# Patient Record
Sex: FEMALE | Race: WHITE | Employment: FULL TIME | URBAN - METROPOLITAN AREA
[De-identification: names, ages, dates, MRNs, and addresses within clinical notes are randomized per-mention and may not be internally consistent; named-entity substitution may affect disease eponyms.]

---

## 2017-02-27 ENCOUNTER — TELEPHONE (OUTPATIENT)
Dept: ALLERGY | Facility: CLINIC | Age: 42
End: 2017-02-27

## 2017-02-27 NOTE — TELEPHONE ENCOUNTER
Call reviewed and noted. Agree with triage advice provided.   Patient to follow-up with urgent care or PCP if needed to be seen today  Otherwise and available later this week to see her

## 2017-02-27 NOTE — TELEPHONE ENCOUNTER
Dr. Armando Burk, pt stts that s/s began 1 1/2 week ago. S/s pressure in chest/ with wheeze, sinus/head congestion, low grade fever 100 as of this AM, headache above/below eyes, slight blood when blowing nose, facial pressure/pain.  Pt denies chest tightness, SOB,

## 2017-02-27 NOTE — TELEPHONE ENCOUNTER
Patient reports that she has been experiencing symptoms of sinus infection for the past week and a half. Yesterday, she started experiencing a low grade fever (100.3). When she blows nose, notices that it is slightly bloody.  Wants to know if she can get ab

## 2017-05-28 ENCOUNTER — HOSPITAL ENCOUNTER (OUTPATIENT)
Age: 42
Discharge: HOME | End: 2017-05-28
Payer: COMMERCIAL

## 2017-05-28 VITALS
BODY MASS INDEX: 26.66 KG/M2 | OXYGEN SATURATION: 99 % | TEMPERATURE: 98 F | HEIGHT: 69 IN | SYSTOLIC BLOOD PRESSURE: 107 MMHG | DIASTOLIC BLOOD PRESSURE: 72 MMHG | RESPIRATION RATE: 12 BRPM | WEIGHT: 180 LBS | HEART RATE: 64 BPM

## 2017-05-28 DIAGNOSIS — J01.01 ACUTE RECURRENT MAXILLARY SINUSITIS: Primary | ICD-10-CM

## 2017-05-28 PROCEDURE — 87430 STREP A AG IA: CPT

## 2017-05-28 PROCEDURE — 99213 OFFICE O/P EST LOW 20 MIN: CPT

## 2017-05-28 PROCEDURE — 99204 OFFICE O/P NEW MOD 45 MIN: CPT

## 2017-05-28 RX ORDER — AMOXICILLIN AND CLAVULANATE POTASSIUM 875; 125 MG/1; MG/1
1 TABLET, FILM COATED ORAL 2 TIMES DAILY
Qty: 14 TABLET | Refills: 0 | Status: SHIPPED | OUTPATIENT
Start: 2017-05-28 | End: 2017-06-04

## 2017-05-28 NOTE — ED PROVIDER NOTES
Patient Seen in: 5 [facility]    History   Patient presents with:  Cough/URI    Stated Complaint: sore throat    HPI    Patient is a 53-year-old female with a history of chronic sinusitis and previous sinus surgery who pres Cancer Paternal Grandmother          Smoking Status: Former Smoker                   Packs/Day: 0.00  Years:           Types: Cigarettes      Start date: 05/17/1996      Quit date: 05/17/2006    Comment: No Household Smokers     Alcohol Use: Yes equal, round, and reactive to light. Neck: Normal range of motion. Neck supple. Cardiovascular: Normal rate, regular rhythm, normal heart sounds and intact distal pulses.     Pulmonary/Chest: Effort normal and breath sounds normal.   Neurological: She i

## 2017-05-28 NOTE — ED INITIAL ASSESSMENT (HPI)
REPORTS COUGH X PAST WEEK. NOW WITH SINUS PRESSURE, NASAL CONGESTION AND BILATERAL \"EAR PRESSURE. \"  DENIES [address]. PATIENT ALSO C/O SORE THROAT.

## 2017-06-30 ENCOUNTER — HOSPITAL ENCOUNTER (OUTPATIENT)
Dept: GENERAL RADIOLOGY | Facility: HOSPITAL | Age: 42
Discharge: HOME | End: 2017-06-30
Payer: COMMERCIAL

## 2017-06-30 DIAGNOSIS — R93.3 ABNORMAL COLONOSCOPY: ICD-10-CM

## 2017-06-30 PROCEDURE — 74280 X-RAY XM COLON 2CNTRST STD: CPT

## 2017-07-10 ENCOUNTER — OFFICE VISIT (OUTPATIENT)
Dept: ALLERGY | Facility: CLINIC | Age: 42
End: 2017-07-10

## 2017-07-10 VITALS
RESPIRATION RATE: 14 BRPM | TEMPERATURE: 99 F | SYSTOLIC BLOOD PRESSURE: 108 MMHG | WEIGHT: 190 LBS | DIASTOLIC BLOOD PRESSURE: 70 MMHG | BODY MASS INDEX: 28.14 KG/M2 | HEIGHT: 69 IN | HEART RATE: 54 BPM

## 2017-07-10 DIAGNOSIS — J32.8 OTHER CHRONIC SINUSITIS: Primary | ICD-10-CM

## 2017-07-10 DIAGNOSIS — J30.89 ENVIRONMENTAL AND SEASONAL ALLERGIES: ICD-10-CM

## 2017-07-10 PROCEDURE — 99214 OFFICE O/P EST MOD 30 MIN: CPT | Performed by: ALLERGY & IMMUNOLOGY

## 2017-07-10 PROCEDURE — 99212 OFFICE O/P EST SF 10 MIN: CPT | Performed by: ALLERGY & IMMUNOLOGY

## 2017-07-10 RX ORDER — MONTELUKAST SODIUM 10 MG/1
10 TABLET ORAL NIGHTLY
Qty: 30 TABLET | Refills: 5 | Status: SHIPPED | OUTPATIENT
Start: 2017-07-10 | End: 2018-04-09

## 2017-07-10 RX ORDER — LEVOCETIRIZINE DIHYDROCHLORIDE 5 MG/1
TABLET, FILM COATED ORAL
Qty: 30 TABLET | Refills: 5 | Status: SHIPPED | OUTPATIENT
Start: 2017-07-10 | End: 2019-07-03

## 2017-07-10 NOTE — PROGRESS NOTES
Zoe Rojas is a 46year old female. HPI:   No chief complaint on file. Patient is a 22-year-old female who presents for follow-up with a chief complaint of allergies and sinus issues.     Patient last seen by me on December 28, 2016    Patient ha Years: 0.00         Types: Cigarettes     Start date: 5/17/1996     Quit date: 5/17/2006  Comment: No Household Smokers   Alcohol use: Yes           0.6 oz/week     Glasses of wine: 1 per week     Comment: Social        Medications (Active prior to today' murmurs, gallups, or rubs  Abdomen: soft non-tender non-distended  Skin/Hair: no unusual rashes present   Extremities: no edema, cyanosis, or clubbing     ASSESSMENT/PLAN:   Assessment   Other chronic sinusitis  (primary encounter diagnosis)  Environmental

## 2017-07-20 PROBLEM — R10.9 ABDOMINAL PAIN, UNSPECIFIED LOCATION: Status: ACTIVE | Noted: 2017-07-20

## 2017-07-20 PROBLEM — K56.699 COLON STRICTURE (HCC): Status: ACTIVE | Noted: 2017-07-20

## 2017-07-20 PROBLEM — J30.89 SEASONAL ALLERGIC RHINITIS DUE TO OTHER ALLERGIC TRIGGER, UNSPECIFIED CHRONICITY: Status: ACTIVE | Noted: 2017-07-20

## 2017-07-20 PROBLEM — Z85.72 HISTORY OF NON-HODGKIN'S LYMPHOMA: Status: ACTIVE | Noted: 2017-07-20

## 2017-07-20 PROBLEM — J32.9 CHRONIC SINUSITIS, UNSPECIFIED LOCATION: Status: ACTIVE | Noted: 2017-07-20

## 2017-07-20 PROCEDURE — 84439 ASSAY OF FREE THYROXINE: CPT | Performed by: INTERNAL MEDICINE

## 2017-07-20 PROCEDURE — 36415 COLL VENOUS BLD VENIPUNCTURE: CPT | Performed by: INTERNAL MEDICINE

## 2017-07-20 PROCEDURE — 80061 LIPID PANEL: CPT | Performed by: INTERNAL MEDICINE

## 2017-07-20 PROCEDURE — 80050 GENERAL HEALTH PANEL: CPT | Performed by: INTERNAL MEDICINE

## 2017-12-06 ENCOUNTER — LAB ENCOUNTER (OUTPATIENT)
Dept: LAB | Age: 42
End: 2017-12-06
Attending: OBSTETRICS & GYNECOLOGY
Payer: COMMERCIAL

## 2017-12-06 DIAGNOSIS — Z01.818 PREOP TESTING: ICD-10-CM

## 2017-12-06 PROCEDURE — 36415 COLL VENOUS BLD VENIPUNCTURE: CPT

## 2017-12-06 PROCEDURE — 86900 BLOOD TYPING SEROLOGIC ABO: CPT

## 2017-12-06 PROCEDURE — 86850 RBC ANTIBODY SCREEN: CPT

## 2017-12-06 PROCEDURE — 86901 BLOOD TYPING SEROLOGIC RH(D): CPT

## 2017-12-07 ENCOUNTER — ANESTHESIA (OUTPATIENT)
Dept: SURGERY | Facility: HOSPITAL | Age: 42
End: 2017-12-07
Payer: COMMERCIAL

## 2017-12-07 ENCOUNTER — SURGERY (OUTPATIENT)
Age: 42
End: 2017-12-07

## 2017-12-07 ENCOUNTER — HOSPITAL ENCOUNTER (OUTPATIENT)
Facility: HOSPITAL | Age: 42
Setting detail: HOSPITAL OUTPATIENT SURGERY
Discharge: HOME | End: 2017-12-07
Attending: OBSTETRICS & GYNECOLOGY | Admitting: OBSTETRICS & GYNECOLOGY
Payer: COMMERCIAL

## 2017-12-07 ENCOUNTER — ANESTHESIA EVENT (OUTPATIENT)
Dept: SURGERY | Facility: HOSPITAL | Age: 42
End: 2017-12-07
Payer: COMMERCIAL

## 2017-12-07 VITALS
SYSTOLIC BLOOD PRESSURE: 102 MMHG | BODY MASS INDEX: 27.55 KG/M2 | WEIGHT: 186 LBS | TEMPERATURE: 98 F | RESPIRATION RATE: 18 BRPM | DIASTOLIC BLOOD PRESSURE: 73 MMHG | HEIGHT: 69 IN | OXYGEN SATURATION: 100 % | HEART RATE: 66 BPM

## 2017-12-07 DIAGNOSIS — Z01.818 PREOP TESTING: Primary | ICD-10-CM

## 2017-12-07 PROBLEM — O02.1 MISSED ABORTION: Status: ACTIVE | Noted: 2017-12-07

## 2017-12-07 PROBLEM — O02.1 MISSED ABORTION: Status: RESOLVED | Noted: 2017-12-07 | Resolved: 2017-12-07

## 2017-12-07 PROBLEM — O02.1 MISSED ABORTION (HCC): Status: RESOLVED | Noted: 2017-12-07 | Resolved: 2017-12-07

## 2017-12-07 PROBLEM — O02.1 MISSED ABORTION (HCC): Status: ACTIVE | Noted: 2017-12-07

## 2017-12-07 PROCEDURE — 88305 TISSUE EXAM BY PATHOLOGIST: CPT | Performed by: OBSTETRICS & GYNECOLOGY

## 2017-12-07 PROCEDURE — 10D17ZZ EXTRACTION OF PRODUCTS OF CONCEPTION, RETAINED, VIA NATURAL OR ARTIFICIAL OPENING: ICD-10-PCS | Performed by: OBSTETRICS & GYNECOLOGY

## 2017-12-07 RX ORDER — FAMOTIDINE 20 MG/1
20 TABLET ORAL ONCE
Status: DISCONTINUED | OUTPATIENT
Start: 2017-12-07 | End: 2017-12-07 | Stop reason: HOSPADM

## 2017-12-07 RX ORDER — ONDANSETRON 4 MG/1
4 TABLET, FILM COATED ORAL EVERY 8 HOURS PRN
Status: DISCONTINUED | OUTPATIENT
Start: 2017-12-07 | End: 2017-12-07

## 2017-12-07 RX ORDER — ACETAMINOPHEN 325 MG/1
650 TABLET ORAL EVERY 4 HOURS PRN
Status: DISCONTINUED | OUTPATIENT
Start: 2017-12-07 | End: 2017-12-07

## 2017-12-07 RX ORDER — ONDANSETRON 2 MG/ML
4 INJECTION INTRAMUSCULAR; INTRAVENOUS EVERY 8 HOURS PRN
Status: DISCONTINUED | OUTPATIENT
Start: 2017-12-07 | End: 2017-12-07

## 2017-12-07 RX ORDER — HYDROMORPHONE HYDROCHLORIDE 1 MG/ML
0.4 INJECTION, SOLUTION INTRAMUSCULAR; INTRAVENOUS; SUBCUTANEOUS EVERY 5 MIN PRN
Status: DISCONTINUED | OUTPATIENT
Start: 2017-12-07 | End: 2017-12-07

## 2017-12-07 RX ORDER — SODIUM CHLORIDE, SODIUM LACTATE, POTASSIUM CHLORIDE, CALCIUM CHLORIDE 600; 310; 30; 20 MG/100ML; MG/100ML; MG/100ML; MG/100ML
INJECTION, SOLUTION INTRAVENOUS CONTINUOUS
Status: DISCONTINUED | OUTPATIENT
Start: 2017-12-07 | End: 2017-12-07

## 2017-12-07 RX ORDER — ONDANSETRON 2 MG/ML
4 INJECTION INTRAMUSCULAR; INTRAVENOUS ONCE AS NEEDED
Status: DISCONTINUED | OUTPATIENT
Start: 2017-12-07 | End: 2017-12-07

## 2017-12-07 RX ORDER — HALOPERIDOL 5 MG/ML
0.25 INJECTION INTRAMUSCULAR ONCE AS NEEDED
Status: DISCONTINUED | OUTPATIENT
Start: 2017-12-07 | End: 2017-12-07

## 2017-12-07 RX ORDER — MORPHINE SULFATE 2 MG/ML
2 INJECTION, SOLUTION INTRAMUSCULAR; INTRAVENOUS EVERY 10 MIN PRN
Status: DISCONTINUED | OUTPATIENT
Start: 2017-12-07 | End: 2017-12-07

## 2017-12-07 RX ORDER — KETOROLAC TROMETHAMINE 30 MG/ML
INJECTION, SOLUTION INTRAMUSCULAR; INTRAVENOUS AS NEEDED
Status: DISCONTINUED | OUTPATIENT
Start: 2017-12-07 | End: 2017-12-07 | Stop reason: SURG

## 2017-12-07 RX ORDER — MIDAZOLAM HYDROCHLORIDE 1 MG/ML
INJECTION INTRAMUSCULAR; INTRAVENOUS AS NEEDED
Status: DISCONTINUED | OUTPATIENT
Start: 2017-12-07 | End: 2017-12-07 | Stop reason: SURG

## 2017-12-07 RX ORDER — NALOXONE HYDROCHLORIDE 0.4 MG/ML
80 INJECTION, SOLUTION INTRAMUSCULAR; INTRAVENOUS; SUBCUTANEOUS AS NEEDED
Status: DISCONTINUED | OUTPATIENT
Start: 2017-12-07 | End: 2017-12-07

## 2017-12-07 RX ORDER — LIDOCAINE HYDROCHLORIDE 10 MG/ML
INJECTION, SOLUTION EPIDURAL; INFILTRATION; INTRACAUDAL; PERINEURAL AS NEEDED
Status: DISCONTINUED | OUTPATIENT
Start: 2017-12-07 | End: 2017-12-07 | Stop reason: SURG

## 2017-12-07 RX ORDER — DEXAMETHASONE SODIUM PHOSPHATE 4 MG/ML
VIAL (ML) INJECTION AS NEEDED
Status: DISCONTINUED | OUTPATIENT
Start: 2017-12-07 | End: 2017-12-07 | Stop reason: SURG

## 2017-12-07 RX ORDER — HYDROMORPHONE HYDROCHLORIDE 1 MG/ML
0.2 INJECTION, SOLUTION INTRAMUSCULAR; INTRAVENOUS; SUBCUTANEOUS EVERY 5 MIN PRN
Status: DISCONTINUED | OUTPATIENT
Start: 2017-12-07 | End: 2017-12-07

## 2017-12-07 RX ORDER — HYDROCODONE BITARTRATE AND ACETAMINOPHEN 5; 325 MG/1; MG/1
1 TABLET ORAL AS NEEDED
Status: DISCONTINUED | OUTPATIENT
Start: 2017-12-07 | End: 2017-12-07

## 2017-12-07 RX ORDER — MORPHINE SULFATE 4 MG/ML
4 INJECTION, SOLUTION INTRAMUSCULAR; INTRAVENOUS EVERY 10 MIN PRN
Status: DISCONTINUED | OUTPATIENT
Start: 2017-12-07 | End: 2017-12-07

## 2017-12-07 RX ORDER — HYDROMORPHONE HYDROCHLORIDE 1 MG/ML
0.6 INJECTION, SOLUTION INTRAMUSCULAR; INTRAVENOUS; SUBCUTANEOUS EVERY 5 MIN PRN
Status: DISCONTINUED | OUTPATIENT
Start: 2017-12-07 | End: 2017-12-07

## 2017-12-07 RX ORDER — HYDROCODONE BITARTRATE AND ACETAMINOPHEN 5; 325 MG/1; MG/1
2 TABLET ORAL EVERY 4 HOURS PRN
Status: DISCONTINUED | OUTPATIENT
Start: 2017-12-07 | End: 2017-12-07

## 2017-12-07 RX ORDER — DEXTROSE, SODIUM CHLORIDE, SODIUM LACTATE, POTASSIUM CHLORIDE, AND CALCIUM CHLORIDE 5; .6; .31; .03; .02 G/100ML; G/100ML; G/100ML; G/100ML; G/100ML
INJECTION, SOLUTION INTRAVENOUS CONTINUOUS
Status: DISCONTINUED | OUTPATIENT
Start: 2017-12-07 | End: 2017-12-07

## 2017-12-07 RX ORDER — HYDROCODONE BITARTRATE AND ACETAMINOPHEN 5; 325 MG/1; MG/1
2 TABLET ORAL AS NEEDED
Status: DISCONTINUED | OUTPATIENT
Start: 2017-12-07 | End: 2017-12-07

## 2017-12-07 RX ORDER — MORPHINE SULFATE 10 MG/ML
6 INJECTION, SOLUTION INTRAMUSCULAR; INTRAVENOUS EVERY 10 MIN PRN
Status: DISCONTINUED | OUTPATIENT
Start: 2017-12-07 | End: 2017-12-07

## 2017-12-07 RX ORDER — HYDROCODONE BITARTRATE AND ACETAMINOPHEN 5; 325 MG/1; MG/1
1 TABLET ORAL EVERY 4 HOURS PRN
Status: DISCONTINUED | OUTPATIENT
Start: 2017-12-07 | End: 2017-12-07

## 2017-12-07 RX ORDER — ACETAMINOPHEN 500 MG
1000 TABLET ORAL ONCE
Status: COMPLETED | OUTPATIENT
Start: 2017-12-07 | End: 2017-12-07

## 2017-12-07 RX ORDER — ONDANSETRON 2 MG/ML
INJECTION INTRAMUSCULAR; INTRAVENOUS AS NEEDED
Status: DISCONTINUED | OUTPATIENT
Start: 2017-12-07 | End: 2017-12-07 | Stop reason: SURG

## 2017-12-07 RX ADMIN — DEXAMETHASONE SODIUM PHOSPHATE 4 MG: 4 MG/ML VIAL (ML) INJECTION at 11:40:00

## 2017-12-07 RX ADMIN — ONDANSETRON 4 MG: 2 INJECTION INTRAMUSCULAR; INTRAVENOUS at 11:40:00

## 2017-12-07 RX ADMIN — SODIUM CHLORIDE, SODIUM LACTATE, POTASSIUM CHLORIDE, CALCIUM CHLORIDE: 600; 310; 30; 20 INJECTION, SOLUTION INTRAVENOUS at 12:15:00

## 2017-12-07 RX ADMIN — KETOROLAC TROMETHAMINE 60 MG: 30 INJECTION, SOLUTION INTRAMUSCULAR; INTRAVENOUS at 12:05:00

## 2017-12-07 RX ADMIN — LIDOCAINE HYDROCHLORIDE 25 MG: 10 INJECTION, SOLUTION EPIDURAL; INFILTRATION; INTRACAUDAL; PERINEURAL at 11:40:00

## 2017-12-07 RX ADMIN — SODIUM CHLORIDE, SODIUM LACTATE, POTASSIUM CHLORIDE, CALCIUM CHLORIDE: 600; 310; 30; 20 INJECTION, SOLUTION INTRAVENOUS at 11:39:00

## 2017-12-07 RX ADMIN — MIDAZOLAM HYDROCHLORIDE 2 MG: 1 INJECTION INTRAMUSCULAR; INTRAVENOUS at 11:40:00

## 2017-12-07 NOTE — ANESTHESIA POSTPROCEDURE EVALUATION
Patient: Ayr Tran    Procedure Summary     Date:  17 Room / Location:  [facility] MAIN OR  [facility] MAIN OR    Anesthesia Start:   Anesthesia Stop:      Procedure:  Andreas 79 (N/A ) Diagnosis:  (missed )    Surgeon:  Carlos La

## 2017-12-07 NOTE — OPERATIVE REPORT
[facility]    PATIENT'S NAME: Jeremy Lazcano   ATTENDING PHYSICIAN: Ronalee Severin. MD Thalia   OPERATING PHYSICIAN: Ronalee Severin.  MD Thalia   PATIENT ACCOUNT#:   [id number]    LOCATION:  [facility] 5 [facility] 10  MEDICAL RECORD #:   [id number]

## 2017-12-07 NOTE — H&P
[facility]    PATIENT'S NAME: Ame Garcia   ATTENDING PHYSICIAN: Lon Garcia.  MD Thalia   PATIENT ACCOUNT#:   [id number]    LOCATION:  [facility]  MEDICAL RECORD #:   [id number]       YOB: 1975  ADMISSION DATE:       12/07 react to light. Nares and throat clear. NECK:  Supple without thyromegaly or adenopathy. LUNGS:  Clear. HEART:  Regular rate and rhythm without murmurs. BREASTS:  No masses. ABDOMEN:  No hepatosplenomegaly, masses, or tenderness.     PELVIC:  External

## 2017-12-07 NOTE — INTERVAL H&P NOTE
Pre-op Diagnosis: missed     The above referenced H&P was reviewed by Jaqueline Magallanes MD on 2017, the patient was examined and no significant changes have occurred in the patient's condition since the H&P was performed.   I discussed wi

## 2017-12-07 NOTE — BRIEF OP NOTE
Pre-Operative Diagnosis: missed      Post-Operative Diagnosis: missed      Procedure Performed:   Procedure(s):  suction dilation and curettage    Surgeon(s) and Role:     * Juli Corrigan MD - Primary    Assistant(s):        Tobin Lance

## 2017-12-07 NOTE — ANESTHESIA PREPROCEDURE EVALUATION
Anesthesia PreOp Note    HPI:     Jeremy Nesbitt is a 38year old female who presents for preoperative consultation requested by: Brian Ly MD    Date of Surgery: 12/7/2017    Procedure(s):  DILATION & CURETTAGE SUCTION  Indication: missed a Unknown time   alprazolam Shobha Baldwin) 2 MG Oral Tab Take 2 mg by mouth 3 (three) times daily as needed.    Disp:  Rfl: 0 12/7/2017 at 1030       Current Facility-Administered Medications Ordered in Epic:  lactated ringers infusion  Intravenous Continuous Fitzma I  TM distance: >3 FB  Neck ROM: full  Dental - normal exam     Pulmonary - negative ROS and normal exam   Cardiovascular - negative ROS and normal exam    Neuro/Psych - negative ROS     GI/Hepatic/Renal - negative ROS     Endo/Other - negative ROS   Abdom

## 2018-03-24 RX ORDER — BECLOMETHASONE DIPROPIONATE 80 UG/1
AEROSOL, METERED NASAL
Qty: 8.7 G | Refills: 0 | Status: SHIPPED | OUTPATIENT
Start: 2018-03-24 | End: 2018-04-09

## 2018-03-24 NOTE — TELEPHONE ENCOUNTER
Refill requested for QNASL 80MCG NASAL AEROSOL 120SPRAYS  Will file in chart as: QNASL 80 MCG/ACT Nasal Aero Soln  USE 2 SPRAYS IN EACH NOSTRIL DAILY       Disp: 8.7 g Refills: 0    Class: Normal Start: 3/24/2018   Originally ordered: 1 year ago by Bart Cabrera

## 2018-03-24 NOTE — TELEPHONE ENCOUNTER
Qnasl refill ×1. Please call patient to schedule follow-up appointment as she is overdue. No further refills until seen in office.

## 2018-04-09 ENCOUNTER — OFFICE VISIT (OUTPATIENT)
Dept: ALLERGY | Facility: CLINIC | Age: 43
End: 2018-04-09

## 2018-04-09 VITALS
OXYGEN SATURATION: 98 % | HEART RATE: 58 BPM | DIASTOLIC BLOOD PRESSURE: 70 MMHG | RESPIRATION RATE: 16 BRPM | SYSTOLIC BLOOD PRESSURE: 110 MMHG | BODY MASS INDEX: 28.04 KG/M2 | TEMPERATURE: 98 F | WEIGHT: 185 LBS | HEIGHT: 68 IN

## 2018-04-09 DIAGNOSIS — J30.89 ENVIRONMENTAL AND SEASONAL ALLERGIES: ICD-10-CM

## 2018-04-09 DIAGNOSIS — J32.8 OTHER CHRONIC SINUSITIS: Primary | ICD-10-CM

## 2018-04-09 PROCEDURE — 99214 OFFICE O/P EST MOD 30 MIN: CPT | Performed by: ALLERGY & IMMUNOLOGY

## 2018-04-09 PROCEDURE — 99212 OFFICE O/P EST SF 10 MIN: CPT | Performed by: ALLERGY & IMMUNOLOGY

## 2018-04-09 NOTE — PATIENT INSTRUCTIONS
Recs continue with Qnasl and Xyzal:   Trial of Ponaris is a nasal emollient  Trial of nasal saline  Humidified air    Follow-up in 6-12 months or sooner if needed

## 2018-04-09 NOTE — PROGRESS NOTES
Arvin Lopes is a 38year old female. HPI:   Patient presents with: Allergies: Routine f/u for AR. She is currently taking her Xyzal and Qnasl. She d/c'd Singulair mid-November when she found out about pregnancy, has not resumed.    She had a D&C i node  No date: REPAIR OF NASAL SEPTUM   Family History   Problem Relation Age of Onset   • Hypertension Brother    • Diabetes Maternal Grandmother    • Heart Disorder Maternal Grandfather    • Cancer Paternal Grandmother       Social History: Smoking statu are moist .  Nasal irritation with dried blood on her inferior turbinate on the left-hand side  Neck/Thyroid: neck is supple without adenopathy  Lymphatic: no abnormal cervical, supraclavicular or axillary adenopathy is noted  Respiratory: normal to inspec

## 2018-04-15 ENCOUNTER — HOSPITAL ENCOUNTER (OUTPATIENT)
Age: 43
Discharge: HOME | End: 2018-04-15
Payer: COMMERCIAL

## 2018-04-15 VITALS
DIASTOLIC BLOOD PRESSURE: 76 MMHG | WEIGHT: 185 LBS | OXYGEN SATURATION: 100 % | HEIGHT: 68 IN | HEART RATE: 62 BPM | RESPIRATION RATE: 14 BRPM | TEMPERATURE: 99 F | SYSTOLIC BLOOD PRESSURE: 109 MMHG | BODY MASS INDEX: 28.04 KG/M2

## 2018-04-15 DIAGNOSIS — N30.01 ACUTE CYSTITIS WITH HEMATURIA: Primary | ICD-10-CM

## 2018-04-15 PROCEDURE — 87088 URINE BACTERIA CULTURE: CPT | Performed by: PHYSICIAN ASSISTANT

## 2018-04-15 PROCEDURE — 81025 URINE PREGNANCY TEST: CPT

## 2018-04-15 PROCEDURE — 99214 OFFICE O/P EST MOD 30 MIN: CPT

## 2018-04-15 PROCEDURE — 87086 URINE CULTURE/COLONY COUNT: CPT | Performed by: PHYSICIAN ASSISTANT

## 2018-04-15 PROCEDURE — 87186 SC STD MICRODIL/AGAR DIL: CPT | Performed by: PHYSICIAN ASSISTANT

## 2018-04-15 RX ORDER — CIPROFLOXACIN 500 MG/1
500 TABLET, FILM COATED ORAL 2 TIMES DAILY
Qty: 14 TABLET | Refills: 0 | Status: SHIPPED | OUTPATIENT
Start: 2018-04-15 | End: 2018-04-22

## 2018-04-15 RX ORDER — PHENAZOPYRIDINE HYDROCHLORIDE 200 MG/1
200 TABLET, FILM COATED ORAL 3 TIMES DAILY PRN
Qty: 9 TABLET | Refills: 0 | Status: SHIPPED | OUTPATIENT
Start: 2018-04-15 | End: 2018-04-22

## 2018-04-15 NOTE — ED PROVIDER NOTES
[facility]    History   Patient presents with:  Urinary Symptoms (urologic)    Stated Complaint: UTI    HPI    Patient is a 44-year-old female who presents for evaluation of possible UTI.   Admits to incre Gastrointestinal:        Lower abd pressure   Genitourinary: Positive for frequency and urgency. Positive for stated complaint: UTI  Other systems are as noted in HPI. Constitutional and vital signs reviewed.       All other systems reviewed and ne Given good ER precautions for any worsening symptoms. All questions answered prior to discharge. BP improved prior to dc.          Disposition and Plan     Clinical Impression:  Acute cystitis with hematuria  (primary encounter diagnosis)    Disposition:

## 2018-04-15 NOTE — ED INITIAL ASSESSMENT (HPI)
PATIENT ARRIVED AMBULATORY TO ROOM C/O SYMPTOMS OF A UTI THAT STARTED YESTERDAY. +LOWER BACK PAIN. +FREQUENCY. +URGENCY. NO PAIN WITH URINATION. PATIENT STATES SHE HAS BEEN TAKING AZO AT HOME. SLIGHT LOWER ABDOMINAL PAIN. NO FEVERS. +NAUSEA. NO V/D.

## 2018-04-24 ENCOUNTER — HOSPITAL ENCOUNTER (OUTPATIENT)
Dept: MAMMOGRAPHY | Facility: HOSPITAL | Age: 43
Discharge: HOME | End: 2018-04-24
Attending: OBSTETRICS & GYNECOLOGY
Payer: COMMERCIAL

## 2018-04-24 DIAGNOSIS — Z12.31 ENCOUNTER FOR SCREENING MAMMOGRAM FOR MALIGNANT NEOPLASM OF BREAST: ICD-10-CM

## 2018-04-24 PROCEDURE — 77067 SCR MAMMO BI INCL CAD: CPT | Performed by: OBSTETRICS & GYNECOLOGY

## 2018-04-25 ENCOUNTER — HOSPITAL ENCOUNTER (OUTPATIENT)
Dept: MAMMOGRAPHY | Facility: HOSPITAL | Age: 43
Discharge: HOME | End: 2018-04-25
Attending: OBSTETRICS & GYNECOLOGY
Payer: COMMERCIAL

## 2018-04-25 ENCOUNTER — HOSPITAL ENCOUNTER (OUTPATIENT)
Dept: ULTRASOUND IMAGING | Facility: HOSPITAL | Age: 43
Discharge: HOME | End: 2018-04-25
Attending: OBSTETRICS & GYNECOLOGY
Payer: COMMERCIAL

## 2018-04-25 DIAGNOSIS — R92.8 ABNORMAL MAMMOGRAM: ICD-10-CM

## 2018-04-25 PROCEDURE — 76642 ULTRASOUND BREAST LIMITED: CPT | Performed by: OBSTETRICS & GYNECOLOGY

## 2018-04-25 PROCEDURE — 77061 BREAST TOMOSYNTHESIS UNI: CPT | Performed by: OBSTETRICS & GYNECOLOGY

## 2018-04-25 PROCEDURE — 77065 DX MAMMO INCL CAD UNI: CPT | Performed by: OBSTETRICS & GYNECOLOGY

## 2018-07-31 ENCOUNTER — OFFICE VISIT (OUTPATIENT)
Dept: OBGYN CLINIC | Facility: CLINIC | Age: 43
End: 2018-07-31
Payer: COMMERCIAL

## 2018-07-31 VITALS
BODY MASS INDEX: 29.1 KG/M2 | HEIGHT: 68 IN | SYSTOLIC BLOOD PRESSURE: 103 MMHG | HEART RATE: 66 BPM | DIASTOLIC BLOOD PRESSURE: 70 MMHG | WEIGHT: 192 LBS

## 2018-07-31 DIAGNOSIS — N80.9 ENDOMETRIOSIS: ICD-10-CM

## 2018-07-31 DIAGNOSIS — Z01.419 ENCOUNTER FOR GYNECOLOGICAL EXAMINATION WITHOUT ABNORMAL FINDING: Primary | ICD-10-CM

## 2018-07-31 DIAGNOSIS — Z85.72 HISTORY OF NON-HODGKIN'S LYMPHOMA: ICD-10-CM

## 2018-07-31 DIAGNOSIS — Z12.4 SCREENING FOR MALIGNANT NEOPLASM OF CERVIX: ICD-10-CM

## 2018-07-31 PROCEDURE — 99386 PREV VISIT NEW AGE 40-64: CPT | Performed by: OBSTETRICS & GYNECOLOGY

## 2018-07-31 RX ORDER — HYDROCODONE BITARTRATE AND ACETAMINOPHEN 5; 325 MG/1; MG/1
TABLET ORAL
COMMUNITY
End: 2019-07-25

## 2018-07-31 NOTE — PROGRESS NOTES
HPI:    Patient ID: Leah Bartlett is a 38year old female. \" Colemolinaa Reasoner" referred by Kiera Yepez. HPI  G 5 P 0142 with reg menses q 28d / 4d / normal on ParaGard placed by Dr Mendez Colon in February for [facility]. She is post D and C for missed AB in December.  This w Dihydrochloride 5 MG Oral Tab TAKE 1 TABLET(5 MG) BY MOUTH EVERY NIGHT Disp: 30 tablet Rfl: 5   alprazolam (XANAX) 2 MG Oral Tab Take 2 mg by mouth 3 (three) times daily as needed.    Disp:  Rfl: 0   HYDROcodone-acetaminophen (NORCO) 5-325 MG Oral Tab Norco the lesion on colon is still present, the best course would be to consider a GnRH agonist followed by ovarian suppression.  She would like to avoid bowel surgery and I would agree but there is a risk of bleeding, obstruction and even perforation if endo pro

## 2018-08-02 LAB — HPV I/H RISK 1 DNA SPEC QL NAA+PROBE: NEGATIVE

## 2018-08-04 LAB — LAST PAP RESULT: NORMAL

## 2018-08-09 PROBLEM — N80.9 ENDOMETRIOSIS: Status: ACTIVE | Noted: 2018-08-09

## 2018-11-05 ENCOUNTER — HOSPITAL ENCOUNTER (OUTPATIENT)
Age: 43
Discharge: HOME | End: 2018-11-05
Payer: COMMERCIAL

## 2018-11-05 VITALS
OXYGEN SATURATION: 95 % | DIASTOLIC BLOOD PRESSURE: 70 MMHG | BODY MASS INDEX: 28.14 KG/M2 | HEIGHT: 69 IN | HEART RATE: 72 BPM | TEMPERATURE: 98 F | WEIGHT: 190 LBS | RESPIRATION RATE: 18 BRPM | SYSTOLIC BLOOD PRESSURE: 111 MMHG

## 2018-11-05 DIAGNOSIS — S16.1XXA STRAIN OF NECK MUSCLE, INITIAL ENCOUNTER: ICD-10-CM

## 2018-11-05 DIAGNOSIS — J01.10 ACUTE NON-RECURRENT FRONTAL SINUSITIS: Primary | ICD-10-CM

## 2018-11-05 PROCEDURE — 99214 OFFICE O/P EST MOD 30 MIN: CPT

## 2018-11-05 PROCEDURE — 99213 OFFICE O/P EST LOW 20 MIN: CPT

## 2018-11-05 PROCEDURE — 87430 STREP A AG IA: CPT

## 2018-11-05 RX ORDER — METHYLPREDNISOLONE 4 MG/1
TABLET ORAL
Qty: 1 PACKAGE | Refills: 0 | Status: SHIPPED | OUTPATIENT
Start: 2018-11-05 | End: 2018-11-10

## 2018-11-05 RX ORDER — AMOXICILLIN AND CLAVULANATE POTASSIUM 875; 125 MG/1; MG/1
1 TABLET, FILM COATED ORAL 2 TIMES DAILY
Qty: 20 TABLET | Refills: 0 | Status: SHIPPED | OUTPATIENT
Start: 2018-11-05 | End: 2018-11-15

## 2018-11-05 NOTE — ED PROVIDER NOTES
Patient presents with:  Sore Throat      HPI:     Glory Mcdaniels is a 38year old female who presents for evaluation and management of a chief complaint of dull maxillary sinus congestion, thick purulent drainage from the nose, postnasal drip, sore throat date: 2006        Years since quittin.4      Smokeless tobacco: Former User      Tobacco comment: No household smokers. Substance and Sexual Activity      Alcohol use:  Yes        Alcohol/week: 2.4 oz        Types: 4 Glasses of wine per week Rapid Strep      methylPREDNISolone 4 MG Oral Tablet Therapy Pack          Sig: Dosepack: use as directed on packaging          Dispense:  1 Package          Refill:  0      Amoxicillin-Pot Clavulanate 875-125 MG Oral Tab          Sig: Take 1 tablet by reed

## 2018-11-05 NOTE — ED INITIAL ASSESSMENT (HPI)
PATIENT ARRIVED AMBULATORY TO ROOM C/O A SORE THROAT THAT STARTED 5 DAYS AGO. +NASAL CONGESTION. +NON PRODUCTIVE COUGH. +CHILLS. PT DENIES TAKING TEMPERATURES AT HOME. NO N/V/D. EASY NON LABORED RESPIRATIONS.  NO DISTRESS

## 2018-11-20 ENCOUNTER — TELEPHONE (OUTPATIENT)
Dept: OBGYN CLINIC | Facility: CLINIC | Age: 43
End: 2018-11-20

## 2018-11-20 NOTE — TELEPHONE ENCOUNTER
C/O HAD PARAGARD IUD PLACED WITH DIFFERENT DR IN JAN 2018. REGULAR PERIODS UNTIL THIS MONTH.  GOT CRAMPING LIKE A PERIOD WOULD START BUT NO BLEED. PT STARTED KETO DIET 3 WEEKS AGO AND READ THAT IT COULD AFFECT PERIODS.   PT DOES NOT CHECK STRINGS BUT STAT

## 2018-11-21 NOTE — TELEPHONE ENCOUNTER
I would recommend just observing for now. I want her to call if irregularities encompass 3 of 6 subsequent cycles.

## 2019-01-16 ENCOUNTER — TELEPHONE (OUTPATIENT)
Dept: ALLERGY | Facility: CLINIC | Age: 44
End: 2019-01-16

## 2019-01-16 NOTE — TELEPHONE ENCOUNTER
Spoke with Dr. Dunia Yao, He recommends the following:   Sinus rinses  Sudafed 120 mg BID PRN  Afrin nasal spray, but only for a couple days.

## 2019-01-16 NOTE — TELEPHONE ENCOUNTER
Per pt, she is now taking Qnasal but pt is traveling to [place] and would like to know if she can increase the dose or anything she can take to help her about altitude, Pls advise.

## 2019-01-16 NOTE — TELEPHONE ENCOUNTER
Discussed with nursing staff. May add sinus rinses, Sudafed 120 mg every 12 hours as needed or lastly if needed a brief course of Afrin nasal decongestant spray for a 2-3-day.

## 2019-01-16 NOTE — TELEPHONE ENCOUNTER
Pt reports she is taking Xyzal nightly, and QNasl. She is agreeable to Dr. Susy Leroy recommendations below. No further action needed at this time.

## 2019-02-22 ENCOUNTER — HOSPITAL ENCOUNTER (OUTPATIENT)
Age: 44
Discharge: HOME | End: 2019-02-22
Payer: COMMERCIAL

## 2019-02-22 VITALS
SYSTOLIC BLOOD PRESSURE: 118 MMHG | WEIGHT: 185 LBS | OXYGEN SATURATION: 97 % | HEART RATE: 73 BPM | BODY MASS INDEX: 27.4 KG/M2 | RESPIRATION RATE: 18 BRPM | TEMPERATURE: 98 F | HEIGHT: 69 IN | DIASTOLIC BLOOD PRESSURE: 73 MMHG

## 2019-02-22 DIAGNOSIS — J02.0 STREPTOCOCCAL SORE THROAT: Primary | ICD-10-CM

## 2019-02-22 LAB — S PYO AG THROAT QL: POSITIVE

## 2019-02-22 PROCEDURE — 99213 OFFICE O/P EST LOW 20 MIN: CPT

## 2019-02-22 PROCEDURE — 99214 OFFICE O/P EST MOD 30 MIN: CPT

## 2019-02-22 PROCEDURE — 87430 STREP A AG IA: CPT

## 2019-02-22 RX ORDER — AMOXICILLIN 875 MG/1
875 TABLET, COATED ORAL 2 TIMES DAILY
Qty: 20 TABLET | Refills: 0 | Status: SHIPPED | OUTPATIENT
Start: 2019-02-22 | End: 2019-03-04

## 2019-02-22 NOTE — ED INITIAL ASSESSMENT (HPI)
PATIENT ARRIVED AMBULATORY TO ROOM. PATIENT'S SON IS A PATIENT IN THE IC AND POSITIVE FOR STREP SO PATIENT WANTED TO SIGN IN. +SLIGHT SORE THROAT X1 WEEK. +NASAL CONGESTION.  NO FEVERS

## 2019-02-23 NOTE — ED PROVIDER NOTES
Patient presents with:  Sore Throat      HPI:     Leo Cleaning is a 41year old female who presents for evaluation of a chief complaint of sore throat with some congestion for the past week and a half. No fevers. No difficulty swallowing.   Speech is c Not on file      Stress: Not on file    Relationships      Social connections:        Talks on phone: Not on file        Gets together: Not on file        Attends [employer] service: Not on file        Active member of club or organization: Not on file 10 days. Dispense:  20 tablet          Refill:  0      Labs performed this visit:  No results found for this or any previous visit (from the past 10 hour(s)). MDM:  The rapid strep test is positive.   I will be with amoxicillin and recommend she

## 2019-03-20 ENCOUNTER — MED REC SCAN ONLY (OUTPATIENT)
Dept: INTERNAL MEDICINE CLINIC | Facility: CLINIC | Age: 44
End: 2019-03-20

## 2019-05-14 RX ORDER — BECLOMETHASONE DIPROPIONATE 80 UG/1
AEROSOL, METERED NASAL
Qty: 8.7 G | Refills: 0 | Status: SHIPPED | OUTPATIENT
Start: 2019-05-14 | End: 2019-06-24

## 2019-05-14 NOTE — TELEPHONE ENCOUNTER
Pt scheduled for June 17 th to see Dr. Doc Kawasaki because we didn't have anything after 5 pm free.  Patient reports she is completely out of nasal spray currently, and since that appointment is over thirty days from now, she will run out of nasal spray again enrique

## 2019-05-14 NOTE — TELEPHONE ENCOUNTER
Pt last seen in Allergy 4/9/2018 for . . . Other chronic sinusitis  (primary encounter diagnosis)  Environmental and seasonal allergies     Refill request received for .  . .    Beclomethasone Dipropionate (QNASL) 80 MCG/ACT Nasal Aero Soln 8.7 g 10 4/9/

## 2019-06-24 RX ORDER — BECLOMETHASONE DIPROPIONATE 80 UG/1
AEROSOL, METERED NASAL
Qty: 10.6 G | Refills: 0 | Status: SHIPPED | OUTPATIENT
Start: 2019-06-24 | End: 2019-07-03

## 2019-06-24 NOTE — TELEPHONE ENCOUNTER
Refill requested for:   Name from pharmacy: [address]         Will file in chart as: QNASL 80 MCG/ACT Nasal Aero Soln    Sig: USE 2 SPRAYS IN EACH NOSTRIL DAILY    Disp:  10.6 g    Refills:  0    Start: 6/24/2019    Class: Normal

## 2019-07-03 ENCOUNTER — OFFICE VISIT (OUTPATIENT)
Dept: ALLERGY | Facility: CLINIC | Age: 44
End: 2019-07-03
Payer: COMMERCIAL

## 2019-07-03 VITALS
TEMPERATURE: 98 F | DIASTOLIC BLOOD PRESSURE: 71 MMHG | OXYGEN SATURATION: 96 % | SYSTOLIC BLOOD PRESSURE: 107 MMHG | RESPIRATION RATE: 16 BRPM | HEART RATE: 62 BPM

## 2019-07-03 DIAGNOSIS — J30.89 ENVIRONMENTAL AND SEASONAL ALLERGIES: Primary | ICD-10-CM

## 2019-07-03 DIAGNOSIS — J32.8 OTHER CHRONIC SINUSITIS: ICD-10-CM

## 2019-07-03 PROCEDURE — 99214 OFFICE O/P EST MOD 30 MIN: CPT | Performed by: ALLERGY & IMMUNOLOGY

## 2019-07-03 RX ORDER — LEVOCETIRIZINE DIHYDROCHLORIDE 5 MG/1
5 TABLET, FILM COATED ORAL NIGHTLY
Qty: 90 TABLET | Refills: 2 | Status: SHIPPED | OUTPATIENT
Start: 2019-07-03 | End: 2020-11-02

## 2019-07-03 NOTE — PROGRESS NOTES
Chelsy Bowman is a 41year old female. HPI:   Patient presents with: Follow - Up: Patient reports sinusistis is doing well. Patient is a 46-year-old female who presents for follow-up with a chief complaint of allergies.     Patient last seen by me 25s, lived til age 82      Social History: Social History    Tobacco Use      Smoking status: Former Smoker        Types: Cigarettes        Start date: 1996        Quit date: 2006        Years since quittin.1      Smokeless tobacco: Former U intact  Nose/Mouth/Throat: nose and throat are clear mucous membranes are moist   Neck/Thyroid: neck is supple without adenopathy  Lymphatic: no abnormal cervical, supraclavicular or axillary adenopathy is noted  Respiratory: normal to inspection lungs are

## 2019-07-03 NOTE — PATIENT INSTRUCTIONS
Stable and controlled at this time with current regimen including Qnasl and Xyzal.  Denies recurrent sinus infections or antibiotics.    Continue with with current Qnasl and Xyzal.  Reviewed avoidance measures and potential treatment option of immunotherapy

## 2019-07-19 ENCOUNTER — HOSPITAL ENCOUNTER (OUTPATIENT)
Dept: MAMMOGRAPHY | Age: 44
Discharge: HOME | End: 2019-07-19
Payer: COMMERCIAL

## 2019-07-19 DIAGNOSIS — Z12.31 VISIT FOR SCREENING MAMMOGRAM: ICD-10-CM

## 2019-07-19 PROCEDURE — 77067 SCR MAMMO BI INCL CAD: CPT | Performed by: INTERNAL MEDICINE

## 2019-07-19 PROCEDURE — 77063 BREAST TOMOSYNTHESIS BI: CPT | Performed by: INTERNAL MEDICINE

## 2019-07-25 ENCOUNTER — OFFICE VISIT (OUTPATIENT)
Dept: INTERNAL MEDICINE CLINIC | Facility: CLINIC | Age: 44
End: 2019-07-25
Payer: COMMERCIAL

## 2019-07-25 ENCOUNTER — LAB ENCOUNTER (OUTPATIENT)
Dept: LAB | Age: 44
End: 2019-07-25
Attending: INTERNAL MEDICINE
Payer: COMMERCIAL

## 2019-07-25 VITALS
TEMPERATURE: 98 F | BODY MASS INDEX: 29.2 KG/M2 | DIASTOLIC BLOOD PRESSURE: 88 MMHG | HEIGHT: 68 IN | OXYGEN SATURATION: 98 % | WEIGHT: 192.63 LBS | HEART RATE: 66 BPM | SYSTOLIC BLOOD PRESSURE: 112 MMHG

## 2019-07-25 DIAGNOSIS — F32.A DEPRESSION, UNSPECIFIED DEPRESSION TYPE: ICD-10-CM

## 2019-07-25 DIAGNOSIS — J32.9 CHRONIC SINUSITIS, UNSPECIFIED LOCATION: ICD-10-CM

## 2019-07-25 DIAGNOSIS — K21.9 GASTROESOPHAGEAL REFLUX DISEASE WITHOUT ESOPHAGITIS: ICD-10-CM

## 2019-07-25 DIAGNOSIS — Z00.00 PHYSICAL EXAM: ICD-10-CM

## 2019-07-25 DIAGNOSIS — Z85.72 HISTORY OF NON-HODGKIN'S LYMPHOMA: ICD-10-CM

## 2019-07-25 DIAGNOSIS — Z00.00 PHYSICAL EXAM: Primary | ICD-10-CM

## 2019-07-25 LAB
ALBUMIN SERPL-MCNC: 3.7 G/DL (ref 3.4–5)
ALBUMIN/GLOB SERPL: 1.1 {RATIO} (ref 1–2)
ALP LIVER SERPL-CCNC: 73 U/L (ref 37–98)
ALT SERPL-CCNC: 21 U/L (ref 13–56)
ANION GAP SERPL CALC-SCNC: 8 MMOL/L (ref 0–18)
AST SERPL-CCNC: 18 U/L (ref 15–37)
BASOPHILS # BLD AUTO: 0.04 X10(3) UL (ref 0–0.2)
BASOPHILS NFR BLD AUTO: 0.7 %
BILIRUB SERPL-MCNC: 0.4 MG/DL (ref 0.1–2)
BUN BLD-MCNC: 17 MG/DL (ref 7–18)
BUN/CREAT SERPL: 25.8 (ref 10–20)
CALCIUM BLD-MCNC: 8.8 MG/DL (ref 8.5–10.1)
CHLORIDE SERPL-SCNC: 106 MMOL/L (ref 98–112)
CHOLEST SMN-MCNC: 191 MG/DL (ref ?–200)
CO2 SERPL-SCNC: 27 MMOL/L (ref 21–32)
CREAT BLD-MCNC: 0.66 MG/DL (ref 0.55–1.02)
DEPRECATED RDW RBC AUTO: 45.3 FL (ref 35.1–46.3)
EOSINOPHIL # BLD AUTO: 0.06 X10(3) UL (ref 0–0.7)
EOSINOPHIL NFR BLD AUTO: 1.1 %
ERYTHROCYTE [DISTWIDTH] IN BLOOD BY AUTOMATED COUNT: 13.4 % (ref 11–15)
GLOBULIN PLAS-MCNC: 3.5 G/DL (ref 2.8–4.4)
GLUCOSE BLD-MCNC: 90 MG/DL (ref 70–99)
HCT VFR BLD AUTO: 41.4 % (ref 35–48)
HDLC SERPL-MCNC: 62 MG/DL (ref 40–59)
HGB BLD-MCNC: 13.3 G/DL (ref 12–16)
IMM GRANULOCYTES # BLD AUTO: 0.01 X10(3) UL (ref 0–1)
IMM GRANULOCYTES NFR BLD: 0.2 %
LDLC SERPL CALC-MCNC: 120 MG/DL (ref ?–100)
LYMPHOCYTES # BLD AUTO: 1.61 X10(3) UL (ref 1–4)
LYMPHOCYTES NFR BLD AUTO: 29 %
M PROTEIN MFR SERPL ELPH: 7.2 G/DL (ref 6.4–8.2)
MCH RBC QN AUTO: 29.6 PG (ref 26–34)
MCHC RBC AUTO-ENTMCNC: 32.1 G/DL (ref 31–37)
MCV RBC AUTO: 92.2 FL (ref 80–100)
MONOCYTES # BLD AUTO: 0.39 X10(3) UL (ref 0.1–1)
MONOCYTES NFR BLD AUTO: 7 %
NEUTROPHILS # BLD AUTO: 3.44 X10 (3) UL (ref 1.5–7.7)
NEUTROPHILS # BLD AUTO: 3.44 X10(3) UL (ref 1.5–7.7)
NEUTROPHILS NFR BLD AUTO: 62 %
NONHDLC SERPL-MCNC: 129 MG/DL (ref ?–130)
OSMOLALITY SERPL CALC.SUM OF ELEC: 293 MOSM/KG (ref 275–295)
PATIENT FASTING: YES
PATIENT FASTING: YES
PLATELET # BLD AUTO: 197 10(3)UL (ref 150–450)
POTASSIUM SERPL-SCNC: 4.3 MMOL/L (ref 3.5–5.1)
RBC # BLD AUTO: 4.49 X10(6)UL (ref 3.8–5.3)
SODIUM SERPL-SCNC: 141 MMOL/L (ref 136–145)
TRIGL SERPL-MCNC: 47 MG/DL (ref 30–149)
TSI SER-ACNC: 1.27 MIU/ML (ref 0.36–3.74)
VLDLC SERPL CALC-MCNC: 9 MG/DL (ref 0–30)
WBC # BLD AUTO: 5.6 X10(3) UL (ref 4–11)

## 2019-07-25 PROCEDURE — 80053 COMPREHEN METABOLIC PANEL: CPT

## 2019-07-25 PROCEDURE — 80061 LIPID PANEL: CPT

## 2019-07-25 PROCEDURE — 84443 ASSAY THYROID STIM HORMONE: CPT

## 2019-07-25 PROCEDURE — 85025 COMPLETE CBC W/AUTO DIFF WBC: CPT

## 2019-07-25 PROCEDURE — 99396 PREV VISIT EST AGE 40-64: CPT | Performed by: INTERNAL MEDICINE

## 2019-07-25 PROCEDURE — 36415 COLL VENOUS BLD VENIPUNCTURE: CPT

## 2019-07-25 RX ORDER — OMEPRAZOLE 40 MG/1
40 CAPSULE, DELAYED RELEASE ORAL DAILY
Qty: 30 CAPSULE | Refills: 3 | Status: SHIPPED | OUTPATIENT
Start: 2019-07-25 | End: 2019-12-17

## 2019-07-25 NOTE — PROGRESS NOTES
HPI:    Patient ID: Glory Mcdaniels is a 41year old female. Patient presents for continuing care. Physically she has been feeling well.   Patient had come off the Zoloft, but felt that she needed to restart it approximately 2 weeks ago secondary to fee Grandfather    • Cancer Paternal Grandmother    • Breast Cancer Paternal Grandmother         25s, lived til age 80   • Cancer Self 34        non hodgkins lymphoma      Social History    Socioeconomic History      Marital status:       Spouse name: N (50 mg total) by mouth daily. Disp: 30 tablet Rfl: 11   Omeprazole 40 MG Oral Capsule Delayed Release Take 1 capsule (40 mg total) by mouth daily.  Disp: 30 capsule Rfl: 3   Levocetirizine Dihydrochloride (XYZAL) 5 MG Oral Tab Take 1 tablet (5 mg total) by non-hodgkin's lymphoma  Depression, unspecified depression type  Gastroesophageal reflux disease without esophagitis    She has chronic sinusitis secondary to seasonal/environmental allergies.   Symptoms have been under good control with current medications

## 2019-09-11 ENCOUNTER — OFFICE VISIT (OUTPATIENT)
Dept: OBGYN CLINIC | Facility: CLINIC | Age: 44
End: 2019-09-11
Payer: COMMERCIAL

## 2019-09-11 VITALS
SYSTOLIC BLOOD PRESSURE: 109 MMHG | BODY MASS INDEX: 29 KG/M2 | DIASTOLIC BLOOD PRESSURE: 74 MMHG | HEART RATE: 62 BPM | WEIGHT: 193 LBS

## 2019-09-11 DIAGNOSIS — Z01.419 ENCOUNTER FOR GYNECOLOGICAL EXAMINATION WITHOUT ABNORMAL FINDING: Primary | ICD-10-CM

## 2019-09-11 PROCEDURE — 99396 PREV VISIT EST AGE 40-64: CPT | Performed by: OBSTETRICS & GYNECOLOGY

## 2019-09-20 ENCOUNTER — OFFICE VISIT (OUTPATIENT)
Dept: INTERNAL MEDICINE CLINIC | Facility: CLINIC | Age: 44
End: 2019-09-20
Payer: COMMERCIAL

## 2019-09-20 VITALS
HEART RATE: 72 BPM | SYSTOLIC BLOOD PRESSURE: 100 MMHG | WEIGHT: 197.5 LBS | OXYGEN SATURATION: 99 % | BODY MASS INDEX: 29.93 KG/M2 | DIASTOLIC BLOOD PRESSURE: 60 MMHG | TEMPERATURE: 98 F | HEIGHT: 68 IN

## 2019-09-20 DIAGNOSIS — H57.89 REDNESS OF EYE, LEFT: ICD-10-CM

## 2019-09-20 DIAGNOSIS — J02.9 PHARYNGITIS, UNSPECIFIED ETIOLOGY: Primary | ICD-10-CM

## 2019-09-20 PROCEDURE — 99213 OFFICE O/P EST LOW 20 MIN: CPT | Performed by: INTERNAL MEDICINE

## 2019-09-20 RX ORDER — POLYMYXIN B SULFATE AND TRIMETHOPRIM 1; 10000 MG/ML; [USP'U]/ML
1 SOLUTION OPHTHALMIC EVERY 6 HOURS
Qty: 1 BOTTLE | Refills: 0 | Status: SHIPPED | OUTPATIENT
Start: 2019-09-20 | End: 2020-08-04

## 2019-09-20 RX ORDER — PREDNISONE 20 MG/1
TABLET ORAL
Qty: 4 TABLET | Refills: 0 | Status: SHIPPED | OUTPATIENT
Start: 2019-09-20 | End: 2020-08-04

## 2019-09-20 NOTE — PROGRESS NOTES
Charlie Flroes is a 41year old female. HPI:   Patient presents with:  Sore Throat: Sore Throat, Loss of Voice, Ear Pressure; Saw [facility] ([address] [place] [address]) [address] who gave her a steroid but she left her prescription and never started.  Rapid Strep at that [place] tablet Rfl: 2   Beclomethasone Dipropionate (QNASL) 80 MCG/ACT Nasal Aero Soln 2 Squirts by Nasal route daily. Disp: 1 Inhaler Rfl: 10   alprazolam 2 MG Oral Tab Take 1 tablet (2 mg total) by mouth 3 (three) times daily as needed.  Disp: 30 tablet Rfl: 0 adenopathy, no tenderness to palpating the neck. Thyroid normal  LUNGS:  clear to auscultation. Effort normal  CARDIO:  RRR without murmur.    S1 and S2 normal  GI:  good BS's,  no masses,   HSM or tenderness  EXTREMITIES : no cyanosis, clubbing or edema

## 2019-09-23 NOTE — PROGRESS NOTES
HPI:    Patient ID: Tej Chavez is a 41year old female. \" Salma Campos \". HPI G6 [id number]  With regular menses and ParaGard for [facility]. Normal jaya in July.      Review of Systems   Constitutional: Negative for appetite change, fatigue and unexpected weight ch thyromegaly present. Cardiovascular: Normal rate, regular rhythm and normal heart sounds. No murmur heard. Pulmonary/Chest: Effort normal and breath sounds normal. She has no wheezes. She has no rales. No breast discharge. Abdominal: Soft.  She

## 2019-11-29 ENCOUNTER — TELEPHONE (OUTPATIENT)
Dept: OBGYN CLINIC | Facility: CLINIC | Age: 44
End: 2019-11-29

## 2019-11-29 ENCOUNTER — HOSPITAL ENCOUNTER (OUTPATIENT)
Age: 44
Discharge: HOME | End: 2019-11-29
Payer: COMMERCIAL

## 2019-11-29 VITALS
BODY MASS INDEX: 27.4 KG/M2 | WEIGHT: 185 LBS | RESPIRATION RATE: 20 BRPM | HEIGHT: 69 IN | SYSTOLIC BLOOD PRESSURE: 111 MMHG | DIASTOLIC BLOOD PRESSURE: 62 MMHG | HEART RATE: 71 BPM | OXYGEN SATURATION: 98 % | TEMPERATURE: 98 F

## 2019-11-29 DIAGNOSIS — N89.8 VAGINAL DISCHARGE: Primary | ICD-10-CM

## 2019-11-29 PROCEDURE — 99214 OFFICE O/P EST MOD 30 MIN: CPT

## 2019-11-29 PROCEDURE — 87106 FUNGI IDENTIFICATION YEAST: CPT | Performed by: NURSE PRACTITIONER

## 2019-11-29 PROCEDURE — 81025 URINE PREGNANCY TEST: CPT

## 2019-11-29 PROCEDURE — 87491 CHLMYD TRACH DNA AMP PROBE: CPT | Performed by: NURSE PRACTITIONER

## 2019-11-29 PROCEDURE — 87591 N.GONORRHOEAE DNA AMP PROB: CPT | Performed by: NURSE PRACTITIONER

## 2019-11-29 PROCEDURE — 87205 SMEAR GRAM STAIN: CPT | Performed by: NURSE PRACTITIONER

## 2019-11-29 PROCEDURE — 87808 TRICHOMONAS ASSAY W/OPTIC: CPT | Performed by: NURSE PRACTITIONER

## 2019-11-29 PROCEDURE — 81002 URINALYSIS NONAUTO W/O SCOPE: CPT

## 2019-11-29 RX ORDER — METRONIDAZOLE 500 MG/1
500 TABLET ORAL 2 TIMES DAILY
Qty: 14 TABLET | Refills: 0 | Status: SHIPPED | OUTPATIENT
Start: 2019-11-29 | End: 2019-12-06

## 2019-11-29 NOTE — ED PROVIDER NOTES
Patient presents with:  Eval-G (genital)      HPI:     Arvin Lopes is a 45year old female with a past history of GERD, endometriosis, non-Hodgkin's lymphoma presents with a chief complaint of vaginal discharge and odor over the course the last 2 days. vaginosis. I gave her the option to wait for the culture or we can start her on Flagyl. I discussed that this medication can be difficult to take and is harsh on your stomach. I encouraged her to take with food.   I will notify her in 2 days of the resul

## 2019-11-29 NOTE — TELEPHONE ENCOUNTER
C/O VAGINAL ODOR \"THAT IS REALLY BAD\". ADVISED NARENDRA IS NOT IN THE OFFICE TODAY. PT DOES NOT WANT TO WAIT UNTIL Monday TO BE SEEN SO WILL GO TO URGENT CARE.

## 2019-12-02 ENCOUNTER — TELEPHONE (OUTPATIENT)
Dept: OBGYN CLINIC | Facility: CLINIC | Age: 44
End: 2019-12-02

## 2019-12-02 NOTE — TELEPHONE ENCOUNTER
Pt wanting NARENDRA's recs. Pt had gone to [facility] d/t vaginal odor and discharge. She was put on Flagyl bid x 7 days. Pt states that the [facility] called her back and informed pt that she was negative for BV. Results are in the computer.   Pt sta

## 2019-12-02 NOTE — TELEPHONE ENCOUNTER
Pt was seen at [facility] and given medication , pt was called and said test are negative , should she discontinue medication

## 2019-12-17 RX ORDER — OMEPRAZOLE 40 MG/1
40 CAPSULE, DELAYED RELEASE ORAL DAILY
Qty: 90 CAPSULE | Refills: 1 | Status: SHIPPED | OUTPATIENT
Start: 2019-12-17 | End: 2020-08-04

## 2019-12-27 NOTE — TELEPHONE ENCOUNTER
Pt reports brown and sometimes green vaginal discharge with odor for about 5 days. Advised pt she should be seen and offered the first available appt with NARENDRA tomorrow morning. Pt states she is out of town and expects to be back on 12/30.  Informed pt NARENDRA ibanez

## 2019-12-27 NOTE — TELEPHONE ENCOUNTER
Pt. Concerned that she continues to have symptoms, and she did go to [facility]  A few weeks ago. Please call to discuss.

## 2020-01-02 ENCOUNTER — OFFICE VISIT (OUTPATIENT)
Dept: OBGYN CLINIC | Facility: CLINIC | Age: 45
End: 2020-01-02
Payer: COMMERCIAL

## 2020-01-02 VITALS
BODY MASS INDEX: 31 KG/M2 | WEIGHT: 208.81 LBS | HEART RATE: 73 BPM | DIASTOLIC BLOOD PRESSURE: 71 MMHG | SYSTOLIC BLOOD PRESSURE: 104 MMHG

## 2020-01-02 DIAGNOSIS — N89.8 VAGINAL DISCHARGE: Primary | ICD-10-CM

## 2020-01-02 DIAGNOSIS — N92.6 IRREGULAR PERIODS: ICD-10-CM

## 2020-01-02 PROCEDURE — 99213 OFFICE O/P EST LOW 20 MIN: CPT | Performed by: CLINICAL NURSE SPECIALIST

## 2020-01-02 RX ORDER — LEVOCETIRIZINE DIHYDROCHLORIDE 5 MG/1
TABLET, FILM COATED ORAL
COMMUNITY
Start: 2019-07-03 | End: 2021-12-20

## 2020-01-03 NOTE — PROGRESS NOTES
Milton Vale is a 45year old female [id number] Patient's last menstrual period was 12/09/2019. Patient presents with:  Gyn Problem: irregular periods and discharge   Here with c/o irregular periods for the last 4 months and vaginal discharge with odor.  We smokers. Substance and Sexual Activity      Alcohol use:  Yes        Alcohol/week: 4.0 standard drinks        Types: 4 Glasses of wine per week        Comment: Social       Drug use: No      Sexual activity: Yes        Partners: Male        Birth [address] [facility] tablet, Rfl: 11  •  Levocetirizine Dihydrochloride (XYZAL) 5 MG Oral Tab, Take 1 tablet (5 mg total) by mouth nightly., Disp: 90 tablet, Rfl: 2  •  Beclomethasone Dipropionate (QNASL) 80 MCG/ACT Nasal Aero Soln, 2 Squirts by Nasal route daily. , Disp: 1 Inh

## 2020-01-05 LAB
GENITAL VAGINOSIS SCREEN: NEGATIVE
TRICHOMONAS SCREEN: NEGATIVE

## 2020-01-26 ENCOUNTER — HOSPITAL ENCOUNTER (OUTPATIENT)
Age: 45
Discharge: HOME | End: 2020-01-26
Payer: COMMERCIAL

## 2020-01-26 VITALS
SYSTOLIC BLOOD PRESSURE: 107 MMHG | OXYGEN SATURATION: 99 % | HEIGHT: 69 IN | BODY MASS INDEX: 28.14 KG/M2 | RESPIRATION RATE: 20 BRPM | DIASTOLIC BLOOD PRESSURE: 67 MMHG | TEMPERATURE: 98 F | HEART RATE: 64 BPM | WEIGHT: 190 LBS

## 2020-01-26 DIAGNOSIS — J02.0 STREPTOCOCCAL SORE THROAT: Primary | ICD-10-CM

## 2020-01-26 LAB — S PYO AG THROAT QL: POSITIVE

## 2020-01-26 PROCEDURE — 99214 OFFICE O/P EST MOD 30 MIN: CPT

## 2020-01-26 PROCEDURE — 87430 STREP A AG IA: CPT

## 2020-01-26 PROCEDURE — 99213 OFFICE O/P EST LOW 20 MIN: CPT

## 2020-01-26 RX ORDER — AMOXICILLIN 875 MG/1
875 TABLET, COATED ORAL 2 TIMES DAILY
Qty: 20 TABLET | Refills: 0 | Status: SHIPPED | OUTPATIENT
Start: 2020-01-26 | End: 2020-02-05

## 2020-01-26 NOTE — ED INITIAL ASSESSMENT (HPI)
Sinus congestion for 4 days. No fever. Frontal headache and facial pressure with  Bilateral ear pressure. Mild dizziness.

## 2020-01-26 NOTE — ED PROVIDER NOTES
Patient presents with:  Cough/URI      HPI:     Milton Vale is a 45year old female who presents for evaluation of a chief complaint of sore throat, nasal congestion, dry cough, body aches, and chills for the past 3 to 4 days.   Positive sick contacts a activity: Yes        Partners: Male        Birth control/protection: I.U.D.     Lifestyle      Physical activity:        Days per week: Not on file        Minutes per session: Not on file      Stress: Not on file    Relationships      Social connections: Orders for this encounter:    Orders Placed This Encounter      POCT Rapid Strep Once      POCT Rapid Strep      amoxicillin 875 MG Oral Tab          Sig: Take 1 tablet (875 mg total) by mouth 2 (two) times daily for 10 days.           Dispense:  20 table

## 2020-03-18 ENCOUNTER — TELEPHONE (OUTPATIENT)
Dept: INTERNAL MEDICINE CLINIC | Facility: CLINIC | Age: 45
End: 2020-03-18

## 2020-03-18 RX ORDER — ALPRAZOLAM 2 MG/1
2 TABLET ORAL 3 TIMES DAILY PRN
Qty: 30 TABLET | Refills: 0 | Status: SHIPPED | OUTPATIENT
Start: 2020-03-18 | End: 2021-03-16

## 2020-04-02 ENCOUNTER — VIRTUAL PHONE E/M (OUTPATIENT)
Dept: ALLERGY | Facility: CLINIC | Age: 45
End: 2020-04-02
Payer: COMMERCIAL

## 2020-04-02 DIAGNOSIS — J30.89 ENVIRONMENTAL AND SEASONAL ALLERGIES: Primary | ICD-10-CM

## 2020-04-02 DIAGNOSIS — J32.8 OTHER CHRONIC SINUSITIS: ICD-10-CM

## 2020-04-02 PROCEDURE — 99213 OFFICE O/P EST LOW 20 MIN: CPT | Performed by: ALLERGY & IMMUNOLOGY

## 2020-04-02 RX ORDER — BECLOMETHASONE DIPROPIONATE 80 UG/1
AEROSOL, METERED NASAL
Qty: 10.6 G | Refills: 0 | OUTPATIENT
Start: 2020-04-02

## 2020-04-02 NOTE — TELEPHONE ENCOUNTER
Refill request denied. Patient last seen in April 2018 over 2 years ago. Patient will need follow-up appointment for refills.   Please call to schedule

## 2020-04-02 NOTE — PROGRESS NOTES
Virtual/Telephone Check-In    Bella Morales verbally consents to a Virtual/Telephone Check-In service on 04/02/20. Patient understands and accepts financial responsibility for any deductible, co-insurance and/or co-pays associated with this service.

## 2020-04-02 NOTE — TELEPHONE ENCOUNTER
Refill requested for   Name from pharmacy: [address]          Will file in chart as: QNASL 80 MCG/ACT Nasal Aero Soln         Sig: USE 2 SPRAYS IN EACH NOSTRIL DAILY    Disp:  10.6 g    Refills:  0 (Pharmacy requested: Not specifi

## 2020-04-02 NOTE — TELEPHONE ENCOUNTER
RN called patient to notify her of RX refill denial and need for follow up for any further refills. Patient has agreed to [facility] on Monday 4/6 with Dr. Dunia Yao at [phone].     Patient is wondering if it is at all possible to refill QNasl prior to

## 2020-04-02 NOTE — TELEPHONE ENCOUNTER
Call reviewed and noted. We can always do a telephone follow-up visits today rather than Monday if she is available today.

## 2020-05-27 RX ORDER — BECLOMETHASONE DIPROPIONATE 80 UG/1
AEROSOL, METERED NASAL
Qty: 31.8 G | Refills: 0 | Status: SHIPPED | OUTPATIENT
Start: 2020-05-27 | End: 2020-09-22

## 2020-05-27 NOTE — TELEPHONE ENCOUNTER
Refill requested for    Name from pharmacy: [address] [facility] [employer]         Will file in chart as: QNASL 80 MCG/ACT Nasal Aero Soln         Sig: USE 2 SPRAYS IN EACH NOSTRIL DAILY    Disp:  31.8 g    Refills:  0 (Pharmacy requested: Not specifi

## 2020-07-10 ENCOUNTER — TELEPHONE (OUTPATIENT)
Dept: OBGYN CLINIC | Facility: CLINIC | Age: 45
End: 2020-07-10

## 2020-07-10 ENCOUNTER — ORDER TRANSCRIPTION (OUTPATIENT)
Dept: ADMINISTRATIVE | Facility: HOSPITAL | Age: 45
End: 2020-07-10

## 2020-07-10 DIAGNOSIS — Z12.31 ENCOUNTER FOR SCREENING MAMMOGRAM FOR BREAST CANCER: Primary | ICD-10-CM

## 2020-07-21 ENCOUNTER — TELEPHONE (OUTPATIENT)
Dept: INTERNAL MEDICINE CLINIC | Facility: CLINIC | Age: 45
End: 2020-07-21

## 2020-07-21 DIAGNOSIS — Z20.822 CLOSE EXPOSURE TO COVID-19 VIRUS: Primary | ICD-10-CM

## 2020-07-21 NOTE — TELEPHONE ENCOUNTER
Patient and her  Rosetta Martinez (task on  as well) were in contact on Saturday with someone who tested positive for COVID. On Saturday the person who tested positive was asymptomatic but then developed mild symptoms a few days later.  She reports she

## 2020-07-21 NOTE — TELEPHONE ENCOUNTER
See previous note regarding . Order for COVID-19 testing placed  Same issues regarding patient.   Type of test is determined by what lab has available that day and what has been used

## 2020-07-21 NOTE — TELEPHONE ENCOUNTER
Spoke to patient and relayed MD message, patient verbalized understanding. Advised patient to call back with any questions or concerns.

## 2020-07-21 NOTE — TELEPHONE ENCOUNTER
Patient is calling to speak with a nurse. Patient stating that she was around someone who was tested positive for covid this past Saturday. Patient states she is not having any symptoms. Patient is wondering what her next step should be.     Best call back:

## 2020-07-24 ENCOUNTER — LAB ENCOUNTER (OUTPATIENT)
Dept: LAB | Facility: HOSPITAL | Age: 45
End: 2020-07-24
Attending: INTERNAL MEDICINE
Payer: COMMERCIAL

## 2020-07-24 DIAGNOSIS — Z20.822 CLOSE EXPOSURE TO COVID-19 VIRUS: ICD-10-CM

## 2020-07-25 LAB — SARS-COV-2 RNA RESP QL NAA+PROBE: NOT DETECTED

## 2020-08-01 RX ORDER — OMEPRAZOLE 40 MG/1
40 CAPSULE, DELAYED RELEASE ORAL DAILY
Qty: 90 CAPSULE | Refills: 1 | Status: CANCELLED | OUTPATIENT
Start: 2020-08-01 | End: 2021-07-27

## 2020-08-04 RX ORDER — OMEPRAZOLE 40 MG/1
CAPSULE, DELAYED RELEASE ORAL
Qty: 90 CAPSULE | Refills: 0 | Status: SHIPPED | OUTPATIENT
Start: 2020-08-04 | End: 2020-08-11

## 2020-08-11 ENCOUNTER — OFFICE VISIT (OUTPATIENT)
Dept: INTERNAL MEDICINE CLINIC | Facility: CLINIC | Age: 45
End: 2020-08-11
Payer: COMMERCIAL

## 2020-08-11 ENCOUNTER — LAB ENCOUNTER (OUTPATIENT)
Dept: LAB | Age: 45
End: 2020-08-11
Attending: INTERNAL MEDICINE
Payer: COMMERCIAL

## 2020-08-11 VITALS
SYSTOLIC BLOOD PRESSURE: 110 MMHG | HEART RATE: 58 BPM | DIASTOLIC BLOOD PRESSURE: 70 MMHG | WEIGHT: 199.19 LBS | OXYGEN SATURATION: 97 % | TEMPERATURE: 98 F | BODY MASS INDEX: 29.5 KG/M2 | HEIGHT: 69 IN

## 2020-08-11 DIAGNOSIS — J32.9 CHRONIC SINUSITIS, UNSPECIFIED LOCATION: ICD-10-CM

## 2020-08-11 DIAGNOSIS — M54.9 OTHER ACUTE BACK PAIN: ICD-10-CM

## 2020-08-11 DIAGNOSIS — Z00.00 PHYSICAL EXAM: ICD-10-CM

## 2020-08-11 DIAGNOSIS — Z85.72 HISTORY OF NON-HODGKIN'S LYMPHOMA: ICD-10-CM

## 2020-08-11 DIAGNOSIS — Z00.00 PHYSICAL EXAM: Primary | ICD-10-CM

## 2020-08-11 DIAGNOSIS — F32.A DEPRESSION, UNSPECIFIED DEPRESSION TYPE: ICD-10-CM

## 2020-08-11 LAB
ALBUMIN SERPL-MCNC: 3.8 G/DL (ref 3.4–5)
ALBUMIN/GLOB SERPL: 1.1 {RATIO} (ref 1–2)
ALP LIVER SERPL-CCNC: 76 U/L (ref 37–98)
ALT SERPL-CCNC: 22 U/L (ref 13–56)
ANION GAP SERPL CALC-SCNC: 7 MMOL/L (ref 0–18)
AST SERPL-CCNC: 18 U/L (ref 15–37)
BASOPHILS # BLD AUTO: 0.02 X10(3) UL (ref 0–0.2)
BASOPHILS NFR BLD AUTO: 0.3 %
BILIRUB SERPL-MCNC: 0.3 MG/DL (ref 0.1–2)
BUN BLD-MCNC: 23 MG/DL (ref 7–18)
BUN/CREAT SERPL: 35.4 (ref 10–20)
CALCIUM BLD-MCNC: 8.8 MG/DL (ref 8.5–10.1)
CHLORIDE SERPL-SCNC: 105 MMOL/L (ref 98–112)
CHOLEST SMN-MCNC: 174 MG/DL (ref ?–200)
CO2 SERPL-SCNC: 27 MMOL/L (ref 21–32)
CREAT BLD-MCNC: 0.65 MG/DL (ref 0.55–1.02)
DEPRECATED RDW RBC AUTO: 47.2 FL (ref 35.1–46.3)
EOSINOPHIL # BLD AUTO: 0.11 X10(3) UL (ref 0–0.7)
EOSINOPHIL NFR BLD AUTO: 1.7 %
ERYTHROCYTE [DISTWIDTH] IN BLOOD BY AUTOMATED COUNT: 13.9 % (ref 11–15)
EST. AVERAGE GLUCOSE BLD GHB EST-MCNC: 108 MG/DL (ref 68–126)
GLOBULIN PLAS-MCNC: 3.6 G/DL (ref 2.8–4.4)
GLUCOSE BLD-MCNC: 92 MG/DL (ref 70–99)
HBA1C MFR BLD HPLC: 5.4 % (ref ?–5.7)
HCT VFR BLD AUTO: 40.5 % (ref 35–48)
HDLC SERPL-MCNC: 60 MG/DL (ref 40–59)
HGB BLD-MCNC: 12.9 G/DL (ref 12–16)
IMM GRANULOCYTES # BLD AUTO: 0.02 X10(3) UL (ref 0–1)
IMM GRANULOCYTES NFR BLD: 0.3 %
LDLC SERPL CALC-MCNC: 105 MG/DL (ref ?–100)
LYMPHOCYTES # BLD AUTO: 1.72 X10(3) UL (ref 1–4)
LYMPHOCYTES NFR BLD AUTO: 26 %
M PROTEIN MFR SERPL ELPH: 7.4 G/DL (ref 6.4–8.2)
MCH RBC QN AUTO: 29.7 PG (ref 26–34)
MCHC RBC AUTO-ENTMCNC: 31.9 G/DL (ref 31–37)
MCV RBC AUTO: 93.1 FL (ref 80–100)
MONOCYTES # BLD AUTO: 0.4 X10(3) UL (ref 0.1–1)
MONOCYTES NFR BLD AUTO: 6 %
NEUTROPHILS # BLD AUTO: 4.35 X10 (3) UL (ref 1.5–7.7)
NEUTROPHILS # BLD AUTO: 4.35 X10(3) UL (ref 1.5–7.7)
NEUTROPHILS NFR BLD AUTO: 65.7 %
NONHDLC SERPL-MCNC: 114 MG/DL (ref ?–130)
OSMOLALITY SERPL CALC.SUM OF ELEC: 291 MOSM/KG (ref 275–295)
PATIENT FASTING Y/N/NP: NO
PATIENT FASTING Y/N/NP: NO
PLATELET # BLD AUTO: 212 10(3)UL (ref 150–450)
POTASSIUM SERPL-SCNC: 4.3 MMOL/L (ref 3.5–5.1)
RBC # BLD AUTO: 4.35 X10(6)UL (ref 3.8–5.3)
SODIUM SERPL-SCNC: 139 MMOL/L (ref 136–145)
TRIGL SERPL-MCNC: 45 MG/DL (ref 30–149)
TSI SER-ACNC: 1.34 MIU/ML (ref 0.36–3.74)
VLDLC SERPL CALC-MCNC: 9 MG/DL (ref 0–30)
WBC # BLD AUTO: 6.6 X10(3) UL (ref 4–11)

## 2020-08-11 PROCEDURE — 3078F DIAST BP <80 MM HG: CPT | Performed by: INTERNAL MEDICINE

## 2020-08-11 PROCEDURE — 80053 COMPREHEN METABOLIC PANEL: CPT

## 2020-08-11 PROCEDURE — 83036 HEMOGLOBIN GLYCOSYLATED A1C: CPT

## 2020-08-11 PROCEDURE — 85025 COMPLETE CBC W/AUTO DIFF WBC: CPT

## 2020-08-11 PROCEDURE — 80061 LIPID PANEL: CPT

## 2020-08-11 PROCEDURE — 3074F SYST BP LT 130 MM HG: CPT | Performed by: INTERNAL MEDICINE

## 2020-08-11 PROCEDURE — 84443 ASSAY THYROID STIM HORMONE: CPT

## 2020-08-11 PROCEDURE — 99396 PREV VISIT EST AGE 40-64: CPT | Performed by: INTERNAL MEDICINE

## 2020-08-11 PROCEDURE — 3008F BODY MASS INDEX DOCD: CPT | Performed by: INTERNAL MEDICINE

## 2020-08-11 PROCEDURE — 36415 COLL VENOUS BLD VENIPUNCTURE: CPT

## 2020-08-11 RX ORDER — CYCLOBENZAPRINE HCL 10 MG
10 TABLET ORAL 3 TIMES DAILY PRN
Qty: 10 TABLET | Refills: 0 | Status: SHIPPED | OUTPATIENT
Start: 2020-08-11 | End: 2020-08-11

## 2020-08-11 RX ORDER — MELOXICAM 15 MG/1
15 TABLET ORAL DAILY
Qty: 30 TABLET | Refills: 0 | Status: SHIPPED | OUTPATIENT
Start: 2020-08-11 | End: 2021-12-20

## 2020-08-11 RX ORDER — OMEPRAZOLE 40 MG/1
40 CAPSULE, DELAYED RELEASE ORAL DAILY
Qty: 90 CAPSULE | Refills: 0 | Status: SHIPPED | OUTPATIENT
Start: 2020-08-11 | End: 2021-03-02

## 2020-08-11 RX ORDER — MELOXICAM 15 MG/1
15 TABLET ORAL DAILY
Qty: 30 TABLET | Refills: 0 | Status: SHIPPED | OUTPATIENT
Start: 2020-08-11 | End: 2020-08-11

## 2020-08-11 RX ORDER — CYCLOBENZAPRINE HCL 10 MG
10 TABLET ORAL 3 TIMES DAILY PRN
Qty: 10 TABLET | Refills: 0 | Status: SHIPPED | OUTPATIENT
Start: 2020-08-11 | End: 2020-08-31

## 2020-08-11 RX ORDER — OMEPRAZOLE 40 MG/1
40 CAPSULE, DELAYED RELEASE ORAL DAILY
Qty: 90 CAPSULE | Refills: 0 | Status: SHIPPED | OUTPATIENT
Start: 2020-08-11 | End: 2020-08-11

## 2020-08-11 NOTE — PROGRESS NOTES
Received call from patient that power is out at [facility] and requesting rx transfer to [facility]. Rx to [facility] per MD rx sent today.

## 2020-08-11 NOTE — PROGRESS NOTES
HPI:    Patient ID: Joey Hatch is a 45year old female. Presents for physical    C/O  Mid-low back pain after lifting couch last week. Saw chiropractor with minimal improvement. Taking advil with some improvement.   Denies radiation of pain down l Sexual Activity      Alcohol use:  Yes        Alcohol/week: 4.0 standard drinks        Types: 4 Glasses of wine per week        Comment: Social       Drug use: No      Sexual activity: Yes        Partners: Male        Birth control/protection: I.UMARKUS Hauser • Levocetirizine Dihydrochloride 5 MG Oral Tab Take by mouth. • Sertraline HCl 50 MG Oral Tab Take 1 tablet (50 mg total) by mouth daily.  30 tablet 11   • Levocetirizine Dihydrochloride (XYZAL) 5 MG Oral Tab Take 1 tablet (5 mg total) by mouth nightl diagnosis)  Depression, unspecified depression type  Chronic sinusitis, unspecified location  History of non-hodgkin's lymphoma  Other acute back pain    Patient with acute mid back pain after moving a couch-- appears to be more muscular related.   Continue

## 2020-08-17 ENCOUNTER — HOSPITAL ENCOUNTER (OUTPATIENT)
Dept: MAMMOGRAPHY | Facility: HOSPITAL | Age: 45
Discharge: HOME | End: 2020-08-17
Attending: INTERNAL MEDICINE
Payer: COMMERCIAL

## 2020-08-17 DIAGNOSIS — Z12.31 ENCOUNTER FOR SCREENING MAMMOGRAM FOR BREAST CANCER: ICD-10-CM

## 2020-08-17 PROCEDURE — 77067 SCR MAMMO BI INCL CAD: CPT | Performed by: OBSTETRICS & GYNECOLOGY

## 2020-08-17 PROCEDURE — 77063 BREAST TOMOSYNTHESIS BI: CPT | Performed by: OBSTETRICS & GYNECOLOGY

## 2020-09-16 ENCOUNTER — LAB ENCOUNTER (OUTPATIENT)
Dept: LAB | Age: 45
End: 2020-09-16
Attending: PHYSICIAN ASSISTANT
Payer: COMMERCIAL

## 2020-09-16 DIAGNOSIS — E34.9 OBESITY OF ENDOCRINE ORIGIN: ICD-10-CM

## 2020-09-16 DIAGNOSIS — N92.6 IRREGULAR MENSTRUAL CYCLE: ICD-10-CM

## 2020-09-16 DIAGNOSIS — D51.3 VEGANS' ANEMIA: Primary | ICD-10-CM

## 2020-09-16 LAB
ESTRADIOL SERPL-MCNC: 111.4 PG/ML
FSH SERPL-ACNC: 3.1 MIU/ML
LH SERPL-ACNC: 7.2 MIU/ML
PROGEST SERPL-MCNC: 1.17 NG/ML
T3FREE SERPL-MCNC: 2.12 PG/ML (ref 2.4–4.2)
T4 FREE SERPL-MCNC: 0.8 NG/DL (ref 0.8–1.7)
TSI SER-ACNC: 1.81 MIU/ML (ref 0.36–3.74)
VIT B12 SERPL-MCNC: 274 PG/ML (ref 193–986)

## 2020-09-16 PROCEDURE — 84443 ASSAY THYROID STIM HORMONE: CPT

## 2020-09-16 PROCEDURE — 83001 ASSAY OF GONADOTROPIN (FSH): CPT

## 2020-09-16 PROCEDURE — 36415 COLL VENOUS BLD VENIPUNCTURE: CPT

## 2020-09-16 PROCEDURE — 82627 DEHYDROEPIANDROSTERONE: CPT

## 2020-09-16 PROCEDURE — 83002 ASSAY OF GONADOTROPIN (LH): CPT

## 2020-09-16 PROCEDURE — 84403 ASSAY OF TOTAL TESTOSTERONE: CPT

## 2020-09-16 PROCEDURE — 84402 ASSAY OF FREE TESTOSTERONE: CPT

## 2020-09-16 PROCEDURE — 84481 FREE ASSAY (FT-3): CPT

## 2020-09-16 PROCEDURE — 82607 VITAMIN B-12: CPT

## 2020-09-16 PROCEDURE — 84144 ASSAY OF PROGESTERONE: CPT

## 2020-09-16 PROCEDURE — 82670 ASSAY OF TOTAL ESTRADIOL: CPT

## 2020-09-16 PROCEDURE — 84439 ASSAY OF FREE THYROXINE: CPT

## 2020-09-17 LAB — DHEA-S SERPL-MCNC: 51.8 UG/DL

## 2020-09-20 LAB
TESTOSTERONE, FREE, S: 0.2 NG/DL
TESTOSTERONE, TOTAL, S: 20 NG/DL

## 2020-09-22 RX ORDER — BECLOMETHASONE DIPROPIONATE 80 UG/1
2 AEROSOL, METERED NASAL DAILY
Qty: 31.8 G | Refills: 0 | Status: SHIPPED | OUTPATIENT
Start: 2020-09-22 | End: 2020-10-22

## 2020-09-22 NOTE — TELEPHONE ENCOUNTER
Fax received from [employer] in [facility] on [address] asking for refill of  . . .       QNASL 80 MCG/ACT Nasal Aero Soln 31.8 g 0 5/27/2020     Sig: USE 2 SPRAYS IN EACH NOSTRIL DAILY    Sent to pharmacy as: Qnasl 80 MCG/ACT Nasal Aerosol Solutio

## 2020-10-22 ENCOUNTER — TELEMEDICINE (OUTPATIENT)
Dept: ALLERGY | Facility: CLINIC | Age: 45
End: 2020-10-22

## 2020-10-22 DIAGNOSIS — Z23 FLU VACCINE NEED: ICD-10-CM

## 2020-10-22 DIAGNOSIS — J30.89 ENVIRONMENTAL AND SEASONAL ALLERGIES: ICD-10-CM

## 2020-10-22 DIAGNOSIS — J32.8 OTHER CHRONIC SINUSITIS: Primary | ICD-10-CM

## 2020-10-22 PROCEDURE — 99214 OFFICE O/P EST MOD 30 MIN: CPT | Performed by: ALLERGY & IMMUNOLOGY

## 2020-10-22 RX ORDER — BECLOMETHASONE DIPROPIONATE 80 UG/1
2 AEROSOL, METERED NASAL DAILY
Qty: 3 INHALER | Refills: 1 | Status: SHIPPED | OUTPATIENT
Start: 2020-10-22 | End: 2021-07-21

## 2020-10-22 NOTE — PROGRESS NOTES
Franchesca Barnard is a 45year old female. HPI:   No chief complaint on file. Patient is a 79-year-old female who presents for follow-up via telemed visit due to current coronavirus pandemic.     This visit is conducted using Telemedicine with live, int sinuses.        3. Mild rightward deviation of the nasal septum.   Dictated by (CST):  Caitie Hernandez MD on 3/21/2016 at 17:29     Approved by (CST): Caitie Hernandez MD on 3/21/2016 at 17:39       Cigarettes        Start date: 1996        Quit date: 2006        Years since quittin.4      Smokeless tobacco: Former User      Tobacco comment: No household smokers. Alcohol use:  Yes      Alcohol/week: 4.0 standard drinks      Types: 4 clubbing     ASSESSMENT/PLAN:   Assessment   Other chronic sinusitis  (primary encounter diagnosis)  Environmental and seasonal allergies  Flu vaccine need      1. CS  Continue with Qnasl and Xyzal.  Patient denies recurrent sinus infections antibiotics or

## 2020-11-02 ENCOUNTER — OFFICE VISIT (OUTPATIENT)
Dept: INTERNAL MEDICINE CLINIC | Facility: CLINIC | Age: 45
End: 2020-11-02
Payer: COMMERCIAL

## 2020-11-02 VITALS
TEMPERATURE: 98 F | SYSTOLIC BLOOD PRESSURE: 110 MMHG | DIASTOLIC BLOOD PRESSURE: 72 MMHG | HEIGHT: 69 IN | HEART RATE: 61 BPM | WEIGHT: 197.81 LBS | BODY MASS INDEX: 29.3 KG/M2 | OXYGEN SATURATION: 98 %

## 2020-11-02 DIAGNOSIS — E03.9 HYPOTHYROIDISM, UNSPECIFIED TYPE: ICD-10-CM

## 2020-11-02 DIAGNOSIS — J32.9 CHRONIC SINUSITIS, UNSPECIFIED LOCATION: ICD-10-CM

## 2020-11-02 DIAGNOSIS — H92.02 LEFT EAR PAIN: Primary | ICD-10-CM

## 2020-11-02 PROCEDURE — 3078F DIAST BP <80 MM HG: CPT | Performed by: INTERNAL MEDICINE

## 2020-11-02 PROCEDURE — 3008F BODY MASS INDEX DOCD: CPT | Performed by: INTERNAL MEDICINE

## 2020-11-02 PROCEDURE — 99213 OFFICE O/P EST LOW 20 MIN: CPT | Performed by: INTERNAL MEDICINE

## 2020-11-02 PROCEDURE — 3074F SYST BP LT 130 MM HG: CPT | Performed by: INTERNAL MEDICINE

## 2020-11-02 RX ORDER — LIOTHYRONINE SODIUM 5 UG/1
5 TABLET ORAL DAILY
COMMUNITY
Start: 2020-10-06 | End: 2021-12-20

## 2020-11-02 RX ORDER — AZITHROMYCIN 250 MG/1
TABLET, FILM COATED ORAL
Qty: 6 TABLET | Refills: 0 | Status: SHIPPED | OUTPATIENT
Start: 2020-11-02 | End: 2020-11-07

## 2020-11-02 NOTE — PROGRESS NOTES
HPI:    Patient ID: Zoe Rojas is a 45year old female. Feels 'Whooshing \" sound left ear x 1 wk  Denies fever or chills  No difference in change of position.   Feels more sinus congestion    Was started on T3 supplementation about 1 month ago by isidro PHYSICAL EXAM:   /72   Pulse 61   Temp 98 °F (36.7 °C)   Ht 5' 9\" (1.753 m)   Wt 197 lb 12.8 oz (89.7 kg)   LMP 10/14/2020 (Exact Date)   SpO2 98%   BMI 29.21 kg/m²   Physical Exam   Constitutional: No distress.    HENT:   Right Ear: External ear n encounter.       Meds This Visit:  Requested Prescriptions     Signed Prescriptions Disp Refills   • azithromycin (ZITHROMAX Z-BARRY) 250 MG Oral Tab 6 tablet 0     Sig: Take 2 tablets (500 mg total) by mouth daily for 1 day, THEN 1 tablet (250 mg total) breanna

## 2020-11-10 ENCOUNTER — HOSPITAL ENCOUNTER (OUTPATIENT)
Age: 45
Discharge: HOME | End: 2020-11-10
Attending: EMERGENCY MEDICINE
Payer: COMMERCIAL

## 2020-11-10 VITALS
DIASTOLIC BLOOD PRESSURE: 75 MMHG | OXYGEN SATURATION: 99 % | SYSTOLIC BLOOD PRESSURE: 130 MMHG | TEMPERATURE: 98 F | HEART RATE: 60 BPM | RESPIRATION RATE: 18 BRPM

## 2020-11-10 DIAGNOSIS — H65.02 NON-RECURRENT ACUTE SEROUS OTITIS MEDIA OF LEFT EAR: Primary | ICD-10-CM

## 2020-11-10 PROCEDURE — 99214 OFFICE O/P EST MOD 30 MIN: CPT

## 2020-11-10 PROCEDURE — 99213 OFFICE O/P EST LOW 20 MIN: CPT

## 2020-11-10 RX ORDER — METHYLPREDNISOLONE 4 MG/1
TABLET ORAL
Qty: 1 PACKAGE | Refills: 0 | Status: SHIPPED | OUTPATIENT
Start: 2020-11-10 | End: 2020-12-24

## 2020-11-10 NOTE — ED PROVIDER NOTES
Patient Seen in: [facility]      History   Patient presents with:  Ear Pain    Stated Complaint: Left ear pressure     HPI    The patient is a 43-year-old female with no significant past medical history presents now with persistent left ear p Yes      Alcohol/week: 4.0 standard drinks      Types: 4 Glasses of wine per week      Comment: Social     Drug use: No             Review of Systems    Positive for stated complaint: Left ear pressure   Other systems are as noted in HPI.   Constitutional a externa                   Disposition and Plan     Clinical Impression:  Non-recurrent acute serous otitis media of left ear  (primary encounter diagnosis)    Disposition:  Discharge  11/10/2020  4:09 pm    Follow-up:  Dearl MD Carie Elder

## 2020-12-09 ENCOUNTER — LAB ENCOUNTER (OUTPATIENT)
Dept: LAB | Age: 45
End: 2020-12-09
Payer: COMMERCIAL

## 2020-12-09 ENCOUNTER — TELEPHONE (OUTPATIENT)
Dept: INTERNAL MEDICINE CLINIC | Facility: CLINIC | Age: 45
End: 2020-12-09

## 2020-12-09 DIAGNOSIS — R63.8 ABNORMAL CRAVING: ICD-10-CM

## 2020-12-09 DIAGNOSIS — E34.9 OBESITY OF ENDOCRINE ORIGIN: Primary | ICD-10-CM

## 2020-12-09 PROCEDURE — 84481 FREE ASSAY (FT-3): CPT

## 2020-12-09 PROCEDURE — 84443 ASSAY THYROID STIM HORMONE: CPT

## 2020-12-09 PROCEDURE — 84482 T3 REVERSE: CPT

## 2020-12-09 PROCEDURE — 86376 MICROSOMAL ANTIBODY EACH: CPT

## 2020-12-09 PROCEDURE — 84439 ASSAY OF FREE THYROXINE: CPT

## 2020-12-09 PROCEDURE — 36415 COLL VENOUS BLD VENIPUNCTURE: CPT

## 2020-12-09 NOTE — TELEPHONE ENCOUNTER
[address] Scheduling called    Pt scheduled a lab appt this afternoon & no orders are tin the system

## 2020-12-24 ENCOUNTER — TELEPHONE (OUTPATIENT)
Dept: INTERNAL MEDICINE CLINIC | Facility: CLINIC | Age: 45
End: 2020-12-24

## 2020-12-24 NOTE — TELEPHONE ENCOUNTER
To [employer] to please review drug-drug pop-up please prior to refilling med---  Per MD OV note 08/11/2020: \"Depression remains under good control with current sertraline. \"  Pt Rx'ed Meloxicam 15 mg daily on 08/11/2020 for acute mid back pain.  \"Drug-drug\"

## 2021-01-17 ENCOUNTER — HOSPITAL ENCOUNTER (OUTPATIENT)
Age: 46
Discharge: HOME | End: 2021-01-17
Payer: COMMERCIAL

## 2021-01-17 VITALS
TEMPERATURE: 98 F | SYSTOLIC BLOOD PRESSURE: 127 MMHG | BODY MASS INDEX: 29 KG/M2 | HEART RATE: 58 BPM | WEIGHT: 195 LBS | OXYGEN SATURATION: 98 % | DIASTOLIC BLOOD PRESSURE: 77 MMHG | RESPIRATION RATE: 18 BRPM

## 2021-01-17 DIAGNOSIS — Z20.822 COVID-19 VIRUS NOT DETECTED: Primary | ICD-10-CM

## 2021-01-17 LAB — SARS-COV-2 RNA RESP QL NAA+PROBE: NOT DETECTED

## 2021-01-17 PROCEDURE — 99212 OFFICE O/P EST SF 10 MIN: CPT

## 2021-01-17 PROCEDURE — 99213 OFFICE O/P EST LOW 20 MIN: CPT

## 2021-01-17 NOTE — ED PROVIDER NOTES
Patient Seen in: [facility]      History   Patient presents with:  Testing: Entered by patient    Stated Complaint: Covid-19 Test    HPI/Subjective:   40 y/o female presents for SARS-CoV-2 testing.  Pt states she was exposed to a neighbor who vomiting. Neurological: Negative for weakness. Positive for stated complaint: Covid-19 Test  Other systems are as noted in HPI. Constitutional and vital signs reviewed. All other systems reviewed and negative except as noted above.     Physica for a visit in 2 days            Medications Prescribed:  Discharge Medication List as of 1/17/2021 12:14 PM

## 2021-01-25 ENCOUNTER — TELEPHONE (OUTPATIENT)
Dept: OBGYN CLINIC | Facility: CLINIC | Age: 46
End: 2021-01-25

## 2021-01-25 NOTE — TELEPHONE ENCOUNTER
Patient is wondering if she can be seen for her annual with Dr Patricia Dobbins on 1/28 during any 20 minute opening he may have. She has been rescheduled twice and would like to be seen as soon as possible. Please call.

## 2021-01-25 NOTE — TELEPHONE ENCOUNTER
LMTCB     PSR- When pt calls back please assist pt with scheduling appt (20 min) for 1/28/2021 in [facility]. May use 2 back to back OB slots per NARENDRA. Thank you!

## 2021-01-27 ENCOUNTER — TELEPHONE (OUTPATIENT)
Dept: OBGYN CLINIC | Facility: CLINIC | Age: 46
End: 2021-01-27

## 2021-01-27 NOTE — TELEPHONE ENCOUNTER
Pt has annual tomorrow with NARENDRA but just started her period. She is due this year for a pap. Last was 2018. Assisted pt in r/s annual with NARENDRA to 2/10. Pt appreciative.

## 2021-02-03 ENCOUNTER — HOSPITAL ENCOUNTER (OUTPATIENT)
Age: 46
Discharge: HOME | End: 2021-02-03
Payer: COMMERCIAL

## 2021-02-03 VITALS
OXYGEN SATURATION: 98 % | RESPIRATION RATE: 18 BRPM | TEMPERATURE: 98 F | DIASTOLIC BLOOD PRESSURE: 68 MMHG | SYSTOLIC BLOOD PRESSURE: 108 MMHG | HEART RATE: 67 BPM

## 2021-02-03 DIAGNOSIS — Z20.822 ENCOUNTER FOR LABORATORY TESTING FOR COVID-19 VIRUS: Primary | ICD-10-CM

## 2021-02-03 DIAGNOSIS — R09.81 SINUS CONGESTION: ICD-10-CM

## 2021-02-03 LAB — SARS-COV-2 RNA RESP QL NAA+PROBE: NOT DETECTED

## 2021-02-03 PROCEDURE — 99212 OFFICE O/P EST SF 10 MIN: CPT

## 2021-02-03 PROCEDURE — 99213 OFFICE O/P EST LOW 20 MIN: CPT

## 2021-02-03 NOTE — ED INITIAL ASSESSMENT (HPI)
+ covid exposure. Patient with nasal congestion. No fever. Several family members with similar symptoms.

## 2021-02-03 NOTE — ED PROVIDER NOTES
Patient Seen in: [facility]      History   Patient presents with:  Nasal Congestion    Stated Complaint: covid test    HPI/Subjective:   Colby Barrett is a 39year old  female here for Covid testing after Covid exposure.   Patient has sympto Tobacco comment: No household smokers. Alcohol use: Yes      Alcohol/week: 4.0 standard drinks      Types: 4 Glasses of wine per week      Comment: Social     Drug use:  No             Review of Systems    Positive for stated complaint: covid test  Othe 2 seconds. Findings: No lesion or rash. Neurological:      General: No focal deficit present. Mental Status: She is alert and oriented to person, place, and time. GCS: GCS eye subscore is 4. GCS verbal subscore is 5.  GCS motor subscore is

## 2021-02-10 ENCOUNTER — OFFICE VISIT (OUTPATIENT)
Dept: OBGYN CLINIC | Facility: CLINIC | Age: 46
End: 2021-02-10
Payer: COMMERCIAL

## 2021-02-10 VITALS
SYSTOLIC BLOOD PRESSURE: 110 MMHG | WEIGHT: 196 LBS | BODY MASS INDEX: 30.4 KG/M2 | HEIGHT: 67.5 IN | HEART RATE: 56 BPM | DIASTOLIC BLOOD PRESSURE: 78 MMHG

## 2021-02-10 DIAGNOSIS — Z01.419 ENCOUNTER FOR GYNECOLOGICAL EXAMINATION WITHOUT ABNORMAL FINDING: Primary | ICD-10-CM

## 2021-02-10 DIAGNOSIS — Z12.31 SCREENING MAMMOGRAM, ENCOUNTER FOR: ICD-10-CM

## 2021-02-10 PROCEDURE — 99396 PREV VISIT EST AGE 40-64: CPT | Performed by: OBSTETRICS & GYNECOLOGY

## 2021-02-10 PROCEDURE — 3074F SYST BP LT 130 MM HG: CPT | Performed by: OBSTETRICS & GYNECOLOGY

## 2021-02-10 PROCEDURE — 3008F BODY MASS INDEX DOCD: CPT | Performed by: OBSTETRICS & GYNECOLOGY

## 2021-02-10 PROCEDURE — 3078F DIAST BP <80 MM HG: CPT | Performed by: OBSTETRICS & GYNECOLOGY

## 2021-02-22 NOTE — PROGRESS NOTES
HPI:    Patient ID: Zoe Rojas is a 39year old female. HPI  John is G7  with regular menses on Para[employer] for [facility]. IUD placed by Dr Susan Palacios in February 2018. Birda Melena are 5 y/o.      Review of Systems   Constitutional: Negative for appetite gage heard.  Pulmonary/Chest: Effort normal and breath sounds normal. She has no wheezes. She has no rales. No breast discharge. Abdominal: Soft. She exhibits no distension and no mass. There is no abdominal tenderness.  There is no rebound and no guardin

## 2021-03-02 RX ORDER — OMEPRAZOLE 40 MG/1
CAPSULE, DELAYED RELEASE ORAL
Qty: 90 CAPSULE | Refills: 3 | Status: SHIPPED | OUTPATIENT
Start: 2021-03-02

## 2021-03-16 ENCOUNTER — TELEPHONE (OUTPATIENT)
Dept: INTERNAL MEDICINE CLINIC | Facility: CLINIC | Age: 46
End: 2021-03-16

## 2021-03-16 RX ORDER — ALPRAZOLAM 2 MG/1
TABLET ORAL
Qty: 30 TABLET | Refills: 0 | Status: SHIPPED | OUTPATIENT
Start: 2021-03-16

## 2021-03-16 NOTE — TELEPHONE ENCOUNTER
To MD:  The above refill request is for a controlled substance. Please review pended medication order. Print and sign for staff to fax to pharmacy or prescribe electronically.     Last office visit: 11/2/2020  Last time refill sent and quantity/refills:

## 2021-04-01 ENCOUNTER — HOSPITAL ENCOUNTER (OUTPATIENT)
Age: 46
Discharge: HOME | End: 2021-04-01
Attending: EMERGENCY MEDICINE
Payer: COMMERCIAL

## 2021-04-01 VITALS
HEART RATE: 62 BPM | TEMPERATURE: 98 F | RESPIRATION RATE: 18 BRPM | SYSTOLIC BLOOD PRESSURE: 110 MMHG | OXYGEN SATURATION: 97 % | DIASTOLIC BLOOD PRESSURE: 80 MMHG

## 2021-04-01 DIAGNOSIS — Z11.52 ENCOUNTER FOR SCREENING FOR COVID-19: Primary | ICD-10-CM

## 2021-04-01 PROCEDURE — 99212 OFFICE O/P EST SF 10 MIN: CPT

## 2021-04-01 NOTE — ED INITIAL ASSESSMENT (HPI)
Here for testing, recent travel to [place], has had dry throat for 2 days, now resolved, no fever, fully vaccinated

## 2021-07-21 RX ORDER — BECLOMETHASONE DIPROPIONATE 80 UG/1
AEROSOL, METERED NASAL
Qty: 31.8 G | Refills: 0 | Status: SHIPPED | OUTPATIENT
Start: 2021-07-21 | End: 2021-12-02

## 2021-07-21 NOTE — TELEPHONE ENCOUNTER
Refill requested for:   Name from pharmacy: [facility]         Will file in chart as: QNASL 80 MCG/ACT Nasal Aero Soln    Sig: USE 2 SPRAYS IN EACH NOSTRIL DAILY    Disp:  31.8 g    Refills:  0 (Pharmacy requested: Not specified)

## 2021-07-22 NOTE — TELEPHONE ENCOUNTER
RN called pt to notify her of RX refill and need for follow up. Pt reports she will call in to schedule.

## 2021-08-01 ENCOUNTER — HOSPITAL ENCOUNTER (OUTPATIENT)
Age: 46
Discharge: HOME | End: 2021-08-01
Payer: COMMERCIAL

## 2021-08-01 VITALS
DIASTOLIC BLOOD PRESSURE: 84 MMHG | SYSTOLIC BLOOD PRESSURE: 124 MMHG | RESPIRATION RATE: 18 BRPM | TEMPERATURE: 98 F | OXYGEN SATURATION: 97 % | HEART RATE: 60 BPM

## 2021-08-01 DIAGNOSIS — J02.0 STREP THROAT: Primary | ICD-10-CM

## 2021-08-01 LAB — S PYO AG THROAT QL: POSITIVE

## 2021-08-01 PROCEDURE — 99213 OFFICE O/P EST LOW 20 MIN: CPT

## 2021-08-01 PROCEDURE — 87880 STREP A ASSAY W/OPTIC: CPT

## 2021-08-01 RX ORDER — AMOXICILLIN 500 MG/1
500 TABLET, FILM COATED ORAL 2 TIMES DAILY
Qty: 20 TABLET | Refills: 0 | Status: SHIPPED | OUTPATIENT
Start: 2021-08-01 | End: 2021-08-11

## 2021-08-01 NOTE — ED INITIAL ASSESSMENT (HPI)
PATIENT ARRIVED AMBULATORY TO ROOM C/O SYMPTOMS THAT STARTED 3 DAYS AGO. +SORE THROAT. +COUGH. +NASAL CONGESTION. EASY NON LABORED RESPIRATIONS.

## 2021-08-02 LAB — SARS-COV-2 RNA RESP QL NAA+PROBE: NOT DETECTED

## 2021-08-02 NOTE — ED PROVIDER NOTES
Patient Seen in: [facility]    History   Patient presents with:  Sore Throat: Entered by patient    Stated Complaint: Covid-19 Test    HPI    Patient here with sore throat, congestion, and minor cough for 3 days. Nabil.   Patient denies sig sh • Cancer Paternal Grandmother    • Breast Cancer Paternal Grandmother         25s, lived til age 80   • Cancer Self 34        non hodgkins lymphoma        Social History    Tobacco Use      Smoking status: Former Smoker        Types: Cigarettes        St or tachypnea. Extremities: No edema. Pulses 2+ extremities. Skin: No rash noted. No ecchymosis. CNS: Moves all 4 extremities. Interacts appropriately.       DDX: strep vs. Viral pharyngitis vs. uri    ED Course     Labs Reviewed   POCT 8/1/2021 11:02 AM    START taking these medications    amoxicillin 500 MG Oral Tab  Take 1 tablet (500 mg total) by mouth 2 (two) times daily for 10 days. , Normal, Disp-20 tablet, R-0

## 2021-09-07 ENCOUNTER — PATIENT MESSAGE (OUTPATIENT)
Dept: OBGYN CLINIC | Facility: CLINIC | Age: 46
End: 2021-09-07

## 2021-09-07 NOTE — TELEPHONE ENCOUNTER
From: Glory Mcdaniels  To: Tsering Salazar. DO Ayesha  Sent: 9/7/2021 10:50 AM CDT  Subject: Visit Follow-up Question    [facility],  In your response you wrote \"if you have a normal 28 day cycle\" but this time my cycle was 27 days.  That doesn't usually happen

## 2021-09-07 NOTE — TELEPHONE ENCOUNTER
From: Glory Mcdaniels  To: Tsering Salazar. Cheryl Lassiter, DO  Sent: 9/7/2021 9:09 AM CDT  Subject: Ember Enriquez,  Thank you for your voicemail.  I scheduled the MRI on 9/13 he first day of my period (when I was scheduling) was 8/1, so if I w

## 2021-09-27 ENCOUNTER — HOSPITAL ENCOUNTER (OUTPATIENT)
Dept: MAMMOGRAPHY | Age: 46
Discharge: HOME | End: 2021-09-27
Attending: OBSTETRICS & GYNECOLOGY
Payer: COMMERCIAL

## 2021-09-27 DIAGNOSIS — Z12.31 SCREENING MAMMOGRAM, ENCOUNTER FOR: ICD-10-CM

## 2021-09-27 PROCEDURE — 77067 SCR MAMMO BI INCL CAD: CPT | Performed by: OBSTETRICS & GYNECOLOGY

## 2021-09-27 PROCEDURE — 77063 BREAST TOMOSYNTHESIS BI: CPT | Performed by: OBSTETRICS & GYNECOLOGY

## 2021-10-11 ENCOUNTER — APPOINTMENT (OUTPATIENT)
Dept: URBAN - METROPOLITAN AREA CLINIC 321 | Age: 46
Setting detail: DERMATOLOGY
End: 2021-10-11

## 2021-10-11 DIAGNOSIS — L82.1 OTHER SEBORRHEIC KERATOSIS: ICD-10-CM

## 2021-10-11 DIAGNOSIS — D22 MELANOCYTIC NEVI: ICD-10-CM

## 2021-10-11 DIAGNOSIS — L81.4 OTHER MELANIN HYPERPIGMENTATION: ICD-10-CM

## 2021-10-11 PROBLEM — D48.5 NEOPLASM OF UNCERTAIN BEHAVIOR OF SKIN: Status: ACTIVE | Noted: 2021-10-11

## 2021-10-11 PROBLEM — D22.5 MELANOCYTIC NEVI OF TRUNK: Status: ACTIVE | Noted: 2021-10-11

## 2021-10-11 PROCEDURE — 11301 SHAVE SKIN LESION 0.6-1.0 CM: CPT

## 2021-10-11 PROCEDURE — OTHER SHAVE REMOVAL: OTHER

## 2021-10-11 PROCEDURE — 99213 OFFICE O/P EST LOW 20 MIN: CPT | Mod: 25

## 2021-10-11 PROCEDURE — 11301 SHAVE SKIN LESION 0.6-1.0 CM: CPT | Mod: 76

## 2021-10-11 PROCEDURE — OTHER COUNSELING: OTHER

## 2021-10-11 ASSESSMENT — LOCATION DETAILED DESCRIPTION DERM
LOCATION DETAILED: RIGHT SUPERIOR MEDIAL UPPER BACK
LOCATION DETAILED: RIGHT ANTERIOR MEDIAL PROXIMAL THIGH
LOCATION DETAILED: LEFT MEDIAL UPPER BACK
LOCATION DETAILED: UPPER STERNUM
LOCATION DETAILED: LEFT BUTTOCK

## 2021-10-11 ASSESSMENT — LOCATION SIMPLE DESCRIPTION DERM
LOCATION SIMPLE: RIGHT THIGH
LOCATION SIMPLE: RIGHT UPPER BACK
LOCATION SIMPLE: LEFT BUTTOCK
LOCATION SIMPLE: LEFT UPPER BACK
LOCATION SIMPLE: CHEST

## 2021-10-11 ASSESSMENT — LOCATION ZONE DERM
LOCATION ZONE: LEG
LOCATION ZONE: TRUNK

## 2021-10-11 NOTE — PROCEDURE: SHAVE REMOVAL
Bill 64291 For Specimen Handling/Conveyance To Laboratory?: no
Wound Care: Petrolatum
Medical Necessity Clause: This procedure was medically necessary because the lesion that was treated was:
Size Of Lesion In Cm (Required): 0.9
Medical Necessity Information: It is in your best interest to select a reason for this procedure from the list below. All of these items fulfill various CMS LCD requirements except the new and changing color options.
Post-Care Instructions: I reviewed with the patient in detail post-care instructions. Patient is to keep the biopsy site dry overnight, and then apply petrolatum twice daily until healed or after one or two night patient may leave area open and dry and a scab will form which will eventually fall off in a few weeks without further care other than cleaning twice a day.
X Size Of Lesion In Cm (Optional): 0
Notification Instructions: Patient will be notified of pathology results. However, patient instructed to call the office if not contacted within 2 weeks.
Detail Level: Detailed
Billing Type: Third-Party Bill
Detail Level: Simple
Anesthesia Type: 1% lidocaine with 1:100,000 epinephrine and a 1:10 solution of 8.4% sodium bicarbonate
Consent was obtained from the patient. The risks and benefits to therapy were discussed in detail. Specifically, the risks of infection, scarring, bleeding, prolonged wound healing, incomplete removal, allergy to anesthesia, nerve injury and recurrence were addressed.
Was A Bandage Applied: Yes
Biopsy Method: Dermablade
Hemostasis: Drysol

## 2021-12-02 ENCOUNTER — OFFICE VISIT (OUTPATIENT)
Dept: OTOLARYNGOLOGY | Facility: CLINIC | Age: 46
End: 2021-12-02
Payer: COMMERCIAL

## 2021-12-02 VITALS — BODY MASS INDEX: 30.76 KG/M2 | WEIGHT: 196 LBS | HEIGHT: 67 IN

## 2021-12-02 DIAGNOSIS — R04.0 EPISTAXIS: Primary | ICD-10-CM

## 2021-12-02 PROCEDURE — 30901 CONTROL OF NOSEBLEED: CPT | Performed by: OTOLARYNGOLOGY

## 2021-12-02 PROCEDURE — 3008F BODY MASS INDEX DOCD: CPT | Performed by: OTOLARYNGOLOGY

## 2021-12-02 PROCEDURE — 99203 OFFICE O/P NEW LOW 30 MIN: CPT | Performed by: OTOLARYNGOLOGY

## 2021-12-02 RX ORDER — BECLOMETHASONE DIPROPIONATE 80 UG/1
2 AEROSOL, METERED NASAL DAILY
Qty: 1 EACH | Refills: 0 | Status: SHIPPED | OUTPATIENT
Start: 2021-12-02 | End: 2021-12-13

## 2021-12-02 RX ORDER — BUPROPION HYDROCHLORIDE 150 MG/1
150 TABLET ORAL DAILY
COMMUNITY
Start: 2021-10-07

## 2021-12-02 RX ORDER — ESCITALOPRAM OXALATE 10 MG/1
10 TABLET ORAL DAILY
COMMUNITY
Start: 2021-10-07 | End: 2021-12-20

## 2021-12-02 NOTE — TELEPHONE ENCOUNTER
Patient last seen in Allergy 10/22/2020 for . . . Other chronic sinusitis  (primary encounter diagnosis)  Environmental and seasonal allergies  Flu vaccine need     Refill request received for . . .     QNASL 80 MCG/ACT Nasal Aero Soln 31.8 g 0 7/21/2021

## 2021-12-02 NOTE — PROGRESS NOTES
Lianne Kitchen is a 42year old female. Patient presents with:  Nose Problem: pt states about 3 weeks ago her son hit her nose and it started bleeding and since she has been having sinus pressure.       HISTORY OF PRESENT ILLNESS  12/2/2021  Patient Griffin Garcia file  Physical Activity: Not on file  Stress: Not on file  Social Connections: Not on file  Intimate Partner Violence: Not on file  Housing Stability: Not on file    Family History   Problem Relation Age of Onset   • Hypertension Brother    • Diabetes Mate to time, place, person & situation. Appropriate mood and affect. Neck Exam Normal Inspection - Normal. Palpation - Normal without lymphadenopathy.  Parotid gland - Normal. Thyroid gland - Normal.   Eyes Normal Conjunctiva - Right: Normal, Left: Normal. Pu Disp: 30 tablet, Rfl: 11  •  Liothyronine Sodium 5 MCG Oral Tab, Take 5 mcg by mouth daily. , Disp: , Rfl:   •  Meloxicam 15 MG Oral Tab, Take 1 tablet (15 mg total) by mouth daily. , Disp: 30 tablet, Rfl: 0  ASSESSMENT AND PLAN    1. Epistaxis     - CTRL NO

## 2021-12-02 NOTE — TELEPHONE ENCOUNTER
[employer] message sent to patient regarding refill approval for nasal spray, Qnasl and to please contact our office to schedule an office visit per Dr. Robinson Argueta.

## 2021-12-02 NOTE — TELEPHONE ENCOUNTER
Med refill x1 month. Patient last seen in October 2020 and overdue for yearly follow-up.   Please call to schedule

## 2021-12-13 ENCOUNTER — OFFICE VISIT (OUTPATIENT)
Dept: ALLERGY | Facility: CLINIC | Age: 46
End: 2021-12-13
Payer: COMMERCIAL

## 2021-12-13 VITALS
DIASTOLIC BLOOD PRESSURE: 80 MMHG | SYSTOLIC BLOOD PRESSURE: 114 MMHG | HEART RATE: 61 BPM | RESPIRATION RATE: 18 BRPM | OXYGEN SATURATION: 96 %

## 2021-12-13 DIAGNOSIS — J30.89 ENVIRONMENTAL AND SEASONAL ALLERGIES: ICD-10-CM

## 2021-12-13 DIAGNOSIS — J32.8 OTHER CHRONIC SINUSITIS: Primary | ICD-10-CM

## 2021-12-13 DIAGNOSIS — Z23 FLU VACCINE NEED: ICD-10-CM

## 2021-12-13 DIAGNOSIS — Z92.29 COVID-19 VACCINE SERIES COMPLETED: ICD-10-CM

## 2021-12-13 PROCEDURE — 3079F DIAST BP 80-89 MM HG: CPT | Performed by: ALLERGY & IMMUNOLOGY

## 2021-12-13 PROCEDURE — 90686 IIV4 VACC NO PRSV 0.5 ML IM: CPT | Performed by: ALLERGY & IMMUNOLOGY

## 2021-12-13 PROCEDURE — 90471 IMMUNIZATION ADMIN: CPT | Performed by: ALLERGY & IMMUNOLOGY

## 2021-12-13 PROCEDURE — 3074F SYST BP LT 130 MM HG: CPT | Performed by: ALLERGY & IMMUNOLOGY

## 2021-12-13 PROCEDURE — 99214 OFFICE O/P EST MOD 30 MIN: CPT | Performed by: ALLERGY & IMMUNOLOGY

## 2021-12-13 RX ORDER — BECLOMETHASONE DIPROPIONATE 80 UG/1
2 AEROSOL, METERED NASAL DAILY
Qty: 3 EACH | Refills: 2 | Status: SHIPPED | OUTPATIENT
Start: 2021-12-13

## 2021-12-14 NOTE — PROGRESS NOTES
Charlie Flores is a 42year old female. HPI:   Patient presents with:  Sinus Problem: Patient reports that her allergies are well managed. Here for routine follow up. Patient is a 42 patient last seen by me in October 2020.   Patient has a history o allergies       Past Surgical History:   Procedure Laterality Date   • EXCISION TURBINATE,SUBMUCOUS     • NASAL SCOPY,REMV PART ETHMOID     • NASAL SCOPY,RMV TISS MAXILL SINUS     • OTHER Left 2005    excision groin lymph node   • REDUCTION LEFT  2000   • Allergies:    Sulfa Antibiotics       HIVES      ROS:   Allergic/Immuno:  See hpi  Cardiovascular:  Negative for irregular heartbeat/palpitations, chest pain, edema  Constitutional:  Negative night sweats,weight loss, irritability and lethargy  ENM with Qnasl and Xyzal  Sinus rinses as needed    3. Covid vaccines up-to-date x3 doses query sent to [facility] [address]    4.   Flu vaccine given in office today    Follow-up in 1 year or sooner if needed         Orders This Visit:  Orders

## 2021-12-20 ENCOUNTER — OFFICE VISIT (OUTPATIENT)
Dept: INTERNAL MEDICINE CLINIC | Facility: CLINIC | Age: 46
End: 2021-12-20
Payer: COMMERCIAL

## 2021-12-20 ENCOUNTER — PATIENT MESSAGE (OUTPATIENT)
Dept: INTERNAL MEDICINE CLINIC | Facility: CLINIC | Age: 46
End: 2021-12-20

## 2021-12-20 VITALS
BODY MASS INDEX: 31.07 KG/M2 | RESPIRATION RATE: 16 BRPM | SYSTOLIC BLOOD PRESSURE: 118 MMHG | WEIGHT: 205 LBS | HEART RATE: 68 BPM | HEIGHT: 68 IN | DIASTOLIC BLOOD PRESSURE: 74 MMHG

## 2021-12-20 DIAGNOSIS — F32.A DEPRESSION, UNSPECIFIED DEPRESSION TYPE: ICD-10-CM

## 2021-12-20 DIAGNOSIS — Z51.81 THERAPEUTIC DRUG MONITORING: Primary | ICD-10-CM

## 2021-12-20 DIAGNOSIS — E03.9 HYPOTHYROIDISM, UNSPECIFIED TYPE: ICD-10-CM

## 2021-12-20 DIAGNOSIS — R63.2 BINGE EATING: ICD-10-CM

## 2021-12-20 DIAGNOSIS — E55.9 VITAMIN D DEFICIENCY: ICD-10-CM

## 2021-12-20 DIAGNOSIS — Z85.72 HISTORY OF NON-HODGKIN'S LYMPHOMA: ICD-10-CM

## 2021-12-20 DIAGNOSIS — E66.9 OBESITY WITH BODY MASS INDEX (BMI) OF 30.0 TO 39.9: ICD-10-CM

## 2021-12-20 PROBLEM — G43.009 MIGRAINE WITHOUT AURA: Status: ACTIVE | Noted: 2021-12-20

## 2021-12-20 PROBLEM — R10.32 LLQ PAIN: Status: ACTIVE | Noted: 2021-05-04

## 2021-12-20 PROBLEM — R63.5 ABNORMAL WEIGHT GAIN: Status: ACTIVE | Noted: 2018-01-29

## 2021-12-20 PROBLEM — R91.8 PULMONARY INFILTRATE IN RIGHT LUNG ON CHEST X-RAY: Status: ACTIVE | Noted: 2021-12-20

## 2021-12-20 PROBLEM — K80.20 CHOLELITHIASIS: Status: ACTIVE | Noted: 2021-12-20

## 2021-12-20 PROBLEM — F41.9 ANXIETY: Status: ACTIVE | Noted: 2017-11-14

## 2021-12-20 PROCEDURE — 3008F BODY MASS INDEX DOCD: CPT | Performed by: NURSE PRACTITIONER

## 2021-12-20 PROCEDURE — 99204 OFFICE O/P NEW MOD 45 MIN: CPT | Performed by: NURSE PRACTITIONER

## 2021-12-20 PROCEDURE — 3074F SYST BP LT 130 MM HG: CPT | Performed by: NURSE PRACTITIONER

## 2021-12-20 PROCEDURE — 3078F DIAST BP <80 MM HG: CPT | Performed by: NURSE PRACTITIONER

## 2021-12-20 PROCEDURE — 93000 ELECTROCARDIOGRAM COMPLETE: CPT | Performed by: NURSE PRACTITIONER

## 2021-12-20 NOTE — PROGRESS NOTES
HISTORY OF PRESENT ILLNESS  Patient presents with:  Weight Problem: referred by friend, tried phentermine 15 years ago    Giovany Romero is a 42year old female new to our office today for initiation of medical weight loss program.  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       ASSESSMENT/PLAN  (Z51.81) Therapeutic drug monitoring  (primary encounter diagnosis)  Plan: VITAMIN D, VITAMIN B12, TSH+FREE T4, LIPID         PANE stricture (see HPI)- seeing specialist at [facility]  · Decrease carbs, increase protein, no skipping meals   · Needs to incorporate exercise regimen FITTE: ACSM recommendations (150-300 minutes/ week in active weight loss)   · Follow up with dietitian and psych [place] balanced breaks (cheese and nuts)- without chocolate  5.  Reduce carbohydrates which includes sweets as well as rice, pasta, potatoes, bread, corn and instead choose whole grain options or more protein or vegetables (4-6 servings of veg grams of carbs. Plum: One medium-sized (80 grams) contains 6 grams of carbs. VEGETABLES  Low carb vegetables              Return in about 4 weeks (around 1/17/2022) for weight management. Patient verbalizes understanding.     JUDIE Hansen

## 2021-12-20 NOTE — PATIENT INSTRUCTIONS
We are here to support you with weight loss, but please remember that you still need your primary care provider for your routine health maintenance.       PLAN:  Will check with psychiatrist if they might be ok with trying vyvanse (for binge eating)   Will With My Fitness Pal-->When you set-up the phill or need to adjust settings:                Goals should include:                  Lose 1.5-2 lbs per week                Activity level: not very active (can't count exercise towards calorie number per day)

## 2021-12-20 NOTE — TELEPHONE ENCOUNTER
From: Rahul Jenkins  To: JUDIE Diaz  Sent: 12/20/2021 12:12 PM CST  Subject: Medication    I just checked with my insurance and the injection unfortunately is not covered. I’m disappointed but they said it’s a medial exclusion.    Thanks,  Steven Matt

## 2021-12-22 ENCOUNTER — LAB ENCOUNTER (OUTPATIENT)
Dept: LAB | Facility: HOSPITAL | Age: 46
End: 2021-12-22
Attending: NURSE PRACTITIONER
Payer: COMMERCIAL

## 2021-12-22 ENCOUNTER — APPOINTMENT (OUTPATIENT)
Dept: LAB | Age: 46
End: 2021-12-22
Attending: NURSE PRACTITIONER
Payer: COMMERCIAL

## 2021-12-22 DIAGNOSIS — E66.9 OBESITY WITH BODY MASS INDEX (BMI) OF 30.0 TO 39.9: ICD-10-CM

## 2021-12-22 DIAGNOSIS — E03.9 HYPOTHYROIDISM, UNSPECIFIED TYPE: ICD-10-CM

## 2021-12-22 DIAGNOSIS — Z51.81 THERAPEUTIC DRUG MONITORING: ICD-10-CM

## 2021-12-22 PROCEDURE — 82607 VITAMIN B-12: CPT

## 2021-12-22 PROCEDURE — 80053 COMPREHEN METABOLIC PANEL: CPT

## 2021-12-22 PROCEDURE — 85025 COMPLETE CBC W/AUTO DIFF WBC: CPT

## 2021-12-22 PROCEDURE — 80061 LIPID PANEL: CPT

## 2021-12-22 PROCEDURE — 36415 COLL VENOUS BLD VENIPUNCTURE: CPT

## 2021-12-22 PROCEDURE — 82306 VITAMIN D 25 HYDROXY: CPT

## 2021-12-22 PROCEDURE — 84443 ASSAY THYROID STIM HORMONE: CPT

## 2021-12-22 PROCEDURE — 84439 ASSAY OF FREE THYROXINE: CPT

## 2021-12-22 PROCEDURE — 83036 HEMOGLOBIN GLYCOSYLATED A1C: CPT

## 2021-12-27 NOTE — PROGRESS NOTES
Vitamin b12 normal   A1c= 5.6% no prediabetes yet, but very close to the start of prediabetes 5.7-6.4%  Mildly low Vitamin d: please start daily maintenance vitamin D 2000 Units    Thyroid:  stable  Cholesterol: (mildly elevated total) recommend exercise,

## 2022-01-19 ENCOUNTER — OFFICE VISIT (OUTPATIENT)
Dept: SURGERY | Facility: CLINIC | Age: 47
End: 2022-01-19
Payer: COMMERCIAL

## 2022-01-19 VITALS
SYSTOLIC BLOOD PRESSURE: 119 MMHG | HEIGHT: 67.7 IN | BODY MASS INDEX: 30.97 KG/M2 | HEART RATE: 83 BPM | WEIGHT: 202 LBS | OXYGEN SATURATION: 97 % | DIASTOLIC BLOOD PRESSURE: 78 MMHG

## 2022-01-19 DIAGNOSIS — Z86.69 HX OF MIGRAINES: ICD-10-CM

## 2022-01-19 DIAGNOSIS — E78.00 HYPERCHOLESTEROLEMIA: ICD-10-CM

## 2022-01-19 DIAGNOSIS — R63.5 WEIGHT GAIN: ICD-10-CM

## 2022-01-19 DIAGNOSIS — K21.9 GASTROESOPHAGEAL REFLUX DISEASE WITHOUT ESOPHAGITIS: ICD-10-CM

## 2022-01-19 DIAGNOSIS — E55.9 VITAMIN D DEFICIENCY: ICD-10-CM

## 2022-01-19 DIAGNOSIS — Z86.69 HISTORY OF MIGRAINE: ICD-10-CM

## 2022-01-19 DIAGNOSIS — F32.A DEPRESSION, UNSPECIFIED DEPRESSION TYPE: ICD-10-CM

## 2022-01-19 DIAGNOSIS — C85.90 NON-HODGKIN'S LYMPHOMA, UNSPECIFIED BODY REGION, UNSPECIFIED NON-HODGKIN LYMPHOMA TYPE (HCC): ICD-10-CM

## 2022-01-19 DIAGNOSIS — Z51.81 ENCOUNTER FOR THERAPEUTIC DRUG MONITORING: Primary | ICD-10-CM

## 2022-01-19 PROCEDURE — 99214 OFFICE O/P EST MOD 30 MIN: CPT | Performed by: NURSE PRACTITIONER

## 2022-01-19 PROCEDURE — 3078F DIAST BP <80 MM HG: CPT | Performed by: NURSE PRACTITIONER

## 2022-01-19 PROCEDURE — 3008F BODY MASS INDEX DOCD: CPT | Performed by: NURSE PRACTITIONER

## 2022-01-19 PROCEDURE — 3074F SYST BP LT 130 MM HG: CPT | Performed by: NURSE PRACTITIONER

## 2022-01-19 RX ORDER — TOPIRAMATE 25 MG/1
25 TABLET ORAL 2 TIMES DAILY
Qty: 60 TABLET | Refills: 2 | Status: SHIPPED | OUTPATIENT
Start: 2022-01-19

## 2022-01-19 NOTE — PROGRESS NOTES
[address]  Dept: [phone]       Patient:  Moy Reese  :      1975  MRN:      [id number]    Chief Complaint:  Patient presents Hr Take 150 mg by mouth daily.      • ALPRAZOLAM 2 MG Oral Tab TAKE 1 TABLET(2 MG) BY MOUTH THREE TIMES DAILY AS NEEDED 30 tablet 0   • OMEPRAZOLE 40 MG Oral Capsule Delayed Release TAKE 1 CAPSULE(40 MG) BY MOUTH DAILY 90 capsule 3     Allergies:  Sulfa Ant • OTHER  2010    ectopic pregnancy    • REDUCTION LEFT  2000   • REDUCTION LEFT  2002   • REDUCTION RIGHT  2000   • REDUCTION RIGHT  2002   • REPAIR OF NASAL SEPTUM       Family History:    Family History   Problem Relation Age of Onset   • Hypertension abdomen  Genitourinary:positive for endometriosis  Integument/breast: negative  Hematologic/lymphatic: positive for Hx lymphoma   Musculoskeletal:negative  Neurological: positive for vestibular migraines  Behavioral/Psych: negative  Endocrine: negative  Al non-Hodgkin [facility]  -     DIETITIAN EDUCATION INITIAL, DIET (INTERNAL)    History of migraine  -     DIETITIAN EDUCATION INITIAL, DIET (INTERNAL)    Hx of migraines  -     DIETITIAN EDUCATION INITIAL, DIET (INTERNAL)    Hypercholesterolemia stones. Hx vestibular migraine h/a. Continue wellbutrin 300 mg/day- newly increased dose. Hold off on Vyvanse for now- patient has concerns about Vyvanse/Wellbutrin interactions.     Start 25 mg topiramate in the evening for cravings and to assist w

## 2022-01-19 NOTE — PATIENT INSTRUCTIONS
Green   Factor     1. Eat plenty of healthful foods from plant sources (vegetables, fruits, whole grains, and legumes).  These foods promote a healthy weight, stabilize hormones, and can replace animal products ([employer]ible [employer]

## 2022-01-19 NOTE — PROGRESS NOTES
[facility] Consultation Note       Date of Consult:  2022    Patient:  Woo Baron  :      1975  MRN:      [id number]    Referring Provider: Dr. Perry ref.  provider found       Chief Complaint:  Patient presents with:  Co Beclomethasone Dipropionate (QNASL) 80 MCG/ACT Nasal Aero Soln 2 sprays by Nasal route daily. 3 each 2   • buPROPion 150 MG Oral Tablet 24 Hr Take 150 mg by mouth daily.      • ALPRAZOLAM 2 MG Oral Tab TAKE 1 TABLET(2 MG) BY MOUTH THREE TIMES DAILY AS NEEDE History:   Procedure Laterality Date   • EXCISION TURBINATE,SUBMUCOUS     • NASAL SCOPY,REMV PART ETHMOID     • NASAL SCOPY,RMV TISS MAXILL SINUS     • OTHER Left 2005    excision groin lymph node   • REDUCTION LEFT  2000   • REDUCTION LEFT  2002   • Hamida Drummond gastrointestinal:11724::\"negative\"}  Integument/breast: {Findings; ROS skin/breast:72849::\"negative\"}  Hematologic/lymphatic: {Findings; ROS hematologic/lymphatic:59570::\"negative\"}  Musculoskeletal:{Findings; ROS musculoskeletal:37969::\"negative\"} soda.  3. Aim for 150 minutes moderate exercise per week. 4. Increase fruit and vegetable servings to 5-6 per day. 5. Improve sleep and stress. Reviewed labs:    Discussed medication options for weight loss in detail with patient.      Start med

## 2022-02-26 ENCOUNTER — HOSPITAL ENCOUNTER (OUTPATIENT)
Age: 47
Discharge: HOME | End: 2022-02-26
Payer: COMMERCIAL

## 2022-02-26 VITALS
TEMPERATURE: 98 F | DIASTOLIC BLOOD PRESSURE: 71 MMHG | SYSTOLIC BLOOD PRESSURE: 153 MMHG | RESPIRATION RATE: 20 BRPM | HEART RATE: 74 BPM | OXYGEN SATURATION: 100 %

## 2022-02-26 DIAGNOSIS — J01.90 ACUTE SINUSITIS, RECURRENCE NOT SPECIFIED, UNSPECIFIED LOCATION: Primary | ICD-10-CM

## 2022-02-26 LAB — SARS-COV-2 RNA RESP QL NAA+PROBE: NOT DETECTED

## 2022-02-26 PROCEDURE — 99213 OFFICE O/P EST LOW 20 MIN: CPT

## 2022-02-26 RX ORDER — AMOXICILLIN AND CLAVULANATE POTASSIUM 875; 125 MG/1; MG/1
1 TABLET, FILM COATED ORAL 2 TIMES DAILY
Qty: 20 TABLET | Refills: 0 | Status: SHIPPED | OUTPATIENT
Start: 2022-02-26 | End: 2022-03-08

## 2022-02-26 NOTE — ED INITIAL ASSESSMENT (HPI)
PATIENT ARRIVED AMBULATORY TO ROOM C/O SYMPTOMS THAT STARTED LAST NIGHT. +FEVER . +NASAL CONGESTION +BODY ACHES. NO SORE THROAT. NO N/V/D. EASY NON LABORED RESPIRATIONS.

## 2022-03-09 ENCOUNTER — OFFICE VISIT (OUTPATIENT)
Dept: INTERNAL MEDICINE CLINIC | Facility: CLINIC | Age: 47
End: 2022-03-09
Payer: COMMERCIAL

## 2022-03-09 VITALS
SYSTOLIC BLOOD PRESSURE: 122 MMHG | HEART RATE: 84 BPM | RESPIRATION RATE: 16 BRPM | DIASTOLIC BLOOD PRESSURE: 84 MMHG | HEIGHT: 68 IN | OXYGEN SATURATION: 99 % | WEIGHT: 201.38 LBS | BODY MASS INDEX: 30.52 KG/M2 | TEMPERATURE: 98 F

## 2022-03-09 DIAGNOSIS — K56.699 COLONIC STRICTURE (HCC): ICD-10-CM

## 2022-03-09 DIAGNOSIS — Z00.00 PHYSICAL EXAM: Primary | ICD-10-CM

## 2022-03-09 DIAGNOSIS — Z85.72 HISTORY OF NON-HODGKIN'S LYMPHOMA: ICD-10-CM

## 2022-03-09 DIAGNOSIS — F32.A DEPRESSION, UNSPECIFIED DEPRESSION TYPE: ICD-10-CM

## 2022-03-09 DIAGNOSIS — K21.9 GASTROESOPHAGEAL REFLUX DISEASE, UNSPECIFIED WHETHER ESOPHAGITIS PRESENT: ICD-10-CM

## 2022-03-09 DIAGNOSIS — F41.9 ANXIETY: ICD-10-CM

## 2022-03-09 DIAGNOSIS — J32.9 CHRONIC SINUSITIS, UNSPECIFIED LOCATION: ICD-10-CM

## 2022-03-09 PROCEDURE — 99396 PREV VISIT EST AGE 40-64: CPT | Performed by: INTERNAL MEDICINE

## 2022-03-09 PROCEDURE — 3074F SYST BP LT 130 MM HG: CPT | Performed by: INTERNAL MEDICINE

## 2022-03-09 PROCEDURE — 3008F BODY MASS INDEX DOCD: CPT | Performed by: INTERNAL MEDICINE

## 2022-03-09 PROCEDURE — 3079F DIAST BP 80-89 MM HG: CPT | Performed by: INTERNAL MEDICINE

## 2022-03-09 RX ORDER — BUSPIRONE HYDROCHLORIDE 10 MG/1
10 TABLET ORAL 2 TIMES DAILY
COMMUNITY
Start: 2022-02-18

## 2022-03-09 RX ORDER — ALPRAZOLAM 2 MG/1
2 TABLET ORAL 3 TIMES DAILY PRN
Qty: 30 TABLET | Refills: 0 | Status: SHIPPED | OUTPATIENT
Start: 2022-03-09

## 2022-03-09 RX ORDER — LEVOCETIRIZINE DIHYDROCHLORIDE 2.5 MG/5ML
2.5 SOLUTION ORAL EVERY EVENING
COMMUNITY

## 2022-04-25 ENCOUNTER — PATIENT MESSAGE (OUTPATIENT)
Dept: INTERNAL MEDICINE CLINIC | Facility: CLINIC | Age: 47
End: 2022-04-25

## 2022-04-25 NOTE — TELEPHONE ENCOUNTER
From: Daksha Smith  To: Stacy Delacruz MD  Sent: 4/25/2022 8:30 AM CDT  Subject: Medication Question    Hi Dr. Miladis Catherine,  During our last visit we discussed the use of Xanax to \"spot treat\" anxiety. I am currently taking Busborne and it is helping the majority of the time. The 2mg prescription of xanax really helped for flying but I was wondering if I could get a .25 prescription? My mom is currently going through chemo for Bladder cancer and it has just been a alonso time. I can also go through my psychiatrist if that makes more sense but I thoguth I would start with you. The 2mg are harder to break up and if it is something I am taking during the day, the .25 is better to start with. Thanks.   Josie Hall

## 2022-04-27 RX ORDER — ALPRAZOLAM 0.25 MG/1
0.25 TABLET ORAL 2 TIMES DAILY PRN
Qty: 30 TABLET | Refills: 0 | Status: SHIPPED | OUTPATIENT
Start: 2022-04-27

## 2022-05-09 ENCOUNTER — TELEPHONE (OUTPATIENT)
Dept: INTERNAL MEDICINE CLINIC | Facility: CLINIC | Age: 47
End: 2022-05-09

## 2022-05-09 NOTE — TELEPHONE ENCOUNTER
Left message on patient's cell (OK per HIPAA) requesting she call back for nursing to triage symptoms prior to appointment tomorrow.

## 2022-05-09 NOTE — TELEPHONE ENCOUNTER
Pt scheduled mychart appt for tomorrow 5/10/22 for the following:    Cough, plugged up ears, diarrhea    Ok for patient to be seen in the office?     Tasked to nursing

## 2022-05-10 ENCOUNTER — OFFICE VISIT (OUTPATIENT)
Dept: INTERNAL MEDICINE CLINIC | Facility: CLINIC | Age: 47
End: 2022-05-10
Payer: COMMERCIAL

## 2022-05-10 VITALS
SYSTOLIC BLOOD PRESSURE: 122 MMHG | DIASTOLIC BLOOD PRESSURE: 82 MMHG | HEIGHT: 68 IN | BODY MASS INDEX: 27.58 KG/M2 | WEIGHT: 182 LBS | HEART RATE: 72 BPM | OXYGEN SATURATION: 100 %

## 2022-05-10 DIAGNOSIS — R30.0 DYSURIA: Primary | ICD-10-CM

## 2022-05-10 DIAGNOSIS — J32.9 CHRONIC SINUSITIS, UNSPECIFIED LOCATION: ICD-10-CM

## 2022-05-10 DIAGNOSIS — Z85.72 HISTORY OF NON-HODGKIN'S LYMPHOMA: ICD-10-CM

## 2022-05-10 LAB
BILIRUB UR QL: NEGATIVE
COLOR UR: YELLOW
GLUCOSE (URINE DIPSTICK): NEGATIVE MG/DL
GLUCOSE UR-MCNC: NEGATIVE MG/DL
HGB UR QL STRIP.AUTO: NEGATIVE
KETONES UR-MCNC: NEGATIVE MG/DL
MULTISTIX LOT#: ABNORMAL NUMERIC
NITRITE UR QL STRIP.AUTO: NEGATIVE
NITRITE, URINE: NEGATIVE
OCCULT BLOOD: NEGATIVE
PH UR: 8 [PH] (ref 5–8)
PH, URINE: 5 (ref 4.5–8)
PROT UR-MCNC: NEGATIVE MG/DL
PROTEIN (URINE DIPSTICK): NEGATIVE MG/DL
SP GR UR STRIP: 1.02 (ref 1–1.03)
SPECIFIC GRAVITY: 1.01 (ref 1–1.03)
UROBILINOGEN UR STRIP-ACNC: <2
UROBILINOGEN,SEMI-QN: 0.2 MG/DL (ref 0–1.9)
VIT C UR-MCNC: NEGATIVE MG/DL

## 2022-05-10 PROCEDURE — 81002 URINALYSIS NONAUTO W/O SCOPE: CPT | Performed by: INTERNAL MEDICINE

## 2022-05-10 PROCEDURE — 99213 OFFICE O/P EST LOW 20 MIN: CPT | Performed by: INTERNAL MEDICINE

## 2022-05-10 PROCEDURE — 3074F SYST BP LT 130 MM HG: CPT | Performed by: INTERNAL MEDICINE

## 2022-05-10 PROCEDURE — 3008F BODY MASS INDEX DOCD: CPT | Performed by: INTERNAL MEDICINE

## 2022-05-10 PROCEDURE — 3079F DIAST BP 80-89 MM HG: CPT | Performed by: INTERNAL MEDICINE

## 2022-05-10 RX ORDER — LEVOFLOXACIN 500 MG/1
500 TABLET, FILM COATED ORAL DAILY
Qty: 10 TABLET | Refills: 0 | Status: SHIPPED | OUTPATIENT
Start: 2022-05-10 | End: 2022-05-20

## 2022-05-10 NOTE — TELEPHONE ENCOUNTER
To Dr. Yevgeniy Vallejo---    Pt has appt today in office at [address] [place] [address]  Spoke to pt who reports symptoms started on Thursday last week with intermittent cough, nasal congestion, headache, clogged ears, and diarrhea yesterday only. Reports she has tested negative to covid twice (via home test and at work. OK to keep appt?

## 2022-05-11 ENCOUNTER — PATIENT MESSAGE (OUTPATIENT)
Dept: INTERNAL MEDICINE CLINIC | Facility: CLINIC | Age: 47
End: 2022-05-11

## 2022-05-15 RX ORDER — OMEPRAZOLE 40 MG/1
CAPSULE, DELAYED RELEASE ORAL
Qty: 90 CAPSULE | Refills: 3 | Status: SHIPPED | OUTPATIENT
Start: 2022-05-15

## 2022-06-14 ENCOUNTER — PATIENT MESSAGE (OUTPATIENT)
Dept: INTERNAL MEDICINE CLINIC | Facility: CLINIC | Age: 47
End: 2022-06-14

## 2022-06-14 NOTE — TELEPHONE ENCOUNTER
From: Woo Baron  To: Perla Russo MD  Sent: 6/14/2022 10:33 AM CDT  Subject: Covid Positive    Hi Dr Jazlyn Thomason,  I just tested positive for covid this morning on a home test and I was hoping to get the paxlovil prescription. I thought about going to the immediate care but I feel so sick and my son tested positive too. My other son and  are  from [place].    Thanks,  Jessica Sebastian

## 2022-06-14 NOTE — TELEPHONE ENCOUNTER
I signed off prescription. However please notify patient that she should hold bupropion during course of treatment of Paxlovid as there is potential drug interactions. She may resume bupropion after completing course of Paxlovid. Continue usual COVID-19 recommendations/precautions.

## 2022-07-27 RX ORDER — TOPIRAMATE 25 MG/1
25 TABLET ORAL 2 TIMES DAILY
Qty: 60 TABLET | Refills: 1 | Status: SHIPPED | OUTPATIENT
Start: 2022-07-27 | End: 2022-09-15

## 2022-08-01 RX ORDER — TOPIRAMATE 25 MG/1
TABLET ORAL
Qty: 180 TABLET | Refills: 0 | OUTPATIENT
Start: 2022-08-01

## 2022-08-02 ENCOUNTER — OFFICE VISIT (OUTPATIENT)
Dept: NEUROLOGY | Facility: CLINIC | Age: 47
End: 2022-08-02
Payer: COMMERCIAL

## 2022-08-02 VITALS
BODY MASS INDEX: 28.19 KG/M2 | SYSTOLIC BLOOD PRESSURE: 102 MMHG | DIASTOLIC BLOOD PRESSURE: 66 MMHG | WEIGHT: 186 LBS | HEIGHT: 68 IN

## 2022-08-02 DIAGNOSIS — G44.84 EXERTIONAL HEADACHE: Primary | ICD-10-CM

## 2022-08-02 DIAGNOSIS — R42 VERTIGO: ICD-10-CM

## 2022-08-02 PROCEDURE — 99215 OFFICE O/P EST HI 40 MIN: CPT | Performed by: OTHER

## 2022-08-02 PROCEDURE — 3074F SYST BP LT 130 MM HG: CPT | Performed by: OTHER

## 2022-08-02 PROCEDURE — 3078F DIAST BP <80 MM HG: CPT | Performed by: OTHER

## 2022-08-02 PROCEDURE — 3008F BODY MASS INDEX DOCD: CPT | Performed by: OTHER

## 2022-08-02 RX ORDER — ALPRAZOLAM 2 MG/1
2 TABLET ORAL ONCE
Qty: 1 TABLET | Refills: 0 | Status: SHIPPED | OUTPATIENT
Start: 2022-08-02 | End: 2022-08-02

## 2022-08-02 RX ORDER — ESCITALOPRAM OXALATE 10 MG/1
10 TABLET ORAL DAILY
COMMUNITY
Start: 2022-06-15

## 2022-09-03 ENCOUNTER — HOSPITAL ENCOUNTER (OUTPATIENT)
Age: 47
Discharge: HOME | End: 2022-09-03
Attending: EMERGENCY MEDICINE
Payer: COMMERCIAL

## 2022-09-03 VITALS
TEMPERATURE: 98 F | OXYGEN SATURATION: 98 % | HEART RATE: 58 BPM | DIASTOLIC BLOOD PRESSURE: 78 MMHG | SYSTOLIC BLOOD PRESSURE: 119 MMHG | RESPIRATION RATE: 18 BRPM

## 2022-09-03 DIAGNOSIS — J06.9 VIRAL URI: Primary | ICD-10-CM

## 2022-09-03 LAB
S PYO AG THROAT QL: NEGATIVE
SARS-COV-2 RNA RESP QL NAA+PROBE: NOT DETECTED

## 2022-09-03 PROCEDURE — 99213 OFFICE O/P EST LOW 20 MIN: CPT

## 2022-09-03 PROCEDURE — 87880 STREP A ASSAY W/OPTIC: CPT

## 2022-09-07 ENCOUNTER — TELEPHONE (OUTPATIENT)
Dept: PHYSICAL THERAPY | Facility: HOSPITAL | Age: 47
End: 2022-09-07

## 2022-09-09 ENCOUNTER — TELEPHONE (OUTPATIENT)
Dept: PHYSICAL THERAPY | Facility: HOSPITAL | Age: 47
End: 2022-09-09

## 2022-09-12 ENCOUNTER — OFFICE VISIT (OUTPATIENT)
Dept: PHYSICAL THERAPY | Age: 47
End: 2022-09-12
Attending: Other
Payer: COMMERCIAL

## 2022-09-12 DIAGNOSIS — R42 VERTIGO: ICD-10-CM

## 2022-09-12 PROCEDURE — 97112 NEUROMUSCULAR REEDUCATION: CPT

## 2022-09-12 PROCEDURE — 97161 PT EVAL LOW COMPLEX 20 MIN: CPT

## 2022-09-14 ENCOUNTER — APPOINTMENT (OUTPATIENT)
Dept: PHYSICAL THERAPY | Age: 47
End: 2022-09-14
Attending: Other
Payer: COMMERCIAL

## 2022-09-15 ENCOUNTER — OFFICE VISIT (OUTPATIENT)
Dept: SURGERY | Facility: CLINIC | Age: 47
End: 2022-09-15
Payer: COMMERCIAL

## 2022-09-15 ENCOUNTER — PATIENT MESSAGE (OUTPATIENT)
Dept: OBGYN CLINIC | Facility: CLINIC | Age: 47
End: 2022-09-15

## 2022-09-15 ENCOUNTER — ORDER TRANSCRIPTION (OUTPATIENT)
Dept: ADMINISTRATIVE | Facility: HOSPITAL | Age: 47
End: 2022-09-15

## 2022-09-15 VITALS
SYSTOLIC BLOOD PRESSURE: 110 MMHG | HEART RATE: 78 BPM | HEIGHT: 67.7 IN | DIASTOLIC BLOOD PRESSURE: 70 MMHG | OXYGEN SATURATION: 97 % | WEIGHT: 195 LBS | BODY MASS INDEX: 29.9 KG/M2

## 2022-09-15 DIAGNOSIS — E66.3 PATIENT OVERWEIGHT: ICD-10-CM

## 2022-09-15 DIAGNOSIS — Z12.31 ENCOUNTER FOR SCREENING MAMMOGRAM FOR MALIGNANT NEOPLASM OF BREAST: Primary | ICD-10-CM

## 2022-09-15 DIAGNOSIS — K21.9 GASTROESOPHAGEAL REFLUX DISEASE WITHOUT ESOPHAGITIS: ICD-10-CM

## 2022-09-15 DIAGNOSIS — Z51.81 ENCOUNTER FOR THERAPEUTIC DRUG MONITORING: Primary | ICD-10-CM

## 2022-09-15 DIAGNOSIS — E78.00 HYPERCHOLESTEROLEMIA: ICD-10-CM

## 2022-09-15 DIAGNOSIS — C85.90 NON-HODGKIN'S LYMPHOMA, UNSPECIFIED BODY REGION, UNSPECIFIED NON-HODGKIN LYMPHOMA TYPE (HCC): ICD-10-CM

## 2022-09-15 DIAGNOSIS — F32.A DEPRESSION, UNSPECIFIED DEPRESSION TYPE: ICD-10-CM

## 2022-09-15 DIAGNOSIS — E55.9 VITAMIN D DEFICIENCY: ICD-10-CM

## 2022-09-15 DIAGNOSIS — Z86.69 HX OF MIGRAINES: ICD-10-CM

## 2022-09-15 DIAGNOSIS — Z86.39 HX OF OBESITY: ICD-10-CM

## 2022-09-15 PROCEDURE — 99214 OFFICE O/P EST MOD 30 MIN: CPT | Performed by: NURSE PRACTITIONER

## 2022-09-15 PROCEDURE — 3078F DIAST BP <80 MM HG: CPT | Performed by: NURSE PRACTITIONER

## 2022-09-15 PROCEDURE — 3074F SYST BP LT 130 MM HG: CPT | Performed by: NURSE PRACTITIONER

## 2022-09-15 PROCEDURE — 3008F BODY MASS INDEX DOCD: CPT | Performed by: NURSE PRACTITIONER

## 2022-09-15 RX ORDER — TOPIRAMATE 25 MG/1
25 TABLET ORAL 2 TIMES DAILY
Qty: 180 TABLET | Refills: 0 | Status: SHIPPED | OUTPATIENT
Start: 2022-09-15

## 2022-09-15 NOTE — TELEPHONE ENCOUNTER
From: Allison Sun  To: Fransisco Beltran. [address] [place] [address], DO  Sent: 9/15/2022 2:22 PM CDT  Subject: Alcides Neumann Dr. [address] [place] [address],  I have an appointment with you in late Oct but I am due for a mammogram. Can you put the order through so I can get that taken care of prior to my appointment?   Thanks,  Elke Correa

## 2022-09-15 NOTE — TELEPHONE ENCOUNTER
Last Annual: 2/10/2021 w/NARENDRA  Next annual: 10/26/2022 w/NARENDRA  Last Mammo: 9/27/2021 negative      Order for Mammo placed per protocol. Pt notified via [address]

## 2022-09-16 ENCOUNTER — TELEPHONE (OUTPATIENT)
Dept: NEUROLOGY | Facility: CLINIC | Age: 47
End: 2022-09-16

## 2022-09-16 NOTE — TELEPHONE ENCOUNTER
Spoke to patient who states PT suggested she would have a better outcome by doing PT in conjunction with medication. Patient states she currently is not having any new symptoms and does not believe it is necessary to come in for an office visit.

## 2022-09-16 NOTE — TELEPHONE ENCOUNTER
Could we schedule her for a video visit sometime next week so I can discuss the potential harms and benefits?   Thanks,Tyler

## 2022-09-19 ENCOUNTER — OFFICE VISIT (OUTPATIENT)
Dept: PHYSICAL THERAPY | Age: 47
End: 2022-09-19
Attending: Other
Payer: COMMERCIAL

## 2022-09-19 PROCEDURE — 97112 NEUROMUSCULAR REEDUCATION: CPT

## 2022-09-19 PROCEDURE — 97140 MANUAL THERAPY 1/> REGIONS: CPT

## 2022-09-19 NOTE — PROGRESS NOTES
Dx:  Vertigo (R45)       Insurance (Authorized # of Visits):  Kala Tran PPO   [address] visits: 6   Authorizing Physician: Dr. Everton Saldana MD visit: 9/21/22  Fall Risk: standard         Precautions:     DHI eval: 36/100       Subjective: Pt states she was able to ride her bike for 4 miles yesterday. He has noticed HA a few days past week. She has a video visit scheduled for Wed with neurologist to discuss possible migraine medication. Pain level: 0/10 HA  Objective:   Scapular MMT: lats R 4/5, L 4/5                         Rhomboids R 3+/5, L 3+/5                         Mid trap R 3-/5, L 3-/5                         Lower trap R 3-/5, L 2+/5*      Assessment: Progressed gaze stabilization exercises to checkerboard background and provided HEP. Initiated suboccipital release to decrease HA. Noted increased tightness on R sided cervical musculature. Goals:   1. Pt will be I with HEP to improve therapy outcome and self manage symptoms. 2. Pt will be able to bend over without any symptoms. 3. Pt will be able to perform head movements without symptoms to be able to go shopping. Plan: assess response to treatment   Date: 9/19/2022  TX#: 2/8 Date:                 TX#: 3/ Date:                 TX#: 4/ Date:                 TX#: 5/ Date:    Tx#: 6/   NMRE:  Stationary bike x 10 min  VORx1 checkerboard side/side [id number]       Manual therapy:  Scapular MMT assessment  Supine suboccipital release, MFR R cervical musculature                     HEP: 9/19/22- self suboccipital release tennis balls, VORx1 checkerboard background    Charges: NMREx1, MTx2       Total Timed Treatment: 40 min  Total Treatment Time: 40 min

## 2022-09-21 ENCOUNTER — TELEMEDICINE (OUTPATIENT)
Dept: NEUROLOGY | Facility: CLINIC | Age: 47
End: 2022-09-21

## 2022-09-21 DIAGNOSIS — G43.809 VESTIBULAR MIGRAINE: Primary | ICD-10-CM

## 2022-09-21 PROCEDURE — 99212 OFFICE O/P EST SF 10 MIN: CPT | Performed by: OTHER

## 2022-09-26 ENCOUNTER — OFFICE VISIT (OUTPATIENT)
Dept: PHYSICAL THERAPY | Age: 47
End: 2022-09-26
Attending: Other
Payer: COMMERCIAL

## 2022-09-26 PROCEDURE — 97110 THERAPEUTIC EXERCISES: CPT

## 2022-09-26 PROCEDURE — 97140 MANUAL THERAPY 1/> REGIONS: CPT

## 2022-09-26 NOTE — PROGRESS NOTES
Dx:  Vertigo (R42)       Insurance (Authorized # of Visits):  Sherif Long PPO   [address] visits: 6   Authorizing Physician: Dr. Анна Saldana MD visit: 9/21/22  Fall Risk: standard         Precautions:     DHI eval: 36/100       Subjective: Pt states neurologist increased her dose of topomax. She states that she did not have any dizziness today. However, she has headache today and feels it more on left side. Pain level: 6/10 HA  Objective:   Scapular MMT: lats R 4/5, L 4/5                         Rhomboids R 3+/5, L 3+/5                         Mid trap R 3-/5, L 3-/5                         Lower trap R 3-/5, L 2+/5*      Assessment: Pt reported no SALEH by end of session. Initiated scapular strengthening and provided pt with HEP. Goals:   1. Pt will be I with HEP to improve therapy outcome and self manage symptoms. 2. Pt will be able to bend over without any symptoms. 3. Pt will be able to perform head movements without symptoms to be able to go shopping. Plan: assess response to treatment   Date: 9/19/2022  TX#: 2/8 Date: 9/26/22            TX#: 3/8 Date:                 TX#: 4/ Date:                 TX#: 5/ Date:    Tx#: 6/   NMRE:  Stationary bike x 10 min  VORx1 checkerboard side/side [id number] Manual therapy:  Supine suboccipital release      Manual therapy:  Scapular MMT assessment  Supine suboccipital release, MFR R cervical musculature There ex:  Supine horiz abd red TB 15x  Supine D2 flex red TB 15x R/L                    HEP: 9/19/22- self suboccipital release tennis balls, VORx1 checkerboard background          9/26/22- supine horiz abd, D2 flex red TB     Charges: TEx1, MTx2       Total Timed Treatment: 40 min  Total Treatment Time: 40 min

## 2022-09-30 DIAGNOSIS — F32.A DEPRESSION, UNSPECIFIED DEPRESSION TYPE: ICD-10-CM

## 2022-09-30 DIAGNOSIS — E55.9 VITAMIN D DEFICIENCY: ICD-10-CM

## 2022-09-30 DIAGNOSIS — K21.9 GASTROESOPHAGEAL REFLUX DISEASE WITHOUT ESOPHAGITIS: ICD-10-CM

## 2022-09-30 DIAGNOSIS — Z51.81 ENCOUNTER FOR THERAPEUTIC DRUG MONITORING: ICD-10-CM

## 2022-09-30 DIAGNOSIS — E78.00 HYPERCHOLESTEROLEMIA: ICD-10-CM

## 2022-09-30 DIAGNOSIS — C85.90 NON-HODGKIN'S LYMPHOMA, UNSPECIFIED BODY REGION, UNSPECIFIED NON-HODGKIN LYMPHOMA TYPE (HCC): ICD-10-CM

## 2022-09-30 RX ORDER — TOPIRAMATE 25 MG/1
TABLET ORAL
Qty: 180 TABLET | Refills: 0 | Status: SHIPPED | OUTPATIENT
Start: 2022-09-30

## 2022-10-03 ENCOUNTER — HOSPITAL ENCOUNTER (OUTPATIENT)
Dept: MAMMOGRAPHY | Age: 47
Discharge: HOME | End: 2022-10-03
Attending: OBSTETRICS & GYNECOLOGY
Payer: COMMERCIAL

## 2022-10-03 ENCOUNTER — PATIENT MESSAGE (OUTPATIENT)
Dept: NEUROLOGY | Facility: CLINIC | Age: 47
End: 2022-10-03

## 2022-10-03 ENCOUNTER — OFFICE VISIT (OUTPATIENT)
Dept: PHYSICAL THERAPY | Age: 47
End: 2022-10-03
Attending: Other
Payer: COMMERCIAL

## 2022-10-03 DIAGNOSIS — Z12.31 ENCOUNTER FOR SCREENING MAMMOGRAM FOR MALIGNANT NEOPLASM OF BREAST: ICD-10-CM

## 2022-10-03 PROCEDURE — 97110 THERAPEUTIC EXERCISES: CPT

## 2022-10-03 PROCEDURE — 77067 SCR MAMMO BI INCL CAD: CPT | Performed by: OBSTETRICS & GYNECOLOGY

## 2022-10-03 PROCEDURE — 77063 BREAST TOMOSYNTHESIS BI: CPT | Performed by: OBSTETRICS & GYNECOLOGY

## 2022-10-03 PROCEDURE — 97530 THERAPEUTIC ACTIVITIES: CPT

## 2022-10-03 NOTE — TELEPHONE ENCOUNTER
From: Juan Postal  To: Kecia Short DO  Sent: 10/3/2022 8:34 AM CDT  Subject: MRI    Hi Dr. Tam Londono,    When scheduling my MRI I noticed that [facility] AND [facility] now has a new Open MRI. Is that possible to have it done for my tests?     Thanks,  Cem Ramirez

## 2022-10-03 NOTE — PROGRESS NOTES
Pavel  Pt has attended 4 visits in Physical Therapy. Dx:  Vertigo (R42)       Insurance (Authorized # of Visits):  Manav Wong PPO   [address] visits: 6   Authorizing Physician: Dr. Enid Saldana MD visit: 9/21/22  Fall Risk: standard         Precautions:     DHI eval: 36/100       Subjective: Pt states that since starting topomax she has not had any dizziness. She got her period this week and thinks that headache may have been related to that. She feels pretty good and is ready for discharge. Pain level: 0/10 HA  Objective:   Scapular MMT: lats R 4/5, L 4/5                         Rhomboids R 3+/5, L 3+/5                         Mid trap R 3-/5, L 3-/5                         Lower trap R 3-/5, L 2+/5*  Postural Control:   Romberg: EO 30 sec, EC 30 sec+min sway   Romberg on Foam: EO 30sec , EC 30 sec + mod sway   Tandem Stance: R back EO 30 sec, EC 30 sec, LOB; L back EO 30 sec, EC 20 sec +LOB      Assessment: Pt has attended 4 PT sessions for vestibular migraine. Pt presents with improved postural stability and no symptoms with testing. Pt has been given HEP to address scapular strength and postural stability. Pt has met 3/3 LTGs. Discharge pt at this time. Goals:   1. Pt will be I with HEP to improve therapy outcome and self manage symptoms. - MET  2. Pt will be able to bend over without any symptoms.- MET  3. Pt will be able to perform head movements without symptoms to be able to go shopping. -MET    Plan: discharge pt   Date: 9/19/2022  TX#: 2/8 Date: 9/26/22            TX#: 3/8 Date: 10/3/22             TX#: 4/8 Date:                 TX#: 5/ Date:    Tx#: 6/ NMRE:  Stationary bike x 10 min  VORx1 checkerboard side/side [id number] Manual therapy:  Supine suboccipital release There ex:  horiz abd red TB 15x  D2 flex red TB 15x R/L  Rows green TB 15x  Sh ext green TB 15x  Ws green TB 15x     Manual therapy:  Scapular MMT assessment  Supine suboccipital release, MFR R cervical musculature There ex:  Supine horiz abd red TB 15x  Supine D2 flex red TB 15x R/L There act:  Postural stability testing  Discussed HEP                   HEP: 9/19/22- self suboccipital release tennis balls, VORx1 checkerboard background          9/26/22- supine horiz abd, D2 flex red TB           10/3/22- romberg EC on pillow, tandem EC, rows/sh ext green TB     Charges: TEx1, TAx1     Total Timed Treatment: 30 min  Total Treatment Time: 30 min

## 2022-10-03 NOTE — TELEPHONE ENCOUNTER
From: Jose Armando Quezada  To: Chaparro Brush DO  Sent: 10/3/2022 8:34 AM CDT  Subject: MRI    Hi Dr. Everton Morgan,    When scheduling my MRI I noticed that [facility] AND [facility] now has a new Open MRI. Is that possible to have it done for my tests?     Thanks,  Tushar Rebollar

## 2022-10-03 NOTE — TELEPHONE ENCOUNTER
From: Ezra Gregorio  To: Tomi Ventura, DO  Sent: 10/3/2022 8:34 AM CDT  Subject: MRI    Hi Dr. Rikki Fair,    When scheduling my MRI I noticed that [facility] AND [facility] now has a new Open MRI. Is that possible to have it done for my tests?     Thanks,  Ele Borjas

## 2022-10-04 ENCOUNTER — TELEPHONE (OUTPATIENT)
Dept: NEUROLOGY | Facility: CLINIC | Age: 47
End: 2022-10-04

## 2022-10-04 NOTE — TELEPHONE ENCOUNTER
Spoke to [facility] and clarified tests ordered which are MRI brain wwo, MRA brain wo, and MRA carotids wwo. She was understanding. Orders faxed to [phone].

## 2022-10-04 NOTE — TELEPHONE ENCOUNTER
Jessica Whatley from [facility] needs  to clarify exactly what Patient needs done. Her number is [phone] Ext. [phone]. She said Patient said it should be (6) different tests but they only have Orders for a couple.

## 2022-10-10 ENCOUNTER — APPOINTMENT (OUTPATIENT)
Dept: PHYSICAL THERAPY | Age: 47
End: 2022-10-10
Attending: Other
Payer: COMMERCIAL

## 2022-10-17 ENCOUNTER — APPOINTMENT (OUTPATIENT)
Dept: PHYSICAL THERAPY | Age: 47
End: 2022-10-17
Attending: Other
Payer: COMMERCIAL

## 2022-10-21 ENCOUNTER — TELEPHONE (OUTPATIENT)
Dept: NEUROLOGY | Facility: CLINIC | Age: 47
End: 2022-10-21

## 2022-10-21 NOTE — TELEPHONE ENCOUNTER
MR Angio/veno neck wo w contrast report received via fax. Report has been placed on Dr. Sinai Isabel desk to review. Report has also been sent for scanning. Reviewed and electronically signed by:  [address] [facility], [address] [place] [address], [facility]

## 2022-10-24 ENCOUNTER — APPOINTMENT (OUTPATIENT)
Dept: PHYSICAL THERAPY | Age: 47
End: 2022-10-24
Attending: Other
Payer: COMMERCIAL

## 2022-10-26 ENCOUNTER — OFFICE VISIT (OUTPATIENT)
Dept: OBGYN CLINIC | Facility: CLINIC | Age: 47
End: 2022-10-26
Payer: COMMERCIAL

## 2022-10-26 VITALS
WEIGHT: 196.81 LBS | BODY MASS INDEX: 30 KG/M2 | DIASTOLIC BLOOD PRESSURE: 84 MMHG | HEART RATE: 63 BPM | SYSTOLIC BLOOD PRESSURE: 123 MMHG

## 2022-10-26 DIAGNOSIS — Z01.419 ENCOUNTER FOR GYNECOLOGICAL EXAMINATION WITHOUT ABNORMAL FINDING: Primary | ICD-10-CM

## 2022-10-26 DIAGNOSIS — Z12.4 SCREENING FOR MALIGNANT NEOPLASM OF CERVIX: ICD-10-CM

## 2022-10-26 PROCEDURE — 3074F SYST BP LT 130 MM HG: CPT | Performed by: OBSTETRICS & GYNECOLOGY

## 2022-10-26 PROCEDURE — 3079F DIAST BP 80-89 MM HG: CPT | Performed by: OBSTETRICS & GYNECOLOGY

## 2022-10-27 LAB — HPV I/H RISK 1 DNA SPEC QL NAA+PROBE: NEGATIVE

## 2022-10-31 ENCOUNTER — APPOINTMENT (OUTPATIENT)
Dept: PHYSICAL THERAPY | Age: 47
End: 2022-10-31
Attending: Other
Payer: COMMERCIAL

## 2022-11-02 ENCOUNTER — APPOINTMENT (OUTPATIENT)
Dept: PHYSICAL THERAPY | Age: 47
End: 2022-11-02
Attending: Other
Payer: COMMERCIAL

## 2022-11-07 ENCOUNTER — APPOINTMENT (OUTPATIENT)
Dept: PHYSICAL THERAPY | Age: 47
End: 2022-11-07
Attending: Other
Payer: COMMERCIAL

## 2022-11-08 ENCOUNTER — MED REC SCAN ONLY (OUTPATIENT)
Dept: NEUROLOGY | Facility: CLINIC | Age: 47
End: 2022-11-08

## 2022-11-08 ENCOUNTER — TELEPHONE (OUTPATIENT)
Dept: NEUROLOGY | Facility: CLINIC | Age: 47
End: 2022-11-08

## 2022-11-08 NOTE — TELEPHONE ENCOUNTER
MRI BRAIN WO W Contrast Result Received - Exam date 10/20/22    Report placed on Dr Tim solis for review.   Copy sent to scanning

## 2022-11-11 ENCOUNTER — TELEPHONE (OUTPATIENT)
Dept: NEUROLOGY | Facility: CLINIC | Age: 47
End: 2022-11-11

## 2022-11-11 NOTE — TELEPHONE ENCOUNTER
Received report on the MRA of the head w/o contrast.  Report has been placed on Dr. Tyron Blum desk to review. Copy has also been sent for scanning. Reviewed and electronically signed by:  [address] [facility], [address] [place] [address], [facility]

## 2022-11-12 NOTE — TELEPHONE ENCOUNTER
Sent patient a Intr[employer] message advising her that the results were normal.  We will need a copy of the disc to review the imaging personally. She should bring it to her next visit.

## 2022-11-15 ENCOUNTER — PATIENT MESSAGE (OUTPATIENT)
Dept: NEUROLOGY | Facility: CLINIC | Age: 47
End: 2022-11-15

## 2022-11-15 DIAGNOSIS — R93.0 ABNORMAL MRA, HEAD: Primary | ICD-10-CM

## 2022-11-15 NOTE — TELEPHONE ENCOUNTER
First available appt 1/23/22      Called & spoke to patient. She had MRA Head with contrast Wednesday 11/9/22 with [facility] (The imaging report sent via [employer]Contact message from pt on 11/11/22. 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 Patient would like to know what this means. She did google it & saw she may be more likely to have stroke per google. She was requesting to speak to MD or requesting he send an answer via Bene[employer]. She would like CTA neck ordered if Dr Rikki Fair feels necessary. She prefers not to wait until January to discuss it.

## 2022-11-15 NOTE — TELEPHONE ENCOUNTER
From: Woo Baron  To: Beryle Provost, DO  Sent: 11/15/2022 10:24 AM CST  Subject: Follow-Up    I would like to schedule a video visit to discuss the results to the MRI/MRA but [facility] The next available appointment is in January and I have questions I do not want to wait until January to have answered.   Thanks,  Jessica Sebastian

## 2022-11-15 NOTE — TELEPHONE ENCOUNTER
Called & spoke to patient to notify of message from Dr Jermaine Bowers as noted below. Pt verbalized understanding & appreciative of call.      Provided pt with phone number to central scheduling to schedule at time of call

## 2022-11-21 ENCOUNTER — TELEPHONE (OUTPATIENT)
Dept: CASE MANAGEMENT | Age: 47
End: 2022-11-21

## 2022-11-21 NOTE — TELEPHONE ENCOUNTER
The health plan has denied requests. Listed below is the denial rationale for both Cpt codes, [id number] and [id number]. You may reach out to the health plan to discuss denial rationale in further detail at [phone], reference case # [id number]. Also, reach out to patient with plan of care. Thank you,     Angiography, Head - CT  Clinical Rationale: Your doctor ordered a test that takes pictures of the blood vessels in your head. This test should be used when your doctor is looking for certain conditions. These might include a widening of the blood vessel in the brain, a tear in a blood vessel wall, or bleeding. We reviewed the notes we have. The notes do not show that your doctor is looking for any of these conditions. Based on the information we have, this test is not medically necessary. We used [facility] Guideline titled Vascular Imaging to make this decision. You may view this guideline at [email or web address]. html      Angiography, Neck - CT  Clinical Rationale: Your doctor is checking you for a narrowing or blockage of a blood vessel in the neck. Your doctor ordered a test that takes pictures of the blood vessels in your neck. This test should be used when there are symptoms of this condition. These could include confusion, vision problems, difficulty speaking, or numbness. We reviewed the notes we have. The notes do not show that you have such symptoms. Based on the information we have, this test is not medically necessary. We used [employer] [facility] Guideline titled Vascular Imaging to make this decision. You may view this guideline at [email or web address].

## 2022-11-23 RX ORDER — BECLOMETHASONE DIPROPIONATE 80 UG/1
AEROSOL, METERED NASAL
Qty: 10.6 G | Refills: 0 | Status: SHIPPED | OUTPATIENT
Start: 2022-11-23

## 2023-01-03 RX ORDER — BECLOMETHASONE DIPROPIONATE 80 UG/1
2 AEROSOL, METERED NASAL DAILY
Qty: 10.6 G | Refills: 0 | Status: SHIPPED | OUTPATIENT
Start: 2023-01-03

## 2023-01-03 RX ORDER — BECLOMETHASONE DIPROPIONATE 80 UG/1
AEROSOL, METERED NASAL
Qty: 10.6 G | Refills: 0 | OUTPATIENT
Start: 2023-01-03

## 2023-01-03 NOTE — TELEPHONE ENCOUNTER
Pt last seen on 12/31/2021. Refill denied. RN notified pt via [employer] that she will need a follow up for further refills.

## 2023-01-04 ENCOUNTER — TELEPHONE (OUTPATIENT)
Dept: INTERNAL MEDICINE CLINIC | Facility: CLINIC | Age: 48
End: 2023-01-04

## 2023-01-04 ENCOUNTER — OFFICE VISIT (OUTPATIENT)
Dept: INTERNAL MEDICINE CLINIC | Facility: CLINIC | Age: 48
End: 2023-01-04
Payer: COMMERCIAL

## 2023-01-04 VITALS
HEART RATE: 50 BPM | BODY MASS INDEX: 30.97 KG/M2 | TEMPERATURE: 98 F | DIASTOLIC BLOOD PRESSURE: 64 MMHG | HEIGHT: 67.7 IN | WEIGHT: 202 LBS | SYSTOLIC BLOOD PRESSURE: 110 MMHG | OXYGEN SATURATION: 97 %

## 2023-01-04 DIAGNOSIS — J01.90 ACUTE NON-RECURRENT SINUSITIS, UNSPECIFIED LOCATION: Primary | ICD-10-CM

## 2023-01-04 DIAGNOSIS — J32.9 CHRONIC SINUSITIS, UNSPECIFIED LOCATION: ICD-10-CM

## 2023-01-04 PROCEDURE — 99213 OFFICE O/P EST LOW 20 MIN: CPT | Performed by: INTERNAL MEDICINE

## 2023-01-04 PROCEDURE — 3078F DIAST BP <80 MM HG: CPT | Performed by: INTERNAL MEDICINE

## 2023-01-04 PROCEDURE — 3074F SYST BP LT 130 MM HG: CPT | Performed by: INTERNAL MEDICINE

## 2023-01-04 PROCEDURE — 3008F BODY MASS INDEX DOCD: CPT | Performed by: INTERNAL MEDICINE

## 2023-01-04 RX ORDER — LEVOFLOXACIN 500 MG/1
500 TABLET, FILM COATED ORAL DAILY
Qty: 10 TABLET | Refills: 0 | Status: SHIPPED | OUTPATIENT
Start: 2023-01-04 | End: 2023-01-14

## 2023-01-04 NOTE — TELEPHONE ENCOUNTER
Patient scheduled an appointment this morning via [employer] with Dr Andree Domínguez today at 2:40pm. Patient states reasoning to be, \"sinus issues. \" No other symptoms listed. Okay to keep appointment?    Routed high as appointment is today at 2:40pm.

## 2023-01-05 ENCOUNTER — TELEMEDICINE (OUTPATIENT)
Dept: SURGERY | Facility: CLINIC | Age: 48
End: 2023-01-05

## 2023-01-05 VITALS — BODY MASS INDEX: 31 KG/M2 | WEIGHT: 202 LBS

## 2023-01-05 DIAGNOSIS — E55.9 VITAMIN D DEFICIENCY: ICD-10-CM

## 2023-01-05 DIAGNOSIS — Z86.69 HX OF MIGRAINES: ICD-10-CM

## 2023-01-05 DIAGNOSIS — E66.9 OBESITY (BMI 30-39.9): ICD-10-CM

## 2023-01-05 DIAGNOSIS — E78.00 HYPERCHOLESTEROLEMIA: ICD-10-CM

## 2023-01-05 DIAGNOSIS — Z51.81 ENCOUNTER FOR THERAPEUTIC DRUG MONITORING: Primary | ICD-10-CM

## 2023-01-05 DIAGNOSIS — K21.9 GASTROESOPHAGEAL REFLUX DISEASE WITHOUT ESOPHAGITIS: ICD-10-CM

## 2023-01-05 DIAGNOSIS — F32.A DEPRESSION, UNSPECIFIED DEPRESSION TYPE: ICD-10-CM

## 2023-01-05 DIAGNOSIS — C85.90 NON-HODGKIN'S LYMPHOMA, UNSPECIFIED BODY REGION, UNSPECIFIED NON-HODGKIN LYMPHOMA TYPE (HCC): ICD-10-CM

## 2023-01-05 RX ORDER — LISDEXAMFETAMINE DIMESYLATE 10 MG/1
10 CAPSULE ORAL DAILY
Qty: 30 CAPSULE | Refills: 0 | Status: SHIPPED | OUTPATIENT
Start: 2023-02-05 | End: 2023-03-07

## 2023-01-05 RX ORDER — LISDEXAMFETAMINE DIMESYLATE 10 MG/1
10 CAPSULE ORAL DAILY
Qty: 30 CAPSULE | Refills: 0 | Status: SHIPPED | OUTPATIENT
Start: 2023-01-05 | End: 2023-02-04

## 2023-01-09 NOTE — TELEPHONE ENCOUNTER
[employer] Approved CTA Head and CTA Neck following Appeal with authorization #[id number] valid 11/17/2022-1/15/2023        Patient notified via [email or web address]

## 2023-02-08 RX ORDER — BECLOMETHASONE DIPROPIONATE 80 UG/1
2 AEROSOL, METERED NASAL DAILY
Qty: 10.6 G | Refills: 0 | Status: SHIPPED | OUTPATIENT
Start: 2023-02-08

## 2023-02-08 NOTE — TELEPHONE ENCOUNTER
Received refill request for Qnals nasal spray. Last office visit was 12/13/21 for allergies. Appointment scheduled 2/27/23. Medication meets protocol- refilled.

## 2023-02-27 ENCOUNTER — OFFICE VISIT (OUTPATIENT)
Dept: ALLERGY | Facility: CLINIC | Age: 48
End: 2023-02-27

## 2023-02-27 VITALS
HEIGHT: 67.7 IN | BODY MASS INDEX: 31.58 KG/M2 | DIASTOLIC BLOOD PRESSURE: 76 MMHG | WEIGHT: 206 LBS | HEART RATE: 77 BPM | SYSTOLIC BLOOD PRESSURE: 118 MMHG

## 2023-02-27 DIAGNOSIS — Z23 FLU VACCINE NEED: ICD-10-CM

## 2023-02-27 DIAGNOSIS — J30.89 SEASONAL AND PERENNIAL ALLERGIC RHINOCONJUNCTIVITIS: ICD-10-CM

## 2023-02-27 DIAGNOSIS — Z92.29 COVID-19 VACCINE SERIES COMPLETED: ICD-10-CM

## 2023-02-27 DIAGNOSIS — J30.2 SEASONAL AND PERENNIAL ALLERGIC RHINOCONJUNCTIVITIS: ICD-10-CM

## 2023-02-27 DIAGNOSIS — J34.89 NASAL DRYNESS: ICD-10-CM

## 2023-02-27 DIAGNOSIS — J32.8 OTHER CHRONIC SINUSITIS: Primary | ICD-10-CM

## 2023-02-27 DIAGNOSIS — H10.10 SEASONAL AND PERENNIAL ALLERGIC RHINOCONJUNCTIVITIS: ICD-10-CM

## 2023-02-27 PROCEDURE — 3008F BODY MASS INDEX DOCD: CPT | Performed by: ALLERGY & IMMUNOLOGY

## 2023-02-27 PROCEDURE — 3078F DIAST BP <80 MM HG: CPT | Performed by: ALLERGY & IMMUNOLOGY

## 2023-02-27 PROCEDURE — 3074F SYST BP LT 130 MM HG: CPT | Performed by: ALLERGY & IMMUNOLOGY

## 2023-02-27 PROCEDURE — 99214 OFFICE O/P EST MOD 30 MIN: CPT | Performed by: ALLERGY & IMMUNOLOGY

## 2023-02-27 RX ORDER — BECLOMETHASONE DIPROPIONATE 80 UG/1
2 AEROSOL, METERED NASAL DAILY
Qty: 3 EACH | Refills: 2 | Status: SHIPPED | OUTPATIENT
Start: 2023-02-27

## 2023-02-27 RX ORDER — LEVOCETIRIZINE DIHYDROCHLORIDE 2.5 MG/5ML
2.5 SOLUTION ORAL EVERY EVENING
Qty: 3 EACH | Refills: 2 | Status: SHIPPED | OUTPATIENT
Start: 2023-02-27

## 2023-02-27 NOTE — PATIENT INSTRUCTIONS
#1 chronic sinusitis. No history of nasal polyps  1 course of antibiotics over the past year. No prednisone. No current fevers or mucopurulence. Continue treatment of underlying environmental allergies. Trial of nasal gel for underlying nasal dryness. Recommend humidified air as well  Consider sinus rinses as needed    #2 allergic rhinitis  Continue with Qnasl and Xyzal.  Reviewed avoidance measures and potential treatment option immunotherapy. Prior testing was positive to dust mites.     #3 COVID vaccinations up-to-date  Recommend booster once indicated    #4 flu vaccine recommended in the fall    #5 nasal dryness  Trial of nasal gel and moisturizer, humidified air

## 2023-03-09 ENCOUNTER — PATIENT MESSAGE (OUTPATIENT)
Dept: ALLERGY | Facility: CLINIC | Age: 48
End: 2023-03-09

## 2023-03-09 RX ORDER — LEVOCETIRIZINE DIHYDROCHLORIDE 2.5 MG/5ML
2.5 SOLUTION ORAL EVERY EVENING
Qty: 3 EACH | Refills: 2 | OUTPATIENT
Start: 2023-03-09

## 2023-03-09 RX ORDER — LEVOCETIRIZINE DIHYDROCHLORIDE 5 MG/1
2.5 TABLET, FILM COATED ORAL EVERY EVENING
Qty: 30 TABLET | Refills: 5 | Status: SHIPPED | OUTPATIENT
Start: 2023-03-09

## 2023-03-09 NOTE — TELEPHONE ENCOUNTER
From: [place]  To: Catalina Corbin MD  Sent: 3/9/2023 10:58 AM CST  Subject: Mediation    Hi Dr. Kristen Freed,  I went to  the Xyzal and it was in a liquid form. I usually take it right before bed so I think this would be tough for me. I ended up having them put it back and requesting a different refill but it was denied. Could I get a prescription for the Xyzal pills? I know I can get it over the counter but it is so much cheaper with the prescription. Thanks,  Terrell Alexandre      Patient was seen in Allergy 2/27/2023 for . . . Other chronic sinusitis  (primary encounter diagnosis)  Seasonal and perennial allergic rhinoconjunctivitis  Flu vaccine need  Covid-19 vaccine series completed  Nasal dryness    Rx pended.

## 2023-03-09 NOTE — TELEPHONE ENCOUNTER
Spoke with Ev Conard, pharmacist at [facility] regarding refill request for Xyzal liquid. Informed pharmacist Dr. Tata Gutierrez sent a prescription on 2/27/23 with refills. Pharmacist stated they received the refill and to please disregard.

## 2023-03-13 ENCOUNTER — PATIENT MESSAGE (OUTPATIENT)
Dept: OBGYN CLINIC | Facility: CLINIC | Age: 48
End: 2023-03-13

## 2023-03-13 DIAGNOSIS — Z80.3 FAMILY HISTORY OF BREAST CANCER: Primary | ICD-10-CM

## 2023-03-13 NOTE — TELEPHONE ENCOUNTER
From: Carlita Donato  To: Inocencio Omalley. [address] [facility]  Sent: 3/13/2023 10:19 AM CDT  Subject: BRCA    Hi Dr. Satinder Pinzon,  I have been thinking about my health a lot and wondering if I should get tested for the BRCA gene. My paternal grandmother had breast cancer at the age of 25. With my history of Lymphoma, I think I would like to get tested if I qualify.   Thanks,  Rowdy Rosa

## 2023-03-13 NOTE — TELEPHONE ENCOUNTER
My Chart message sent. I agree with high risk breast consult. Can RN send her the contact info please? Thank you.

## 2023-03-23 ENCOUNTER — NURSE ONLY (OUTPATIENT)
Dept: HEMATOLOGY/ONCOLOGY | Facility: HOSPITAL | Age: 48
End: 2023-03-23
Attending: GENETIC COUNSELOR, MS
Payer: COMMERCIAL

## 2023-03-23 ENCOUNTER — GENETICS ENCOUNTER (OUTPATIENT)
Dept: GENETICS | Facility: HOSPITAL | Age: 48
End: 2023-03-23
Attending: GENETIC COUNSELOR, MS
Payer: COMMERCIAL

## 2023-03-23 DIAGNOSIS — Z85.72 HISTORY OF NON-HODGKIN'S LYMPHOMA: ICD-10-CM

## 2023-03-23 DIAGNOSIS — Z80.3 FAMILY HISTORY OF MALIGNANT NEOPLASM OF BREAST: Primary | ICD-10-CM

## 2023-03-23 PROCEDURE — 36415 COLL VENOUS BLD VENIPUNCTURE: CPT

## 2023-03-23 PROCEDURE — 96040 HC GENETIC COUNSELING EA 30 MIN: CPT | Performed by: GENETIC COUNSELOR, MS

## 2023-04-03 ENCOUNTER — TELEPHONE (OUTPATIENT)
Dept: OBGYN CLINIC | Facility: CLINIC | Age: 48
End: 2023-04-03

## 2023-04-10 ENCOUNTER — TELEPHONE (OUTPATIENT)
Dept: GENETICS | Facility: HOSPITAL | Age: 48
End: 2023-04-10

## 2023-04-10 NOTE — TELEPHONE ENCOUNTER
Shared genetic testing results with Adalid Isabel over phone. 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 Reminded her no change in management with VUS results. Released to her through lab's portal and will mail her a copy of results. All her questions were answered to the best of my ability and she was appreciative of the call.

## 2023-04-27 ENCOUNTER — OFFICE VISIT (OUTPATIENT)
Dept: SURGERY | Facility: CLINIC | Age: 48
End: 2023-04-27
Payer: COMMERCIAL

## 2023-04-27 VITALS
OXYGEN SATURATION: 100 % | DIASTOLIC BLOOD PRESSURE: 80 MMHG | HEIGHT: 67.7 IN | WEIGHT: 206 LBS | BODY MASS INDEX: 31.58 KG/M2 | HEART RATE: 64 BPM | SYSTOLIC BLOOD PRESSURE: 112 MMHG

## 2023-04-27 DIAGNOSIS — E55.9 VITAMIN D DEFICIENCY: ICD-10-CM

## 2023-04-27 DIAGNOSIS — C85.90 NON-HODGKIN'S LYMPHOMA, UNSPECIFIED BODY REGION, UNSPECIFIED NON-HODGKIN LYMPHOMA TYPE (HCC): ICD-10-CM

## 2023-04-27 DIAGNOSIS — E78.00 HYPERCHOLESTEROLEMIA: Primary | ICD-10-CM

## 2023-04-27 DIAGNOSIS — K21.9 GASTROESOPHAGEAL REFLUX DISEASE WITHOUT ESOPHAGITIS: ICD-10-CM

## 2023-04-27 DIAGNOSIS — Z51.81 ENCOUNTER FOR THERAPEUTIC DRUG MONITORING: ICD-10-CM

## 2023-04-27 DIAGNOSIS — E66.9 OBESITY (BMI 30-39.9): ICD-10-CM

## 2023-04-27 DIAGNOSIS — F32.A DEPRESSION, UNSPECIFIED DEPRESSION TYPE: ICD-10-CM

## 2023-04-27 DIAGNOSIS — Z86.69 HX OF MIGRAINES: ICD-10-CM

## 2023-04-27 PROCEDURE — 3079F DIAST BP 80-89 MM HG: CPT | Performed by: NURSE PRACTITIONER

## 2023-04-27 PROCEDURE — 99214 OFFICE O/P EST MOD 30 MIN: CPT | Performed by: NURSE PRACTITIONER

## 2023-04-27 PROCEDURE — 3008F BODY MASS INDEX DOCD: CPT | Performed by: NURSE PRACTITIONER

## 2023-04-27 PROCEDURE — 3074F SYST BP LT 130 MM HG: CPT | Performed by: NURSE PRACTITIONER

## 2023-04-27 RX ORDER — SEMAGLUTIDE 0.25 MG/.5ML
0.25 INJECTION, SOLUTION SUBCUTANEOUS WEEKLY
Qty: 4 EACH | Refills: 1 | Status: SHIPPED | OUTPATIENT
Start: 2023-04-27

## 2023-06-05 RX ORDER — OMEPRAZOLE 40 MG/1
CAPSULE, DELAYED RELEASE ORAL
Qty: 90 CAPSULE | Refills: 0 | Status: SHIPPED | OUTPATIENT
Start: 2023-06-05

## 2023-06-14 RX ORDER — OMEPRAZOLE 40 MG/1
CAPSULE, DELAYED RELEASE ORAL
Qty: 90 CAPSULE | Refills: 0 | OUTPATIENT
Start: 2023-06-14

## 2023-06-29 ENCOUNTER — TELEPHONE (OUTPATIENT)
Dept: SURGERY | Facility: CLINIC | Age: 48
End: 2023-06-29

## 2023-07-10 ENCOUNTER — TELEPHONE (OUTPATIENT)
Dept: SURGERY | Facility: CLINIC | Age: 48
End: 2023-07-10

## 2023-07-11 NOTE — TELEPHONE ENCOUNTER
Spoke to physician. Has concerns re: Vyvanse withdrawal symptoms for patient. Discussed potential options for treatment. Will discuss further with patient. VM left with patient Await CB.

## 2023-07-14 ENCOUNTER — OFFICE VISIT (OUTPATIENT)
Dept: FAMILY MEDICINE CLINIC | Facility: CLINIC | Age: 48
End: 2023-07-14
Payer: COMMERCIAL

## 2023-07-14 VITALS
TEMPERATURE: 97 F | OXYGEN SATURATION: 98 % | WEIGHT: 195 LBS | RESPIRATION RATE: 18 BRPM | DIASTOLIC BLOOD PRESSURE: 81 MMHG | SYSTOLIC BLOOD PRESSURE: 111 MMHG | HEIGHT: 68 IN | BODY MASS INDEX: 29.55 KG/M2 | HEART RATE: 78 BPM

## 2023-07-14 DIAGNOSIS — J06.9 VIRAL URI WITH COUGH: Primary | ICD-10-CM

## 2023-07-14 DIAGNOSIS — J02.9 SORE THROAT: ICD-10-CM

## 2023-07-14 DIAGNOSIS — Z88.9 H/O SEASONAL ALLERGIES: ICD-10-CM

## 2023-07-14 LAB
CONTROL LINE PRESENT WITH A CLEAR BACKGROUND (YES/NO): YES YES/NO
KIT LOT #: NORMAL NUMERIC
STREP GRP A CUL-SCR: NEGATIVE

## 2023-07-14 PROCEDURE — 87880 STREP A ASSAY W/OPTIC: CPT

## 2023-07-14 PROCEDURE — 99213 OFFICE O/P EST LOW 20 MIN: CPT

## 2023-07-14 PROCEDURE — 3074F SYST BP LT 130 MM HG: CPT

## 2023-07-14 PROCEDURE — 3008F BODY MASS INDEX DOCD: CPT

## 2023-07-14 PROCEDURE — 3079F DIAST BP 80-89 MM HG: CPT

## 2023-07-14 RX ORDER — ALBUTEROL SULFATE 90 UG/1
2 AEROSOL, METERED RESPIRATORY (INHALATION) EVERY 4 HOURS PRN
Qty: 1 EACH | Refills: 0 | Status: SHIPPED | OUTPATIENT
Start: 2023-07-14

## 2023-07-18 ENCOUNTER — PATIENT MESSAGE (OUTPATIENT)
Dept: INTERNAL MEDICINE CLINIC | Facility: CLINIC | Age: 48
End: 2023-07-18

## 2023-07-18 NOTE — TELEPHONE ENCOUNTER
From: Aldair Yeager  To: Sarahi Hanson MD  Sent: 7/18/2023 3:16 PM CDT  Subject: Follow Up from Urgent Care    Hi Dr Sonya Lofton,  I went to Urgent Care Friday because I had a sore throat, laryngitis and a terrible headache. I wanted to rule out strep, which I did. The dr thought it was viral and said to read . She said I did have fluid behind my right ear. In the days since that appointment, my throat seems better but I have a cough and my throat ear has so much pressure in it. Do you think I need an antibiotic or should I wait it out?   Thanks,  Jaylin Cabrera

## 2023-07-21 ENCOUNTER — OFFICE VISIT (OUTPATIENT)
Dept: FAMILY MEDICINE CLINIC | Facility: CLINIC | Age: 48
End: 2023-07-21
Payer: COMMERCIAL

## 2023-07-21 VITALS
HEART RATE: 85 BPM | RESPIRATION RATE: 18 BRPM | TEMPERATURE: 98 F | DIASTOLIC BLOOD PRESSURE: 60 MMHG | BODY MASS INDEX: 31 KG/M2 | WEIGHT: 206 LBS | SYSTOLIC BLOOD PRESSURE: 104 MMHG | OXYGEN SATURATION: 98 %

## 2023-07-21 DIAGNOSIS — H65.93 MEE (MIDDLE EAR EFFUSION), BILATERAL: ICD-10-CM

## 2023-07-21 DIAGNOSIS — J01.10 ACUTE FRONTAL SINUSITIS, RECURRENCE NOT SPECIFIED: Primary | ICD-10-CM

## 2023-07-21 PROCEDURE — 99213 OFFICE O/P EST LOW 20 MIN: CPT | Performed by: NURSE PRACTITIONER

## 2023-07-21 PROCEDURE — 3074F SYST BP LT 130 MM HG: CPT | Performed by: NURSE PRACTITIONER

## 2023-07-21 PROCEDURE — 3078F DIAST BP <80 MM HG: CPT | Performed by: NURSE PRACTITIONER

## 2023-07-21 RX ORDER — AMOXICILLIN AND CLAVULANATE POTASSIUM 875; 125 MG/1; MG/1
1 TABLET, FILM COATED ORAL 2 TIMES DAILY
Qty: 14 TABLET | Refills: 0 | Status: SHIPPED | OUTPATIENT
Start: 2023-07-21 | End: 2023-07-28

## 2023-07-21 NOTE — PATIENT INSTRUCTIONS
Tylenol and Motrin as needed  Take Augmentin antibiotic as prescribed, finish all 7 days  Rest, push fluids  Mucinex DM over the counter for nasal congestion/cough  Continue antihistamine and q nasal daily  Use Albuterol as needed for shortness of breath  Go to the IC/ER if you develop: worsening or unrelieved shortness of breath, heart palpitations, chest pain, dehydration

## 2023-08-14 DIAGNOSIS — E66.9 OBESITY (BMI 30-39.9): ICD-10-CM

## 2023-08-14 DIAGNOSIS — Z51.81 ENCOUNTER FOR THERAPEUTIC DRUG MONITORING: ICD-10-CM

## 2023-09-05 ENCOUNTER — PATIENT MESSAGE (OUTPATIENT)
Dept: OBGYN CLINIC | Facility: CLINIC | Age: 48
End: 2023-09-05

## 2023-09-05 DIAGNOSIS — Z12.31 ENCOUNTER FOR SCREENING MAMMOGRAM FOR MALIGNANT NEOPLASM OF BREAST: Primary | ICD-10-CM

## 2023-09-05 NOTE — TELEPHONE ENCOUNTER
From: Evert Thomas  To: Esperanza Garcia. DO Ayesha  Sent: 9/5/2023 11:12 AM CDT  Subject: Jessica Joy,    I received a reminder that I am due for a mammogram. Do I need the 3D and also can you put an order through so I can schedule?     Thanks,  Jorge Huston

## 2023-09-20 RX ORDER — OMEPRAZOLE 40 MG/1
40 CAPSULE, DELAYED RELEASE ORAL DAILY
Qty: 30 CAPSULE | Refills: 0 | OUTPATIENT
Start: 2023-09-20

## 2023-09-20 NOTE — TELEPHONE ENCOUNTER
To FD--    Please call patient to schedule OV. She opened mychart and has still not scheduled. Last physical 3/22    Back to refill once scheduled. Thanks!

## 2023-09-26 ENCOUNTER — HOSPITAL ENCOUNTER (OUTPATIENT)
Age: 48
Discharge: HOME | End: 2023-09-26

## 2023-09-26 VITALS
RESPIRATION RATE: 20 BRPM | DIASTOLIC BLOOD PRESSURE: 71 MMHG | TEMPERATURE: 98 F | OXYGEN SATURATION: 100 % | SYSTOLIC BLOOD PRESSURE: 120 MMHG | HEART RATE: 63 BPM

## 2023-09-26 DIAGNOSIS — J01.90 ACUTE NON-RECURRENT SINUSITIS, UNSPECIFIED LOCATION: Primary | ICD-10-CM

## 2023-09-26 PROCEDURE — 99213 OFFICE O/P EST LOW 20 MIN: CPT

## 2023-09-26 RX ORDER — OMEPRAZOLE 40 MG/1
40 CAPSULE, DELAYED RELEASE ORAL DAILY
Qty: 90 CAPSULE | Refills: 0 | Status: SHIPPED | OUTPATIENT
Start: 2023-09-26

## 2023-09-26 RX ORDER — AMOXICILLIN AND CLAVULANATE POTASSIUM 875; 125 MG/1; MG/1
1 TABLET, FILM COATED ORAL 2 TIMES DAILY
Qty: 20 TABLET | Refills: 0 | Status: SHIPPED | OUTPATIENT
Start: 2023-09-26 | End: 2023-10-06

## 2023-09-26 RX ORDER — BENZONATATE 100 MG/1
100 CAPSULE ORAL 3 TIMES DAILY PRN
Qty: 30 CAPSULE | Refills: 0 | Status: SHIPPED | OUTPATIENT
Start: 2023-09-26 | End: 2023-10-26

## 2023-09-26 NOTE — ED INITIAL ASSESSMENT (HPI)
Cough for 3 weeks, sinus pressure and congestion, post nasal drip, ears feels clogged , no fever, had tele visit

## 2023-09-26 NOTE — TELEPHONE ENCOUNTER
Pt due for visit. Has appt scheduled    Refill request is for a maintenance medication and has met the criteria specified in the Ambulatory Medication Refill Standing Order for eligibility, visits, laboratory, alerts and was sent to the requested pharmacy. Requested Prescriptions     Signed Prescriptions Disp Refills    Omeprazole 40 MG Oral Capsule Delayed Release 90 capsule 0     Sig: Take 1 capsule (40 mg total) by mouth daily.      Authorizing Provider: Gerald Bright     Ordering User: Remi Fowler

## 2023-09-26 NOTE — DISCHARGE INSTRUCTIONS
Please take the antibiotics as prescribed for sinusitis. I also sent you a prescription for Tessalon Perles for your cough, please remember these may make you drowsy so do not drive or drink alcohol when you take them. Please also continue your nasal inhaler and use Mucinex for nasal congestion. May also use over the counter decongestants. You can also try using a Yaneli pot/saline rinses for congestion. Use Tylenol or Motrin for pain or fever. If you develop any shortness of breath, chest pain, severe headache, fever that does not resolve with medications or any other worsening or concerning symptoms you should go to the emergency department. Otherwise follow-up with your primary care doctor.

## 2023-10-25 DIAGNOSIS — E66.9 OBESITY (BMI 30-39.9): ICD-10-CM

## 2023-10-25 RX ORDER — SEMAGLUTIDE 0.25 MG/.5ML
0.25 INJECTION, SOLUTION SUBCUTANEOUS WEEKLY
Qty: 4 EACH | Refills: 1 | Status: SHIPPED | OUTPATIENT
Start: 2023-10-25

## 2023-10-26 ENCOUNTER — HOSPITAL ENCOUNTER (OUTPATIENT)
Dept: MAMMOGRAPHY | Age: 48
Discharge: HOME | End: 2023-10-26
Attending: OBSTETRICS & GYNECOLOGY

## 2023-10-26 ENCOUNTER — TELEPHONE (OUTPATIENT)
Dept: SURGERY | Facility: CLINIC | Age: 48
End: 2023-10-26

## 2023-10-26 DIAGNOSIS — Z12.31 ENCOUNTER FOR SCREENING MAMMOGRAM FOR MALIGNANT NEOPLASM OF BREAST: ICD-10-CM

## 2023-10-26 PROCEDURE — 77067 SCR MAMMO BI INCL CAD: CPT | Performed by: OBSTETRICS & GYNECOLOGY

## 2023-10-26 PROCEDURE — 77063 BREAST TOMOSYNTHESIS BI: CPT | Performed by: OBSTETRICS & GYNECOLOGY

## 2023-11-01 ENCOUNTER — HOSPITAL ENCOUNTER (OUTPATIENT)
Age: 48
Discharge: HOME | End: 2023-11-01
Payer: COMMERCIAL

## 2023-11-01 VITALS
SYSTOLIC BLOOD PRESSURE: 116 MMHG | OXYGEN SATURATION: 100 % | TEMPERATURE: 98 F | DIASTOLIC BLOOD PRESSURE: 84 MMHG | HEART RATE: 82 BPM | RESPIRATION RATE: 18 BRPM

## 2023-11-01 DIAGNOSIS — R05.9 COUGH: Primary | ICD-10-CM

## 2023-11-01 DIAGNOSIS — U07.1 COVID-19: ICD-10-CM

## 2023-11-01 LAB
S PYO AG THROAT QL: NEGATIVE
SARS-COV-2 RNA RESP QL NAA+PROBE: DETECTED

## 2023-11-01 PROCEDURE — 99213 OFFICE O/P EST LOW 20 MIN: CPT | Performed by: PHYSICIAN ASSISTANT

## 2023-11-01 PROCEDURE — U0002 COVID-19 LAB TEST NON-CDC: HCPCS | Performed by: PHYSICIAN ASSISTANT

## 2023-11-01 PROCEDURE — 87880 STREP A ASSAY W/OPTIC: CPT | Performed by: PHYSICIAN ASSISTANT

## 2023-11-01 RX ORDER — PSEUDOEPHEDRINE HCL 30 MG
30 TABLET ORAL EVERY 4 HOURS PRN
Qty: 24 TABLET | Refills: 0 | Status: SHIPPED | OUTPATIENT
Start: 2023-11-01

## 2023-11-01 RX ORDER — BENZONATATE 100 MG/1
100 CAPSULE ORAL 3 TIMES DAILY PRN
Qty: 30 CAPSULE | Refills: 0 | Status: SHIPPED | OUTPATIENT
Start: 2023-11-01 | End: 2023-12-01

## 2023-11-01 NOTE — DISCHARGE INSTRUCTIONS
Tessalon as needed for cough   Alternate Tylenol/Motrin every 3 hours as needed for pain or fever > 100.4 degrees   Drink plenty of fluids  Get plenty of rest    Wash hands often  Disinfect your environment  Do not share utensils or drinks    You may benefit from taking a decongestant (e.g. Sudafed)  You may benefit from taking a daily allergy medication (e.g. Zyrtec)  You may benefit from using a humidifier    Stay home and isolate from others in your home for 5 days (starting when your symptoms began)  Isolation can end after 5 days if you are fever-free (<100.4) for 24 hours and your symptoms are improving  Wear a mask any time you are around others for 10 days    Symptoms may take a few weeks to resolve  Follow up with your primary care provider

## 2023-11-02 RX ORDER — BECLOMETHASONE DIPROPIONATE 80 UG/1
2 AEROSOL, METERED NASAL DAILY
Qty: 31.8 G | Refills: 1 | Status: SHIPPED | OUTPATIENT
Start: 2023-11-02

## 2023-11-02 NOTE — TELEPHONE ENCOUNTER
Refill requested for   Requested Prescriptions   Pending Prescriptions Disp Refills    QNASL 80 MCG/ACT Nasal Aero Soln [Pharmacy Med Name: QNASL 80MCG NASAL AEROSOL 120SPRAYS] 31.8 g 0     Sig: USE 2 SPRAYS IN EACH NOSTRIL DAILY       Antihistamines Passed - 11/2/2023  9:39 AM        Passed - Appt in past 12 mos or next 1 mos     Recent Outpatient Visits              3 months ago Acute frontal sinusitis, recurrence not specified    [place], [address], [place]    Office Visit    3 months ago Viral URI with cough    [facility], [address], [address], [place], APRN    Office Visit    6 months ago Hypercholesterolemia    [address], [address], [place]al, APRN    Office Visit    7 months ago     [facility]    Nurse Only    8 months ago Other chronic sinusitis    [address], [place] Cris[place]da Quiroz MD    Office Visit          Future Appointments         Provider Department Appt Notes    In 1 week Crystal Hunter MD [facility] Annual Visit    In 3 months [address] [facility] [address], [address] Hormones and feelings bloated/endometriosis. last px 10/26/22                          Last office visit: 2/27/23    Previously advised to follow up in No follow-up timeline advised in last office visit. Diagnosis of chronic sinusitis and AR advised to follow-up in 1 year    F/U currently scheduled?  Not at this time    ACTION: Refill filled per protocol

## 2023-11-28 RX ORDER — ALPRAZOLAM 2 MG/1
TABLET ORAL
Qty: 1 TABLET | Refills: 0 | OUTPATIENT
Start: 2023-11-28

## 2023-11-28 NOTE — TELEPHONE ENCOUNTER
Requested Prescriptions     Pending Prescriptions Disp Refills    ALPRAZOLAM 2 MG Oral Tab [Pharmacy Med Name: ALPRAZOLAM 2MG TABLETS] 1 tablet 0     Sig: TAKE 1 TABLET BY MOUTH FOR 1 DOSE BEFORE MRI       Last OV: 9/21/23 - telemed  Next OV: None

## 2023-12-07 ENCOUNTER — PATIENT MESSAGE (OUTPATIENT)
Dept: OBGYN CLINIC | Facility: CLINIC | Age: 48
End: 2023-12-07

## 2023-12-07 NOTE — TELEPHONE ENCOUNTER
RN spoke with pt to clarify her symptoms reported in [place]. Pt reports soaking a ultra tampon hourly, and passing clots that are larger than a golf ball. Pt advised to go to ER to be evaluated. Pt informed that going to an urgent care would not be appropriate at this time, since she needs to be evaluated urgently and the ER will be able to provide any necessary testing and immediate intervention in case of any major blood loss. Pt expressed she is worried about the long wait times and has to take care of her kids. RN sympathized with pt and reiterated the importance of being evaluated in the ER. Pt agreed. Pt also advised to stay well hydrated with at 64 oz of water to avoid feeling faint or lightheaded. Pt verbalized understanding. Message to Eddie Nguyenserick Duran as MARIPOSA and for sign off. Thank you.

## 2023-12-07 NOTE — TELEPHONE ENCOUNTER
From: Gretchen Speaker  To: Sagrario Hodge  Sent: 12/7/2023 12:33 PM CST  Subject: Blood Clots    Hi Dr. Lalita Mckeon,  I was out of town and I got my period 7 days early, For the most part, my period has been regular. I started to pass clots (I have never passed clots like this before) and I am going through an Ultra tampon about every hour to hour and a half-each time with clots larger than the size of a golf ball. This has been going on for four days. Do you have any suggestions.  I have been bleeding through my pants and this is really different from anything I have experienced

## 2024-01-30 ENCOUNTER — OFFICE VISIT (OUTPATIENT)
Dept: INTERNAL MEDICINE CLINIC | Facility: CLINIC | Age: 49
End: 2024-01-30
Payer: COMMERCIAL

## 2024-01-30 VITALS
HEART RATE: 69 BPM | OXYGEN SATURATION: 98 % | DIASTOLIC BLOOD PRESSURE: 70 MMHG | WEIGHT: 207 LBS | BODY MASS INDEX: 31.37 KG/M2 | HEIGHT: 68 IN | SYSTOLIC BLOOD PRESSURE: 110 MMHG

## 2024-01-30 DIAGNOSIS — F41.9 ANXIETY: ICD-10-CM

## 2024-01-30 DIAGNOSIS — K21.00 GASTROESOPHAGEAL REFLUX DISEASE WITH ESOPHAGITIS WITHOUT HEMORRHAGE: ICD-10-CM

## 2024-01-30 DIAGNOSIS — Z76.89 ESTABLISHING CARE WITH NEW DOCTOR, ENCOUNTER FOR: Primary | ICD-10-CM

## 2024-01-30 DIAGNOSIS — Z85.72 HISTORY OF NON-HODGKIN'S LYMPHOMA: ICD-10-CM

## 2024-01-30 PROCEDURE — 99386 PREV VISIT NEW AGE 40-64: CPT | Performed by: INTERNAL MEDICINE

## 2024-01-30 PROCEDURE — 3008F BODY MASS INDEX DOCD: CPT | Performed by: INTERNAL MEDICINE

## 2024-01-30 PROCEDURE — 3078F DIAST BP <80 MM HG: CPT | Performed by: INTERNAL MEDICINE

## 2024-01-30 PROCEDURE — 3074F SYST BP LT 130 MM HG: CPT | Performed by: INTERNAL MEDICINE

## 2024-01-30 RX ORDER — OMEPRAZOLE 40 MG/1
40 CAPSULE, DELAYED RELEASE ORAL DAILY
Qty: 90 CAPSULE | Refills: 3 | Status: SHIPPED | OUTPATIENT
Start: 2024-01-30

## 2024-01-30 RX ORDER — POLYETHYLENE GLYCOL 3350 17 G/17G
1 POWDER, FOR SOLUTION ORAL DAILY
Qty: 72 EACH | Refills: 11 | Status: SHIPPED | OUTPATIENT
Start: 2024-01-30

## 2024-01-30 NOTE — PROGRESS NOTES
Subjective:   Indiana Boudreaux is a 48 year old female who presents for Establish Care    Patient here to establish care.    Med hx - colon stricture post radiation rx  from lymphoma radiation, endometriosis. Phil, Gerd, and environmental allergies.  History/Other:    Chief Complaint Reviewed and Verified  No Further Nursing Notes to   Review  Allergies Reviewed  Medications Reviewed  Problem List Reviewed           Tobacco:  She smoked tobacco in the past but quit greater than 12 months ago.  Social History    Tobacco Use      Smoking status: Former        Types: Cigarettes        Start date: 1996        Quit date: 2006        Years since quittin.7      Smokeless tobacco: Former      Tobacco comments: No household smokers.        Current Outpatient Medications   Medication Sig Dispense Refill    Omeprazole 40 MG Oral Capsule Delayed Release Take 1 capsule (40 mg total) by mouth daily. 90 capsule 0    Beclomethasone Dipropionate (QNASL) 80 MCG/ACT Nasal Aero Soln USE 2 SPRAYS IN EACH NOSTRIL DAILY 31.8 g 1    pseudoephedrine 30 MG Oral Tab Take 1 tablet (30 mg total) by mouth every 4 (four) hours as needed for congestion. 24 tablet 0    semaglutide-weight management (WEGOVY) 0.25 MG/0.5ML Subcutaneous Solution Auto-injector Inject 0.5 mL (0.25 mg total) into the skin once a week. 4 each 1    albuterol (PROAIR HFA) 108 (90 Base) MCG/ACT Inhalation Aero Soln Inhale 2 puffs into the lungs every 4 (four) hours as needed for Wheezing. 1 each 0    levocetirizine 5 MG Oral Tab Take 0.5 tablets (2.5 mg total) by mouth every evening. 30 tablet 5    Levocetirizine Dihydrochloride 2.5 MG/5ML Oral Solution Take 5 mL (2.5 mg total) by mouth every evening. 3 each 2    TOPIRAMATE 25 MG Oral Tab TAKE 1 TABLET(25 MG) BY MOUTH TWICE DAILY 180 tablet 0    escitalopram 10 MG Oral Tab Take 1 tablet (10 mg total) by mouth daily.      ALPRAZolam (XANAX) 0.25 MG Oral Tab Take 1 tablet (0.25 mg total) by mouth 2 (two)  times daily as needed for Anxiety. 30 tablet 0    buPROPion 150 MG Oral Tablet 24 Hr Take 1 tablet (150 mg total) by mouth daily.           Review of Systems:  Review of Systems   Constitutional:  Negative for unexpected weight change.   Respiratory: Negative.     Gastrointestinal:         Gerd   Genitourinary: Negative.    Musculoskeletal: Negative.    Neurological:  Negative for numbness.   Psychiatric/Behavioral:  The patient is nervous/anxious.          Objective:   /70   Pulse 69   Ht 5' 8\" (1.727 m)   Wt 207 lb (93.9 kg)   LMP 06/14/2023 (Exact Date)   SpO2 98%   BMI 31.47 kg/m²  Estimated body mass index is 31.47 kg/m² as calculated from the following:    Height as of this encounter: 5' 8\" (1.727 m).    Weight as of this encounter: 207 lb (93.9 kg).  Physical Exam  HENT:      Head: Normocephalic and atraumatic.      Right Ear: Ear canal normal.      Left Ear: Ear canal normal.   Eyes:      General: No scleral icterus.     Pupils: Pupils are equal, round, and reactive to light.   Cardiovascular:      Rate and Rhythm: Normal rate and regular rhythm.   Pulmonary:      Effort: Pulmonary effort is normal.      Breath sounds: Normal breath sounds.   Abdominal:      Palpations: Abdomen is soft.      Tenderness: There is no abdominal tenderness.   Musculoskeletal:      Cervical back: Neck supple.      Right lower leg: No edema.      Left lower leg: No edema.   Neurological:      Mental Status: She is alert. Mental status is at baseline.   Psychiatric:         Thought Content: Thought content normal.           Assessment & Plan:   1. Establishing care with new doctor, encounter for (Primary) up to date on blood work up  2. Anxiety sees psychiatry   3. Gastroesophageal reflux disease with esophagitis without hemorrhage on Omeprazole  4. History of non-Hodgkin's lymphoma - annual fu with oncology at [facility]        No follow-ups on file.    Peggy Norwood MD, 1/30/2024, 2:42 PM

## 2024-02-13 ENCOUNTER — OFFICE VISIT (OUTPATIENT)
Dept: OBGYN CLINIC | Facility: CLINIC | Age: 49
End: 2024-02-13
Payer: COMMERCIAL

## 2024-02-13 VITALS
WEIGHT: 208.19 LBS | HEART RATE: 58 BPM | DIASTOLIC BLOOD PRESSURE: 73 MMHG | SYSTOLIC BLOOD PRESSURE: 107 MMHG | BODY MASS INDEX: 32 KG/M2

## 2024-02-13 DIAGNOSIS — Z12.31 SCREENING MAMMOGRAM FOR BREAST CANCER: ICD-10-CM

## 2024-02-13 DIAGNOSIS — Z01.419 ENCOUNTER FOR GYNECOLOGICAL EXAMINATION WITHOUT ABNORMAL FINDING: Primary | ICD-10-CM

## 2024-02-13 PROCEDURE — 3074F SYST BP LT 130 MM HG: CPT | Performed by: OBSTETRICS & GYNECOLOGY

## 2024-02-13 PROCEDURE — 3078F DIAST BP <80 MM HG: CPT | Performed by: OBSTETRICS & GYNECOLOGY

## 2024-02-13 PROCEDURE — 99396 PREV VISIT EST AGE 40-64: CPT | Performed by: OBSTETRICS & GYNECOLOGY

## 2024-03-20 NOTE — PROGRESS NOTES
Subjective:   Patient ID: Indiana Boudreaux is a 48 year old female.    HPI Priscila is  with menses monthly on Mirena. This changed last year. She skipped a single cycle and then very heavy in December while on trip in [facility]. Back to normal in January. New family hx with Mom's bladder CA / cystectomy at [facility]. Dad with Myasthenia Gravis at age 80. Pilates 2/ week.     History/Other:   Review of Systems   Constitutional:  Negative for appetite change, fatigue and unexpected weight change.   Eyes:  Negative for visual disturbance.   Respiratory:  Negative for cough and shortness of breath.    Cardiovascular:  Negative for chest pain, palpitations and leg swelling.   Gastrointestinal:  Negative for abdominal distention, abdominal pain, blood in stool, constipation and diarrhea.   Genitourinary:  Positive for vaginal discharge (intermittent and odorous). Negative for dyspareunia, dysuria, frequency, menstrual problem, pelvic pain and urgency.   Musculoskeletal:  Negative for arthralgias and myalgias.   Skin:  Negative for rash.   Neurological:  Positive for headaches (Migraines 2/ month). Negative for weakness and numbness.   Psychiatric/Behavioral:  Negative for dysphoric mood. The patient is not nervous/anxious.      Current Outpatient Medications   Medication Sig Dispense Refill    Omeprazole 40 MG Oral Capsule Delayed Release Take 1 capsule (40 mg total) by mouth daily. 90 capsule 3    Beclomethasone Dipropionate (QNASL) 80 MCG/ACT Nasal Aero Soln USE 2 SPRAYS IN EACH NOSTRIL DAILY 31.8 g 1    TOPIRAMATE 25 MG Oral Tab TAKE 1 TABLET(25 MG) BY MOUTH TWICE DAILY 180 tablet 0    escitalopram 10 MG Oral Tab Take 1 tablet (10 mg total) by mouth daily.      ALPRAZolam (XANAX) 0.25 MG Oral Tab Take 1 tablet (0.25 mg total) by mouth 2 (two) times daily as needed for Anxiety. 30 tablet 0    buPROPion 150 MG Oral Tablet 24 Hr Take 1 tablet (150 mg total) by mouth daily.      Polyethylene Glycol 3350 4 g Oral Powd Pack Take 1  each by mouth daily. (Patient not taking: Reported on 2/13/2024) 72 each 11    pseudoephedrine 30 MG Oral Tab Take 1 tablet (30 mg total) by mouth every 4 (four) hours as needed for congestion. 24 tablet 0    semaglutide-weight management (WEGOVY) 0.25 MG/0.5ML Subcutaneous Solution Auto-injector Inject 0.5 mL (0.25 mg total) into the skin once a week. 4 each 1    albuterol (PROAIR HFA) 108 (90 Base) MCG/ACT Inhalation Aero Soln Inhale 2 puffs into the lungs every 4 (four) hours as needed for Wheezing. (Patient not taking: Reported on 2/13/2024) 1 each 0    levocetirizine 5 MG Oral Tab Take 0.5 tablets (2.5 mg total) by mouth every evening. 30 tablet 5    Levocetirizine Dihydrochloride 2.5 MG/5ML Oral Solution Take 5 mL (2.5 mg total) by mouth every evening. 3 each 2     Allergies:  Allergies   Allergen Reactions    Sulfa Antibiotics HIVES       Objective:   Physical Exam  Constitutional:       General: She is not in acute distress.     Appearance: She is well-developed. She is not diaphoretic.   Neck:      Thyroid: No thyromegaly.   Cardiovascular:      Rate and Rhythm: Normal rate and regular rhythm.      Heart sounds: Normal heart sounds. No murmur heard.  Pulmonary:      Effort: Pulmonary effort is normal.      Breath sounds: Normal breath sounds. No wheezing or rales.   Chest:   Breasts:     Right: Normal. No mass, nipple discharge, skin change or tenderness.      Left: Normal. No mass, nipple discharge, skin change or tenderness.      Comments:     Abdominal:      General: There is no distension.      Palpations: Abdomen is soft. There is no mass.      Tenderness: There is no abdominal tenderness. There is no guarding or rebound.   Genitourinary:     Labia:         Right: No rash or lesion.         Left: No rash or lesion.       Vagina: Normal. No vaginal discharge.      Cervix: No cervical motion tenderness or discharge.      Uterus: Not enlarged and not tender.       Adnexa:         Right: No mass,  tenderness or fullness.          Left: No mass, tenderness or fullness.     Musculoskeletal:         General: No tenderness.      Cervical back: Neck supple.   Lymphadenopathy:      Cervical: No cervical adenopathy.      Upper Body:      Right upper body: No supraclavicular, axillary or pectoral adenopathy.      Left upper body: No supraclavicular, axillary or pectoral adenopathy.   Neurological:      Mental Status: She is alert.         Assessment & Plan:   1. Encounter for gynecological examination without abnormal finding    2. Screening mammogram for breast cancer        No orders of the defined types were placed in this encounter.      Meds This Visit:  Requested Prescriptions      No prescriptions requested or ordered in this encounter       Imaging & Referrals:  None

## 2024-04-02 ENCOUNTER — OFFICE VISIT (OUTPATIENT)
Dept: FAMILY MEDICINE CLINIC | Facility: CLINIC | Age: 49
End: 2024-04-02
Payer: COMMERCIAL

## 2024-04-02 VITALS
WEIGHT: 198 LBS | HEART RATE: 74 BPM | SYSTOLIC BLOOD PRESSURE: 106 MMHG | TEMPERATURE: 99 F | OXYGEN SATURATION: 98 % | HEIGHT: 68 IN | RESPIRATION RATE: 14 BRPM | BODY MASS INDEX: 30.01 KG/M2 | DIASTOLIC BLOOD PRESSURE: 66 MMHG

## 2024-04-02 DIAGNOSIS — J02.9 SORE THROAT: ICD-10-CM

## 2024-04-02 DIAGNOSIS — J11.1 INFLUENZA-LIKE ILLNESS: Primary | ICD-10-CM

## 2024-04-02 PROCEDURE — 87880 STREP A ASSAY W/OPTIC: CPT | Performed by: PHYSICIAN ASSISTANT

## 2024-04-02 PROCEDURE — 3078F DIAST BP <80 MM HG: CPT | Performed by: PHYSICIAN ASSISTANT

## 2024-04-02 PROCEDURE — 3074F SYST BP LT 130 MM HG: CPT | Performed by: PHYSICIAN ASSISTANT

## 2024-04-02 PROCEDURE — 87637 SARSCOV2&INF A&B&RSV AMP PRB: CPT | Performed by: PHYSICIAN ASSISTANT

## 2024-04-02 PROCEDURE — 99213 OFFICE O/P EST LOW 20 MIN: CPT | Performed by: PHYSICIAN ASSISTANT

## 2024-04-02 PROCEDURE — 3008F BODY MASS INDEX DOCD: CPT | Performed by: PHYSICIAN ASSISTANT

## 2024-04-02 RX ORDER — OSELTAMIVIR PHOSPHATE 75 MG/1
75 CAPSULE ORAL 2 TIMES DAILY
Qty: 10 CAPSULE | Refills: 0 | Status: SHIPPED | OUTPATIENT
Start: 2024-04-02 | End: 2024-04-07

## 2024-04-02 RX ORDER — LISDEXAMFETAMINE DIMESYLATE 20 MG/1
20 CAPSULE ORAL DAILY
COMMUNITY
Start: 2024-02-28

## 2024-04-02 NOTE — PROGRESS NOTES
Christen COMPLAINT:     Chief Complaint   Patient presents with    Upper Respiratory Infection     Post nasal drip for 1 week,. Change in sx on Sunday, +body aches, chills, sore throat, pain with swallowing, rare cough       HPI:   Indiana Boudreaux is a 48 year old female who presents for upper respiratory symptoms which began with some PND/throat irritation while in [place] for Spring Break last week. On Easter Jimmy 3/31/24 ( 2 days ago) sx changed.  She began feeling unwell, achy, chilled, had GI upset with some vomiting, sore throat/pain with swallowing, pressure in ears and occasional cough. Symptoms have been progressing since onset.  Treating symptoms with Tylenol, Advil which helps for short period of time. Mucinex.  Son ill with similar but less severe sx.  Of note, patient with chronic sinusitis hx.     Current Outpatient Medications   Medication Sig Dispense Refill    VYVANSE 20 MG Oral Cap Take 1 capsule (20 mg total) by mouth daily.      Omeprazole 40 MG Oral Capsule Delayed Release Take 1 capsule (40 mg total) by mouth daily. 90 capsule 3    Polyethylene Glycol 3350 4 g Oral Powd Pack Take 1 each by mouth daily. (Patient not taking: Reported on 2/13/2024) 72 each 11    Beclomethasone Dipropionate (QNASL) 80 MCG/ACT Nasal Aero Soln USE 2 SPRAYS IN EACH NOSTRIL DAILY 31.8 g 1    albuterol (PROAIR HFA) 108 (90 Base) MCG/ACT Inhalation Aero Soln Inhale 2 puffs into the lungs every 4 (four) hours as needed for Wheezing. (Patient not taking: Reported on 2/13/2024) 1 each 0    levocetirizine 5 MG Oral Tab Take 0.5 tablets (2.5 mg total) by mouth every evening. 30 tablet 5    TOPIRAMATE 25 MG Oral Tab TAKE 1 TABLET(25 MG) BY MOUTH TWICE DAILY 180 tablet 0    escitalopram 10 MG Oral Tab Take 1 tablet (10 mg total) by mouth daily.      ALPRAZolam (XANAX) 0.25 MG Oral Tab Take 1 tablet (0.25 mg total) by mouth 2 (two) times daily as needed for Anxiety. 30 tablet 0    buPROPion 150 MG Oral Tablet 24 Hr Take 1  tablet (150 mg total) by mouth daily.        Past Medical History:   Diagnosis Date    Anxiety state, unspecified     Chronic maxillary sinusitis     Deviated nasal septum     Endometriosis     Ethmoid sinusitis     Exposure to radiation     GERD (gastroesophageal reflux disease)     H/O dilation and curettage 2017    Hypertrophy of nasal turbinates     Non Hodgkin's lymphoma (HCC) 2005    treated with localized RT    Seasonal allergies       Past Surgical History:   Procedure Laterality Date    EXCISION TURBINATE,SUBMUCOUS      NASAL SCOPY,REMV PART ETHMOID      NASAL SCOPY,RMV TISS MAXILL SINUS      OTHER Left 2005    excision groin lymph node    OTHER  2010    ectopic pregnancy     REDUCTION LEFT  2000    REDUCTION LEFT  2002    REDUCTION RIGHT  2000    REDUCTION RIGHT  2002    REPAIR OF NASAL SEPTUM           Social History     Socioeconomic History    Marital status:    Tobacco Use    Smoking status: Former     Types: Cigarettes     Start date: 1996     Quit date: 2006     Years since quittin.8    Smokeless tobacco: Former    Tobacco comments:     No household smokers.    Vaping Use    Vaping Use: Never used   Substance and Sexual Activity    Alcohol use: Yes     Alcohol/week: 4.0 standard drinks of alcohol     Types: 4 Glasses of wine per week     Comment: Social     Drug use: No    Sexual activity: Yes     Partners: Male     Birth control/protection: I.U.D.   Other Topics Concern    Pt has a pacemaker No    Pt has a defibrillator No    Reaction to local anesthetic No         REVIEW OF SYSTEMS:   GENERAL: low appetite  SKIN: no rashes or abnormal skin lesions  HEENT: See HPI  LUNGS: See HPI  CARDIOVASCULAR: denies chest pain or palpitations   GI: see HPI      EXAM:   /66   Pulse 74   Temp 99.1 °F (37.3 °C)   Resp 14   Ht 5' 8\" (1.727 m)   Wt 198 lb (89.8 kg)   LMP 2024 (Exact Date)   SpO2 98%   BMI 30.11 kg/m²   GENERAL: well developed, well nourished,in no apparent  distress  SKIN: no rashes,no suspicious lesions  HEAD: atraumatic, normocephalic.    EYES: conjunctiva clear, EOM intact  EARS: TM's non injected, no bulging, no retraction,no fluid, bony landmarks visible  NOSE: Nostrils patent, minimal nasal discharge, nasal mucosa reddened, + chronic perforation of nasal septum   THROAT: Oral mucosa pink, moist. Posterior pharynx is moderately erythematous. No exudates. Tonsils 0/4.  + cobblestoning  NECK: Supple, non-tender  LUNGS: clear to auscultation bilaterally, no wheezes or rhonchi. Breathing is non labored.  CARDIO: RRR without murmur  EXTREMITIES: no cyanosis, clubbing or edema  LYMPH:  No cervical or submandibular lymphadenopathy.      Recent Results (from the past 24 hour(s))   Rapid Strep    Collection Time: 04/02/24  1:46 PM   Result Value Ref Range    Strep Grp A Screen negative Negative    Control Line Present with a clear background (yes/no) yes Yes/No    Kit Lot # [id number] Numeric    Kit Expiration Date 3/1/25 Date         ASSESSMENT AND PLAN:   Indiana Boudreaux is a 48 year old female who presents with upper respiratory symptoms that are consistent with    ASSESSMENT:   Encounter Diagnoses   Name Primary?    Influenza-like illness Yes    Sore throat        PLAN: Suspect initial sx were allergies due to different environment. Change in symptoms on Sunday suspect new onset viral infection, likely influenza. Will empirically treat while awaiting quad panel results.  Meds as below.  Comfort care as described in Patient Instructions.  Advised if quad panel is negative and sinus sx become more problematic would consider treatment over the phone.     Meds & Refills for this Visit:  Requested Prescriptions     Signed Prescriptions Disp Refills    oseltamivir (TAMIFLU) 75 MG Oral Cap 10 capsule 0     Sig: Take 1 capsule (75 mg total) by mouth 2 (two) times daily for 5 days.     Risks, benefits, and side effects of medication explained and discussed.    The patient  indicates understanding of these issues and agrees to the plan.  The patient is asked to f/u with PCP if sx's persist or worsen.

## 2024-04-02 NOTE — PATIENT INSTRUCTIONS
Rest as much as possible   Drink lots of fluids-- aim to drink at least 60 ounces of water in addition to electrolyte replacement drinks.   You may take Ibuprofen 600 mg every 6 hours with a little food and you may add Acetaminophen 650 mg every 4 hours as needed for fever control and body aches.   Throat lozenges or hard candies to soothe sore throat and lessen cough   1 tsp of honey as needed to help coat throat and suppress cough   Warm salt water gargles to sooth sore throat   You can use your preferred OTC cold/cough medication just be sure to check the ingredients so you are not taking too much acetaminophen or ibuprofen.   If you have significant nasal congestion and no history of blood pressure issues you can take Sudafed (pseudoephedrine) 4-6 hours formulation behind the pharmacy counter

## 2024-04-03 LAB
FLUAV + FLUBV RNA SPEC NAA+PROBE: DETECTED
FLUAV + FLUBV RNA SPEC NAA+PROBE: NOT DETECTED
RSV RNA SPEC NAA+PROBE: NOT DETECTED
SARS-COV-2 RNA RESP QL NAA+PROBE: NOT DETECTED

## 2024-04-06 ENCOUNTER — OFFICE VISIT (OUTPATIENT)
Dept: ALLERGY | Facility: CLINIC | Age: 49
End: 2024-04-06
Payer: COMMERCIAL

## 2024-04-06 VITALS — OXYGEN SATURATION: 97 % | HEART RATE: 78 BPM

## 2024-04-06 DIAGNOSIS — H10.10 SEASONAL AND PERENNIAL ALLERGIC RHINOCONJUNCTIVITIS: ICD-10-CM

## 2024-04-06 DIAGNOSIS — J30.89 SEASONAL AND PERENNIAL ALLERGIC RHINOCONJUNCTIVITIS: ICD-10-CM

## 2024-04-06 DIAGNOSIS — J30.2 SEASONAL AND PERENNIAL ALLERGIC RHINOCONJUNCTIVITIS: ICD-10-CM

## 2024-04-06 DIAGNOSIS — Z23 FLU VACCINE NEED: ICD-10-CM

## 2024-04-06 DIAGNOSIS — Z92.29 COVID-19 VACCINE SERIES COMPLETED: ICD-10-CM

## 2024-04-06 DIAGNOSIS — J32.8 OTHER CHRONIC SINUSITIS: Primary | ICD-10-CM

## 2024-04-06 PROCEDURE — 99214 OFFICE O/P EST MOD 30 MIN: CPT | Performed by: ALLERGY & IMMUNOLOGY

## 2024-04-06 RX ORDER — ALBUTEROL SULFATE 90 UG/1
2 AEROSOL, METERED RESPIRATORY (INHALATION) EVERY 4 HOURS PRN
Qty: 1 EACH | Refills: 0 | Status: SHIPPED | OUTPATIENT
Start: 2024-04-06

## 2024-04-06 RX ORDER — BECLOMETHASONE DIPROPIONATE 80 UG/1
2 AEROSOL, METERED NASAL DAILY
Qty: 31.8 G | Refills: 2 | Status: SHIPPED | OUTPATIENT
Start: 2024-04-06

## 2024-04-06 RX ORDER — LEVOCETIRIZINE DIHYDROCHLORIDE 5 MG/1
2.5 TABLET, FILM COATED ORAL EVERY EVENING
Qty: 90 TABLET | Refills: 2 | Status: SHIPPED | OUTPATIENT
Start: 2024-04-06

## 2024-04-06 NOTE — PROGRESS NOTES
Patient is a 48-year-old female who presents for follow-up with a chief complaint of allergies  Indiana Boudreaux is a 48 year old female.    HPI:     Chief Complaint   Patient presents with    Allergies     Overall pt has felt well controlled. No new symptoms.      Patient last seen by me in February 2023 for chronic sinusitis and allergic rhinitis.  Prior skin testing was positive to dust mites    Medication list include Sudafed Xyzal albuterol Qnasl 80    COVID-vaccine x 3 doses last in 2020  Flu vaccine up-to-date from the fall    Today patient reports    Chronic sinusitis  Denies recurrent sinus infections or antibiotics in the interim or steroids.  No current fevers or mucopurulence      Ar:  Active or persistent symptoms: denies  Active meds: see med  list : xzyal, qnasl   pets :none     Nonsmoker     Flu earlier last week   + cough still   No fevers   Some sob/slight wz   Given alb in past  ,old     Covid oct 2023         HISTORY:  Past Medical History:   Diagnosis Date    Anxiety state, unspecified     Chronic maxillary sinusitis     Deviated nasal septum     Endometriosis     Ethmoid sinusitis     Exposure to radiation     GERD (gastroesophageal reflux disease)     H/O dilation and curettage 12/2017    Hypertrophy of nasal turbinates     Non Hodgkin's lymphoma (HCC) 2005    treated with localized RT    Seasonal allergies       Past Surgical History:   Procedure Laterality Date    EXCISION TURBINATE,SUBMUCOUS      NASAL SCOPY,REMV PART ETHMOID      NASAL SCOPY,RMV TISS MAXILL SINUS      OTHER Left 2005    excision groin lymph node    OTHER  2010    ectopic pregnancy     REDUCTION LEFT  2000    REDUCTION LEFT  2002    REDUCTION RIGHT  2000    REDUCTION RIGHT  2002    REPAIR OF NASAL SEPTUM        Family History   Problem Relation Age of Onset    Cancer Mother 79        bladder; non-smoker    Diabetes Maternal Grandmother     Heart Disorder Maternal Grandfather     Breast Cancer Paternal Grandmother          20s, lived til age 88    Hypertension Half-Brother     Cancer Self 29        non hodgkins lymphoma     Cancer Maternal Aunt 83        brain cancer    Cancer Paternal Uncle         unknown type      Social History:   Social History     Socioeconomic History    Marital status:    Tobacco Use    Smoking status: Former     Types: Cigarettes     Start date: 1996     Quit date: 2006     Years since quittin.9    Smokeless tobacco: Former    Tobacco comments:     No household smokers.    Vaping Use    Vaping Use: Never used   Substance and Sexual Activity    Alcohol use: Yes     Alcohol/week: 4.0 standard drinks of alcohol     Types: 4 Glasses of wine per week     Comment: Social     Drug use: No    Sexual activity: Yes     Partners: Male     Birth control/protection: I.U.D.   Other Topics Concern    Pt has a pacemaker No    Pt has a defibrillator No    Reaction to local anesthetic No        Medications (Active prior to today's visit):  Current Outpatient Medications   Medication Sig Dispense Refill    Beclomethasone Dipropionate (QNASL) 80 MCG/ACT Nasal Aero Soln 2 sprays by Nasal route daily. 31.8 g 2    levocetirizine 5 MG Oral Tab Take 0.5 tablets (2.5 mg total) by mouth every evening. 90 tablet 2    albuterol (PROAIR HFA) 108 (90 Base) MCG/ACT Inhalation Aero Soln Inhale 2 puffs into the lungs every 4 (four) hours as needed for Wheezing. 1 each 0    VYVANSE 20 MG Oral Cap Take 1 capsule (20 mg total) by mouth daily.      oseltamivir (TAMIFLU) 75 MG Oral Cap Take 1 capsule (75 mg total) by mouth 2 (two) times daily for 5 days. 10 capsule 0    Omeprazole 40 MG Oral Capsule Delayed Release Take 1 capsule (40 mg total) by mouth daily. 90 capsule 3    TOPIRAMATE 25 MG Oral Tab TAKE 1 TABLET(25 MG) BY MOUTH TWICE DAILY 180 tablet 0    escitalopram 10 MG Oral Tab Take 1 tablet (10 mg total) by mouth daily.      ALPRAZolam (XANAX) 0.25 MG Oral Tab Take 1 tablet (0.25 mg total) by mouth 2 (two) times daily  as needed for Anxiety. 30 tablet 0    buPROPion 150 MG Oral Tablet 24 Hr Take 1 tablet (150 mg total) by mouth daily.      Polyethylene Glycol 3350 4 g Oral Powd Pack Take 1 each by mouth daily. (Patient not taking: Reported on 2/13/2024) 72 each 11    albuterol (PROAIR HFA) 108 (90 Base) MCG/ACT Inhalation Aero Soln Inhale 2 puffs into the lungs every 4 (four) hours as needed for Wheezing. (Patient not taking: Reported on 2/13/2024) 1 each 0       Allergies:  Allergies   Allergen Reactions    Sulfa Antibiotics HIVES         ROS:   Allergic/Immuno:  See hpi  Cardiovascular:  Negative for irregular heartbeat/palpitations, chest pain, edema  Constitutional:  Negative night sweats,weight loss, irritability and lethargy  ENMT:  Negative for ear drainage, hearing loss see hpi   Eyes:  Negative for eye discharge and vision loss  Gastrointestinal:  Negative for abdominal pain, diarrhea and vomiting  Integumentary:  Negative for pruritus and rash  Respiratory:  Negative for cough, dyspnea and wheezing    PHYSICAL EXAM:   Constitutional: responsive, no acute distress noted  Head/Face: NC/Atraumatic  Eyes/Vision: conjunctiva and lids are normal extraocular motion is intact   Ears/Audiometry: tympanic membranes are normal bilaterally hearing is grossly intact  Nose/Mouth/Throat: nose and throat are clear mucous membranes are moist   Neck/Thyroid: neck is supple without adenopathy  Lymphatic: no abnormal cervical, supraclavicular or axillary adenopathy is noted  Respiratory: normal to inspection lungs are clear to auscultation bilaterally normal respiratory effort   Cardiovascular: regular rate and rhythm no murmurs, gallups, or rubs  Abdomen: soft non-tender non-distended  Skin/Hair: no unusual rashes present   Extremities: no edema, cyanosis, or clubbing     ASSESSMENT/PLAN:   Assessment   Encounter Diagnoses   Name Primary?    Other chronic sinusitis Yes    Seasonal and perennial allergic rhinoconjunctivitis     Flu vaccine  need     COVID-19 vaccine series completed        #1 chronic sinusitis  With sinus infections or antibiotics in the interim.  No current fevers or mucopurulence May continue treatment underlying allergies  May add sinus rinses as needed    2.  Allergic rhinitis  Stable at this time with Qnasl and Xyzal.  Reviewed avoidance measures and potential treatment option immunotherapy    3.  Flu vaccine up-to-date from the fall    4.  COVID-vaccine booster recommended.  Last COVID-vaccine  on record.  Reviewed new booster available since 2023    #5 postinfectious cough.  Flu last week.  Still with some coughing appears more in the chest.  Trial of albuterol 2 puffs every 4-6 hours as needed.  No crackles or wheezing on exam today.  Suspect more postinfectious cough.  Symptomatic care in the meantime.  Albuterol prescription sent         Orders This Visit:  No orders of the defined types were placed in this encounter.      Meds This Visit:  Requested Prescriptions     Signed Prescriptions Disp Refills    Beclomethasone Dipropionate (QNASL) 80 MCG/ACT Nasal Aero Soln 31.8 g 2     Si sprays by Nasal route daily.    levocetirizine 5 MG Oral Tab 90 tablet 2     Sig: Take 0.5 tablets (2.5 mg total) by mouth every evening.    albuterol (PROAIR HFA) 108 (90 Base) MCG/ACT Inhalation Aero Soln 1 each 0     Sig: Inhale 2 puffs into the lungs every 4 (four) hours as needed for Wheezing.       Imaging & Referrals:  None     2024  Dima Brand MD    If medication samples were provided today, they were provided solely for patient education and training related to self administration of these medications.  Teaching, instruction and sample was provided to the patient by myself.  Teaching included  a review of potential adverse side effects as well as potential efficacy.  Patient's questions were answered in regards to medication administration and dosing and potential side effects. Teaching was provided via the  teach back method

## 2024-04-06 NOTE — PATIENT INSTRUCTIONS
#1 chronic sinusitis  With sinus infections or antibiotics in the interim.  No current fevers or mucopurulence May continue treatment underlying allergies  May add sinus rinses as needed    2.  Allergic rhinitis  Stable at this time with Qnasl and Xyzal.  Reviewed avoidance measures and potential treatment option immunotherapy    3.  Flu vaccine up-to-date from the fall    4.  COVID-vaccine booster recommended.  Last COVID-vaccine  on record.  Reviewed new booster available since 2023    #5 postinfectious cough.  Flu last week.  Still with some coughing appears more in the chest.  Trial of albuterol 2 puffs every 4-6 hours as needed.  No crackles or wheezing on exam today.  Suspect more postinfectious cough.  Symptomatic care in the meantime.  Albuterol prescription sent         Orders This Visit:  No orders of the defined types were placed in this encounter.      Meds This Visit:  Requested Prescriptions     Signed Prescriptions Disp Refills    Beclomethasone Dipropionate (QNASL) 80 MCG/ACT Nasal Aero Soln 31.8 g 2     Si sprays by Nasal route daily.    levocetirizine 5 MG Oral Tab 90 tablet 2     Sig: Take 0.5 tablets (2.5 mg total) by mouth every evening.    albuterol (PROAIR HFA) 108 (90 Base) MCG/ACT Inhalation Aero Soln 1 each 0     Sig: Inhale 2 puffs into the lungs every 4 (four) hours as needed for Wheezing.   This

## 2024-04-17 RX ORDER — BECLOMETHASONE DIPROPIONATE 80 UG/1
2 AEROSOL, METERED NASAL DAILY
Qty: 31.8 G | Refills: 2 | OUTPATIENT
Start: 2024-04-17

## 2024-04-17 NOTE — TELEPHONE ENCOUNTER
Per our records prescription was sent for QNASL. Tiara was called to confirm receipt of prescription. [facility]ara received prescription and reported it is ready for .     This prescription is denied due to it being a duplicate.

## 2024-06-13 ENCOUNTER — OFFICE VISIT (OUTPATIENT)
Dept: SURGERY | Facility: CLINIC | Age: 49
End: 2024-06-13
Payer: COMMERCIAL

## 2024-06-13 VITALS
SYSTOLIC BLOOD PRESSURE: 114 MMHG | BODY MASS INDEX: 31.28 KG/M2 | OXYGEN SATURATION: 99 % | DIASTOLIC BLOOD PRESSURE: 70 MMHG | HEIGHT: 67.7 IN | HEART RATE: 69 BPM | WEIGHT: 204 LBS

## 2024-06-13 DIAGNOSIS — F32.A DEPRESSION, UNSPECIFIED DEPRESSION TYPE: ICD-10-CM

## 2024-06-13 DIAGNOSIS — K21.9 GASTROESOPHAGEAL REFLUX DISEASE WITHOUT ESOPHAGITIS: ICD-10-CM

## 2024-06-13 DIAGNOSIS — E66.9 OBESITY (BMI 30-39.9): ICD-10-CM

## 2024-06-13 DIAGNOSIS — Z51.81 ENCOUNTER FOR THERAPEUTIC DRUG MONITORING: ICD-10-CM

## 2024-06-13 DIAGNOSIS — E78.00 HYPERCHOLESTEROLEMIA: Primary | ICD-10-CM

## 2024-06-13 DIAGNOSIS — C85.90 NON-HODGKIN'S LYMPHOMA, UNSPECIFIED BODY REGION, UNSPECIFIED NON-HODGKIN LYMPHOMA TYPE (HCC): ICD-10-CM

## 2024-06-13 DIAGNOSIS — E55.9 VITAMIN D DEFICIENCY: ICD-10-CM

## 2024-06-13 PROCEDURE — 3074F SYST BP LT 130 MM HG: CPT | Performed by: NURSE PRACTITIONER

## 2024-06-13 PROCEDURE — 3008F BODY MASS INDEX DOCD: CPT | Performed by: NURSE PRACTITIONER

## 2024-06-13 PROCEDURE — 3078F DIAST BP <80 MM HG: CPT | Performed by: NURSE PRACTITIONER

## 2024-06-13 PROCEDURE — 99214 OFFICE O/P EST MOD 30 MIN: CPT | Performed by: NURSE PRACTITIONER

## 2024-06-13 RX ORDER — SEMAGLUTIDE 0.25 MG/.5ML
0.25 INJECTION, SOLUTION SUBCUTANEOUS WEEKLY
Qty: 4 EACH | Refills: 1 | Status: SHIPPED | OUTPATIENT
Start: 2024-06-13

## 2024-06-13 NOTE — PROGRESS NOTES
[facility], [facility], [place]  [address] [facility] [address] [place] [id number]  Dept: [phone]     Patient:  Indiana Boudreaux  :      1975  MRN:      [id number]    Chief Complaint:    Chief Complaint   Patient presents with    Follow - Up    Weight Management    Obesity     SUBJECTIVE     History of Present Illness:  Indiana is being seen today for a follow-up for medically supported weight loss.     Last OV 2023.     Entering bill-menopause phase.   Reports she is frustrated and unable to lose weight.   Interested in weight loss injections.     Hx lymphoma. Now follows with Dr. Melara- post RT colon/sigmoid stricture monitoring.    Lives with: twins, .  Occupation:  Special Ed.    Past Medical History:   Past Medical History:    Anxiety state, unspecified    Chronic maxillary sinusitis    Deviated nasal septum    Endometriosis    Ethmoid sinusitis    Exposure to radiation    GERD (gastroesophageal reflux disease)    H/O dilation and curettage    Hypertrophy of nasal turbinates    Non Hodgkin's lymphoma (HCC)    treated with localized RT    Seasonal allergies        Comorbidities:  HLD     OBJECTIVE     Vitals: /70 (BP Location: Right arm, Patient Position: Sitting, Cuff Size: adult)   Pulse 69   Ht 5' 7.7\" (1.72 m)   Wt 204 lb (92.5 kg)   LMP 2024 (Exact Date)   SpO2 99%   BMI 31.29 kg/m²     Interval weight loss:    Total weight loss: -02   Start weight: 205    Wt Readings from Last 6 Encounters:   24 204 lb (92.5 kg)   24 198 lb (89.8 kg)   24 208 lb 3.2 oz (94.4 kg)   24 207 lb (93.9 kg)   23 206 lb (93.4 kg)   23 195 lb (88.5 kg)       Patient Medications:    Current Outpatient Medications   Medication Sig Dispense Refill    semaglutide-weight management (WEGOVY) 0.25 MG/0.5ML Subcutaneous Solution Auto-injector Inject 0.5 mL (0.25 mg total) into the skin once a week. 4  each 1    Beclomethasone Dipropionate (QNASL) 80 MCG/ACT Nasal Aero Soln 2 sprays by Nasal route daily. 31.8 g 2    levocetirizine 5 MG Oral Tab Take 0.5 tablets (2.5 mg total) by mouth every evening. 90 tablet 2    albuterol (PROAIR HFA) 108 (90 Base) MCG/ACT Inhalation Aero Soln Inhale 2 puffs into the lungs every 4 (four) hours as needed for Wheezing. 1 each 0    VYVANSE 20 MG Oral Cap Take 1 capsule (20 mg total) by mouth daily.      Omeprazole 40 MG Oral Capsule Delayed Release Take 1 capsule (40 mg total) by mouth daily. 90 capsule 3    Polyethylene Glycol 3350 4 g Oral Powd Pack Take 1 each by mouth daily. (Patient not taking: Reported on 2/13/2024) 72 each 11    albuterol (PROAIR HFA) 108 (90 Base) MCG/ACT Inhalation Aero Soln Inhale 2 puffs into the lungs every 4 (four) hours as needed for Wheezing. (Patient not taking: Reported on 2/13/2024) 1 each 0    TOPIRAMATE 25 MG Oral Tab TAKE 1 TABLET(25 MG) BY MOUTH TWICE DAILY 180 tablet 0    escitalopram 10 MG Oral Tab Take 1 tablet (10 mg total) by mouth daily.      ALPRAZolam (XANAX) 0.25 MG Oral Tab Take 1 tablet (0.25 mg total) by mouth 2 (two) times daily as needed for Anxiety. 30 tablet 0    buPROPion 150 MG Oral Tablet 24 Hr Take 1 tablet (150 mg total) by mouth daily.       Allergies:  Sulfa antibiotics     Social History:  Reviewed    Surgical History:    Past Surgical History:   Procedure Laterality Date    Excision turbinate,submucous      Nasal scopy,remv part ethmoid      Nasal scopy,rmv tiss maxill sinus      Other Left 2005    excision groin lymph node    Other  2010    ectopic pregnancy     Reduction left  2000    Reduction left  2002    Reduction right  2000    Reduction right  2002    Repair of nasal septum       Family History:    Family History   Problem Relation Age of Onset    Cancer Mother 79        bladder; non-smoker    Diabetes Maternal Grandmother     Heart Disorder Maternal Grandfather     Breast Cancer Paternal Grandmother          20s, lived til age 88    Hypertension Half-Brother     Cancer Self 29        non hodgkins lymphoma     Cancer Maternal Aunt 83        brain cancer    Cancer Paternal Uncle         unknown type       Food Journal  Reviewed and Discussed:       Patient has a Food Journal?: no   Patient is reading nutrition labels?  yes  Average Caloric Intake:     Average CHO Intake:   Is patient exercising? Yes  Type of exercise? Pilates 3 times/week     Eating Habits  Patient states the following:  Eats 2 meal(s) per day  Length of time it takes to consume a meal:    # of snacks per day:   Type of snacks:  +sweets after dinner  Amount of soda consumption per day:  Occasional   Amount of water (in ounces) per day:  Adequate   Toughest challenge: stress     Nutritional Goals  Eat 3-4 cups of fresh fruits or vegetables daily    Behavior Modifications Reviewed and Discussed  Eat breakfast, Eat 3 meals per day, Plan meals in advance, Read nutrition labels, Drink 64 oz of water per day, Maintain a daily food journal, Utlize portion control strategies to reduce calorie intake, Identify triggers for eating and manage cues and Eat slowly and take 20 to 30 minutes to complete each meal    Exercise Goals Reviewed and Discussed:   Something peaceful: walking    ROS:    Constitutional: negative  Respiratory: negative  Cardiovascular: negative  Gastrointestinal: positive for reflux symptoms and pulling abdomen  Genitourinary:positive for endometriosis  Integument/breast: negative  Hematologic/lymphatic: positive for Hx lymphoma   Musculoskeletal:negative  Neurological: positive for vestibular migraines  Behavioral/Psych: negative  Endocrine: negative  All other systems were reviewed and are negative    Physical Exam:   General appearance: alert, appears stated age, cooperative and obese  Head: Normocephalic, without obvious abnormality, atraumatic  Neck: no adenopathy, no carotid bruit, no JVD, supple, symmetrical, trachea midline and thyroid not  enlarged, symmetric, no tenderness/mass/nodules  Lungs: clear to auscultation bilaterally  Heart: S1, S2 normal, no murmur, click, rub or gallop, regular rate and rhythm  Abdomen: soft, non-tender; bowel sounds normal; no masses,  no organomegaly and abdomen obese   Extremities: intact, no edema   Pulses: 2+ and symmetric  Skin: intact   Neurologic: Grossly normal      ASSESSMENT       Encounter Diagnosis(ses):   Encounter Diagnoses   Name Primary?    Hypercholesterolemia Yes    Encounter for therapeutic drug monitoring     Non-Hodgkin's lymphoma, unspecified body region, unspecified non-Hodgkin lymphoma type (HCC)     Obesity (BMI 30-39.9)     Depression, unspecified depression type     Gastroesophageal reflux disease without esophagitis     Vitamin D deficiency        PLAN         Diagnoses and all orders for this visit:    Hypercholesterolemia  -     DIETITIAN EDUCATION INITIAL, DIET (INTERNAL)    Encounter for therapeutic drug monitoring  -     DIETITIAN EDUCATION INITIAL, DIET (INTERNAL)    Non-Hodgkin's lymphoma, unspecified body region, unspecified non-Hodgkin lymphoma type (HCC)  -     DIETITIAN EDUCATION INITIAL, DIET (INTERNAL)    Obesity (BMI 30-39.9)  -     semaglutide-weight management (WEGOVY) 0.25 MG/0.5ML Subcutaneous Solution Auto-injector; Inject 0.5 mL (0.25 mg total) into the skin once a week.  -     DIETITIAN EDUCATION INITIAL, DIET (INTERNAL)    Depression, unspecified depression type  -     DIETITIAN EDUCATION INITIAL, DIET (INTERNAL)    Gastroesophageal reflux disease without esophagitis  -     DIETITIAN EDUCATION INITIAL, DIET (INTERNAL)    Vitamin D deficiency  -     DIETITIAN EDUCATION INITIAL, DIET (INTERNAL)      DYSLIPIDEMIA: Recommend dietary changes and lifestyle modifications as discussed below. Monitor.     Lab Results   Component Value Date/Time    CHOLEST 211 (H) 12/22/2021 08:52 AM     (H) 12/22/2021 08:52 AM    HDL 64 (H) 12/22/2021 08:52 AM    TRIG 64 12/22/2021 08:52 AM     VLDL 13 12/22/2021 08:52 AM    TCHDLRFRANCES 2.72 07/20/2017 10:14 AM       Hx OBESITY:  Overweight:    Recommended patient continue intensive lifestyle and behavioral modifications at this time for weight loss.    Reviewed lifestyle modifications: Whole Food/Plant Based diet, moderate alcohol consumption, reduced sodium intake to no more than 2,400 mg/day, and at least 150 minutes of moderate physical activity per week.  Avoid processed, poor quality carbohydrates, refined grains, flour, sugar.    Goals for next month:  1. Keep a food log.   2. Drink 64 ounces of non-caloric beverages per day. No fruit juices or regular soda.  3. Aim for 150 minutes moderate exercise per week.    4. Increase fruit and vegetable servings to 5-6 per day.    5. Improve sleep and stress.     Reviewed labs:  12/22/2021- a1c 5.6, mildly increased.   Elevated FBS, CMP otherwise in range.  B 12 in range.   Vitamin D mildly low- continue daily OTC supplement.   Thyroid studies note low T3.   2/27/23- CBC, CMP in range.   7/14/23- CMP, TSH, Vitamin B12/Folate in range. RBC, Hgb, hematocrit, ferritin decreased--receives iron infusions.   4/11/24- CBC, CMP in range.     Has taken phentermine in the past- did not feel this medication was sustainable.    Denies history of renal stones.   Hx vestibular migraine h/a.    Continue wellbutrin 150 mg/day.  Continue lexapro.     Continue Vyvanse 20 mg daily for now- monitor closely- Vyvanse/Wellbutrin interactions.    Continue 50 mg topiramate in the evening for cravings and to assist weight loss, migraines (ordered by neurology).   Careful with alcohol intake.   Consider increasing up to 100 mg daily.      Has interest in GLP-1 agonist. Previously not covered.  Will recheck Wegovy coverage at this time.    Long discussion with patient that I do not have any experience in taking care of someone who has a sigmoid stricture who wants to take Wegovy or Ozempic.    Patient will attend [facility] menopause  clinic and consider her options for weight management there.     Discussed with patient that the alternative plan could be: Diethylpropion + topiramate increase vs low dose Wegovy or OOP Ozempic with very careful monitoring.   Discussed with patient that she will have to stop Vyvanse when starting Diethylpropion or Wegovy (due to constipation risk).    Discussed risks, benefits, and side effects of medication especially in setting of sigmoid stricture. Patient states understanding and willing to take risk to try low dose Wegovy injections.   IUD intact.     Continue therapy.   Follows with psychiatrist.     AICR food list and plate method.    Meet with JOEY.    Menopause clinic at [facility].     RTC 3-4 months.     JUDIE Vergara Student

## 2024-06-13 NOTE — PROGRESS NOTES
[facility], [facility], [place]  [address] [facility] [address] [place] [id number]  Dept: [phone]     Patient:  Indiana Boudreaux  :      1975  MRN:      [id number]    Chief Complaint:    Chief Complaint   Patient presents with    Follow - Up    Weight Management    Obesity     SUBJECTIVE     History of Present Illness:  Indiana is being seen today for a follow-up for medically supported weight loss.     Last OV 2023.     Entering bill-menopause phase.   Reports she is frustrated and unable to lose weight.   Interested in weight loss injections.     Hx lymphoma. Now follows with Dr. Melara- post RT colon/sigmoid stricture monitoring.    Lives with: twins, .  Occupation:  Special Ed.    Past Medical History:   Past Medical History:    Anxiety state, unspecified    Chronic maxillary sinusitis    Deviated nasal septum    Endometriosis    Ethmoid sinusitis    Exposure to radiation    GERD (gastroesophageal reflux disease)    H/O dilation and curettage    Hypertrophy of nasal turbinates    Non Hodgkin's lymphoma (HCC)    treated with localized RT    Seasonal allergies        Comorbidities:  HLD     OBJECTIVE     Vitals: /70 (BP Location: Right arm, Patient Position: Sitting, Cuff Size: adult)   Pulse 69   Ht 5' 7.7\" (1.72 m)   Wt 204 lb (92.5 kg)   LMP 2024 (Exact Date)   SpO2 99%   BMI 31.29 kg/m²     Interval weight loss:    Total weight loss: -02   Start weight: 205    Wt Readings from Last 6 Encounters:   24 204 lb (92.5 kg)   24 198 lb (89.8 kg)   24 208 lb 3.2 oz (94.4 kg)   24 207 lb (93.9 kg)   23 206 lb (93.4 kg)   23 195 lb (88.5 kg)       Patient Medications:    Current Outpatient Medications   Medication Sig Dispense Refill    semaglutide-weight management (WEGOVY) 0.25 MG/0.5ML Subcutaneous Solution Auto-injector Inject 0.5 mL (0.25 mg total) into the skin once a week. 4  each 1    Beclomethasone Dipropionate (QNASL) 80 MCG/ACT Nasal Aero Soln 2 sprays by Nasal route daily. 31.8 g 2    levocetirizine 5 MG Oral Tab Take 0.5 tablets (2.5 mg total) by mouth every evening. 90 tablet 2    albuterol (PROAIR HFA) 108 (90 Base) MCG/ACT Inhalation Aero Soln Inhale 2 puffs into the lungs every 4 (four) hours as needed for Wheezing. 1 each 0    VYVANSE 20 MG Oral Cap Take 1 capsule (20 mg total) by mouth daily.      Omeprazole 40 MG Oral Capsule Delayed Release Take 1 capsule (40 mg total) by mouth daily. 90 capsule 3    Polyethylene Glycol 3350 4 g Oral Powd Pack Take 1 each by mouth daily. (Patient not taking: Reported on 2/13/2024) 72 each 11    albuterol (PROAIR HFA) 108 (90 Base) MCG/ACT Inhalation Aero Soln Inhale 2 puffs into the lungs every 4 (four) hours as needed for Wheezing. (Patient not taking: Reported on 2/13/2024) 1 each 0    TOPIRAMATE 25 MG Oral Tab TAKE 1 TABLET(25 MG) BY MOUTH TWICE DAILY 180 tablet 0    escitalopram 10 MG Oral Tab Take 1 tablet (10 mg total) by mouth daily.      ALPRAZolam (XANAX) 0.25 MG Oral Tab Take 1 tablet (0.25 mg total) by mouth 2 (two) times daily as needed for Anxiety. 30 tablet 0    buPROPion 150 MG Oral Tablet 24 Hr Take 1 tablet (150 mg total) by mouth daily.       Allergies:  Sulfa antibiotics     Social History:  Reviewed    Surgical History:    Past Surgical History:   Procedure Laterality Date    Excision turbinate,submucous      Nasal scopy,remv part ethmoid      Nasal scopy,rmv tiss maxill sinus      Other Left 2005    excision groin lymph node    Other  2010    ectopic pregnancy     Reduction left  2000    Reduction left  2002    Reduction right  2000    Reduction right  2002    Repair of nasal septum       Family History:    Family History   Problem Relation Age of Onset    Cancer Mother 79        bladder; non-smoker    Diabetes Maternal Grandmother     Heart Disorder Maternal Grandfather     Breast Cancer Paternal Grandmother          20s, lived til age 88    Hypertension Half-Brother     Cancer Self 29        non hodgkins lymphoma     Cancer Maternal Aunt 83        brain cancer    Cancer Paternal Uncle         unknown type       Food Journal  Reviewed and Discussed:       Patient has a Food Journal?: no   Patient is reading nutrition labels?  yes  Average Caloric Intake:     Average CHO Intake:   Is patient exercising? Yes  Type of exercise? Pilates 3 times/week     Eating Habits  Patient states the following:  Eats 2 meal(s) per day  Length of time it takes to consume a meal:    # of snacks per day:   Type of snacks:  +sweets after dinner  Amount of soda consumption per day:  Occasional   Amount of water (in ounces) per day:  Adequate   Toughest challenge: stress     Nutritional Goals  Eat 3-4 cups of fresh fruits or vegetables daily    Behavior Modifications Reviewed and Discussed  Eat breakfast, Eat 3 meals per day, Plan meals in advance, Read nutrition labels, Drink 64 oz of water per day, Maintain a daily food journal, Utlize portion control strategies to reduce calorie intake, Identify triggers for eating and manage cues and Eat slowly and take 20 to 30 minutes to complete each meal    Exercise Goals Reviewed and Discussed:   Something peaceful: walking    ROS:    Constitutional: negative  Respiratory: negative  Cardiovascular: negative  Gastrointestinal: positive for reflux symptoms and pulling abdomen  Genitourinary:positive for endometriosis  Integument/breast: negative  Hematologic/lymphatic: positive for Hx lymphoma   Musculoskeletal:negative  Neurological: positive for vestibular migraines  Behavioral/Psych: negative  Endocrine: negative  All other systems were reviewed and are negative    Physical Exam:   General appearance: alert, appears stated age, cooperative and obese  Head: Normocephalic, without obvious abnormality, atraumatic  Neck: no adenopathy, no carotid bruit, no JVD, supple, symmetrical, trachea midline and thyroid not  enlarged, symmetric, no tenderness/mass/nodules  Lungs: clear to auscultation bilaterally  Heart: S1, S2 normal, no murmur, click, rub or gallop, regular rate and rhythm  Abdomen: soft, non-tender; bowel sounds normal; no masses,  no organomegaly and abdomen obese   Extremities: intact, no edema   Pulses: 2+ and symmetric  Skin: intact   Neurologic: Grossly normal      ASSESSMENT       Encounter Diagnosis(ses):   Encounter Diagnoses   Name Primary?    Hypercholesterolemia Yes    Encounter for therapeutic drug monitoring     Non-Hodgkin's lymphoma, unspecified body region, unspecified non-Hodgkin lymphoma type (HCC)     Obesity (BMI 30-39.9)     Depression, unspecified depression type     Gastroesophageal reflux disease without esophagitis     Vitamin D deficiency        PLAN         Diagnoses and all orders for this visit:    Hypercholesterolemia  -     DIETITIAN EDUCATION INITIAL, DIET (INTERNAL)    Encounter for therapeutic drug monitoring  -     DIETITIAN EDUCATION INITIAL, DIET (INTERNAL)    Non-Hodgkin's lymphoma, unspecified body region, unspecified non-Hodgkin lymphoma type (HCC)  -     DIETITIAN EDUCATION INITIAL, DIET (INTERNAL)    Obesity (BMI 30-39.9)  -     semaglutide-weight management (WEGOVY) 0.25 MG/0.5ML Subcutaneous Solution Auto-injector; Inject 0.5 mL (0.25 mg total) into the skin once a week.  -     DIETITIAN EDUCATION INITIAL, DIET (INTERNAL)    Depression, unspecified depression type  -     DIETITIAN EDUCATION INITIAL, DIET (INTERNAL)    Gastroesophageal reflux disease without esophagitis  -     DIETITIAN EDUCATION INITIAL, DIET (INTERNAL)    Vitamin D deficiency  -     DIETITIAN EDUCATION INITIAL, DIET (INTERNAL)      DYSLIPIDEMIA: Recommend dietary changes and lifestyle modifications as discussed below. Monitor.     Lab Results   Component Value Date/Time    CHOLEST 211 (H) 12/22/2021 08:52 AM     (H) 12/22/2021 08:52 AM    HDL 64 (H) 12/22/2021 08:52 AM    TRIG 64 12/22/2021 08:52 AM     VLDL 13 12/22/2021 08:52 AM    TCHDLRFRANCES 2.72 07/20/2017 10:14 AM       Hx OBESITY:  Overweight:    Recommended patient continue intensive lifestyle and behavioral modifications at this time for weight loss.    Reviewed lifestyle modifications: Whole Food/Plant Based diet, moderate alcohol consumption, reduced sodium intake to no more than 2,400 mg/day, and at least 150 minutes of moderate physical activity per week.  Avoid processed, poor quality carbohydrates, refined grains, flour, sugar.    Goals for next month:  1. Keep a food log.   2. Drink 64 ounces of non-caloric beverages per day. No fruit juices or regular soda.  3. Aim for 150 minutes moderate exercise per week.    4. Increase fruit and vegetable servings to 5-6 per day.    5. Improve sleep and stress.     Reviewed labs:  12/22/2021- a1c 5.6, mildly increased.   Elevated FBS, CMP otherwise in range.  B 12 in range.   Vitamin D mildly low- continue daily OTC supplement.   Thyroid studies note low T3.   2/27/23- CBC, CMP in range.   7/14/23- CMP, TSH, Vitamin B12/Folate in range. RBC, Hgb, hematocrit, ferritin decreased--receives iron infusions.   4/11/24- CBC, CMP in range.     Has taken phentermine in the past- did not feel this medication was sustainable.    Denies history of renal stones.   Hx vestibular migraine h/a.    Continue wellbutrin 150 mg/day.  Continue lexapro.     Continue Vyvanse 20 mg daily for now- monitor closely- Vyvanse/Wellbutrin interactions.    Continue 50 mg topiramate in the evening for cravings and to assist weight loss, migraines (ordered by neurology).   Careful with alcohol intake.   Consider increasing up to 100 mg daily.      Has interest in GLP-1 agonist. Previously not covered.  Will recheck Wegovy coverage at this time.    Long discussion with patient that I do not have any experience in taking care of someone who has a sigmoid stricture who wants to take Wegovy or Ozempic.    Patient will attend [facility] menopause  clinic and consider her options for weight management there.     Discussed with patient that the alternative plan could be: Diethylpropion + topiramate increase vs low dose Wegovy or OOP Ozempic with very careful monitoring.   Discussed with patient that she will have to stop Vyvanse when starting Diethylpropion or Wegovy (due to constipation risk).    Discussed risks, benefits, and side effects of medication especially in setting of sigmoid stricture. Patient states understanding and willing to take risk to try low dose Wegovy injections.   IUD intact.     Continue therapy.   Follows with psychiatrist.     AICR food list and plate method.    Meet with JOEY.    Menopause clinic at [facility].     RTC 3-4 months.     JUDIE Bell

## 2024-06-24 NOTE — PROGRESS NOTES
INITIAL OUTPATIENT NUTRITION CONSULTATION    Nutrition Assessment    Medical Diagnosis: Obesity  Client Age and Gender: 48 year old female    Labs:   No components found for: \"HGBA1C\"  Triglycerides   Date Value Ref Range Status   12/22/2021 64 30 - 149 mg/dL Final     Comment:     INTERPRETIVE INFORMATION: Triglycerides    A normal fasting serum triglycerides concentration in adults is   149 mg/dL or less. A borderline-high fasting serum triglycerides   concentration in adults is 150 to 199 mg/dL. A high fasting serum   triglycerides concentration in adults is 200 to 499 mg/dL. A very   high fasting serum triglycerides concentration in adults is 500   mg/dL or greater.   08/11/2020 47 30 - 149 mg/dL Final     Comment:     Reference interval for fasting triglycerides  Desirable: <150 mg/dL  Borderline: 150-199 mg/dL  High: 200-499 mg/dL  Very High: >=500 mg/dL           LDL Cholesterol   Date Value Ref Range Status   12/22/2021 134 (H) 0 - 129 mg/dL Final     Comment:     INTERPRETIVE INFORMATION: Low Density Lipoprotein, Calculated   LDL-Cholesterol therapeutic goal:   99 mg/dL or less if patient has CHD.   129 mg/dL or less if no CHD and 2 or more risk factors.   159 mg/dL or less if no CHD and less than 2 risk factors.   08/11/2020 105 (H) <100 mg/dL Final     Comment:     Desirable <100 mg/dL   Borderline 100-129 mg/dL   High     >=130mg/dL         HDL Cholesterol   Date Value Ref Range Status   12/22/2021 64 (H) 40 - 59 mg/dL Final     Comment:     INTERPRETIVE INFORMATION: High Density Lipoproteins    An HDL cholesterol less than 40 mg/dL is low and constitutes a   coronary heart disease risk factor. An HDL cholesterol greater   than 60 mg/dL is a negative risk factor for coronary heart disease.    CHD Risk Factors:    +1  Age: Men,   45 years and older             Women, 55 years and older or premature menopause                    without estrogen therapy    +1  Family history of premature CHD    +1   Current smoking    +1  Hypertension    +1  Diabetes mellitus    +1  Low HDL Cholesterol:  39 mg/dL or less    -1  High HDL Cholesterol: 60 mg/dL or greater   08/11/2020 60 (H) 40 - 59 mg/dL Final     Comment:     Interpretive Information:   An HDL cholesterol <40 mg/dL is low and constitutes a coronary heart disease risk factor. An HDL cholesterol >60 mg/dL is a negative risk factor for coronary heart disease.         AST   Date Value Ref Range Status   12/22/2021 20 15 - 37 U/L Final   08/15/2018 17 15 - 41 U/L Final     ALT   Date Value Ref Range Status   12/22/2021 24 13 - 56 U/L Final   08/15/2018 18 14 - 54 U/L Final       Social:  Household: , boy twins (11)  Occupation:  Special Ed     Weight Loss Medications: Vyvanse*, topiramate*, bupropion*  Insurance denied coverage for Wegovy    Patient started weight loss clinic on 12/20/21 with initial weight of 205 lbs. Today is her initial visit with the [facility] RD. She previously saw a RD for weight loss.  Would like to reach a goal weight of 160 lbs. Today's Ht 5' 7.72\" (1.72 m)   Wt 205 lb (93 kg)   LMP 03/12/2024 (Exact Date)   BMI 31.43 kg/m² indicating a 0 lbs weight loss since stating the [facility].     Wt Readings from Last 6 Encounters:   06/13/24 204 lb (92.5 kg)   04/02/24 198 lb (89.8 kg)   02/13/24 208 lb 3.2 oz (94.4 kg)   01/30/24 207 lb (93.9 kg)   07/21/23 206 lb (93.4 kg)   07/14/23 195 lb (88.5 kg)       Diet History:  Previous Attempts at Weight Loss: Kianna THORNTON    Patient seen in clinic today for nutrition counseling to assist with weight loss. Patient is not interested in bariatric surgery. Reports certain challenges/barriers associated with weight loss. Specifically reports she has endermosis and a colon stricture from radiation therapy. She has h/o disordered eating. She has difficulty meal planning with her sons being in baseball so they do eat out frequently. Discussed importance of getting 3 meals/day for adequate  nutrition, focusing on at least 3-4 oz protein/meal. Reviewed eating slower (20-30 minutes/meal). Discussed typical diet which consists of 2-3 meals and 1-2 snacks/day. She is not currently tracking her meals. Patient was encouraged to start tracking dietary intake using an phill at initial visit. She is aiming for <100 grams CHO/day. She is currently eating meals at consistent times. She continues on Vyvanse for ADHD, topiramate for migraines, and bupropion for depression but both may help with cravings. Patient knows to stop Vyvanse once starting Wegovy d/t constipation risk. No adverse SE reported. She is responsible for grocery shopping and cooking. She is eating out 5 x week. No food log provided; verbal dietary recall listed below.    Typical Diet:  Breakfast: coffee  Snack: (9:30 am) greek yogurt OR protein shake  Lunch: leftovers OR SKIPS  Snack: (2:30 pm) popcorn OR whatever she can find at school  Dinner: (7:00 pm) beef tacos with cheese and sour cream on flatbread or lettuce OR Mario's OR air fried chicken with salad OR pork chop OR pizza    Beverages: 2 Cups coffee with cream and 2 stevia, water, seltzer, diet soda  Alcoholic Beverages: wine socially    Exercise:  Pilates 3 days/week    Intervention   Education:  Review of Food Intake (pt provided food log/journal - no)  Importance of keeping a food log  Discussion of food modification to current diet (see GOALS)  Discussed ideas for breakfast, lunch, dinner, snacks  Basic Nutrition Education  Discussion of food groups and what constitutes a serving  Discussion of number of servings in each food group to have per meal or snack  Discussed use of high fiber vs refined carbs; use of lowfat proteins; saturated vs unsaturated fats  Importance of eating consistently and not skipping meals  Importance of protein for satiety  Importance of meal planning  Discussion of need for exercise for weight loss (see GOALS)    Handouts Provided: Healthy Plate, Free Foods  List, Protein Supplement Suggestions, Portion Control Tips, List of Carbohydrate, Protein, and Fat Sources.    Goals:   Keep a food record, My Net Diary, select the macros dashboard.  Focus on healthy plate; 1/2 plate vegetables, 1/4 plate protein, 1/4 plate carbohydrates  Start each meal with protein first  Aim for 90 grams protein/day or 20-30 grams protein/meal  Quick convenient protein options: tuna, deli meat (turkey, chicken, roast beef), pre-cooked shrimp or chicken, greek yogurt, cottage cheese, protein shakes  When choosing yogurt aim for option consisting of 10-15 grams protein and  calories/serving  High protein yogurt examples: Siggi;s, Palestinian, Two Good, Oikos Triple Zero, Dannon light and fit greek, YQ by Yoplait, Fayusef, Chobani Less Sugar  Aim for <100 grams carbohydrates/day   Aim for 64 oz of water/day  Meal plan ahead of time  Use measuring cups and/or food scale for portion control  Start probiotic: VSL #3 ([facility])  Exercise with a goal of 30 minutes per day for exercise (for example,walking).    Strength training at least 10 minutes 3 days per week.    Exercise:  Stay active - focus on realistic activities that fit into your schedule  Plan ahead of exercise - treat as an appointment  When getting started with exercise, slowly build up duration and intensity  Aim for 30 minutes or more of moderate to vigorous exercise 5 x week  Incorporate strength training 3 x week for at least 10 minutes    At Home Fitness Suggestions:  Band Exercises:  Start low resistance beginner exercises using bands.  Here are some YouTube band exercise videos you could try:  www.kooabaube.com/watch?v=Sc3ZaoEUX9D  www.kooabaube.com/watch?v=wvnuuRKbsLs    Behavior Modification:  Set small goals for self and focus on realistic, attainable changes to work on long-term.  Start probiotic: VSL #3 ([facility])  Exercise with a goal of 30 minutes per day for exercise (for example,walking).    Strength training at least 10  minutes 3 days per week.    Monitoring/Evaluation:  Follow up appt scheduled with dietitian      ALIA Alvarez RDN  Visit Length: 12:06 pm to 1:13 pm - 67 minutes

## 2024-06-25 ENCOUNTER — OFFICE VISIT (OUTPATIENT)
Dept: SURGERY | Facility: CLINIC | Age: 49
End: 2024-06-25

## 2024-06-25 VITALS — WEIGHT: 205 LBS | BODY MASS INDEX: 31.43 KG/M2 | HEIGHT: 67.72 IN

## 2024-06-25 DIAGNOSIS — E66.9 OBESITY (BMI 30-39.9): Primary | ICD-10-CM

## 2024-06-25 PROCEDURE — 97802 MEDICAL NUTRITION INDIV IN: CPT | Performed by: DIETITIAN, REGISTERED

## 2024-06-25 PROCEDURE — 3008F BODY MASS INDEX DOCD: CPT | Performed by: DIETITIAN, REGISTERED

## 2024-06-25 NOTE — PATIENT INSTRUCTIONS
It was so nice meeting you today. Please reach out with any questions or concerns. Thank you for including me in your weight loss journey. Here's a review of today's visit:    Goals:   Keep a food record, My Net Diary, select the macros dashboard.  Focus on healthy plate; 1/2 plate vegetables, 1/4 plate protein, 1/4 plate carbohydrates  Start each meal with protein first  Aim for 90 grams protein/day or 20-30 grams protein/meal  Quick convenient protein options: tuna, deli meat (turkey, chicken, roast beef), pre-cooked shrimp or chicken, greek yogurt, cottage cheese, protein shakes  When choosing yogurt aim for option consisting of 10-15 grams protein and  calories/serving  High protein yogurt examples: Siggi;s, Chinese, Two Good, Oikos Triple Zero, Dannon light and fit greek, YQ by Yoplait, Fage, Chobani Less Sugar  Aim for <100 grams carbohydrates/day   Aim for 64 oz of water/day  Meal plan ahead of time  Use measuring cups and/or food scale for portion control  Start probiotic: VSL #3 ([facility])  Exercise with a goal of 30 minutes per day for exercise (for example,walking).    Strength training at least 10 minutes 3 days per week.    Exercise:  Stay active - focus on realistic activities that fit into your schedule  Plan ahead of exercise - treat as an appointment  When getting started with exercise, slowly build up duration and intensity  Aim for 30 minutes or more of moderate to vigorous exercise 5 x week  Incorporate strength training 3 x week for at least 10 minutes    At Home Fitness Suggestions:  Band Exercises:  Start low resistance beginner exercises using bands.  Here are some YouTube band exercise videos you could try:  www.GoSporty.com/watch?v=Lw7KfxQUZ7U  www.FuelMinerube.com/watch?v=wvnuuRKbsLs    Behavior Modification:  Set small goals for self and focus on realistic, attainable changes to work on long-term.  Start probiotic: VSL #3 ([facility])  Exercise with a goal of 30 minutes per day for  exercise (for example,walking).    Strength training at least 10 minutes 3 days per week.

## 2024-07-12 DIAGNOSIS — E66.9 OBESITY (BMI 30-39.9): Primary | ICD-10-CM

## 2024-07-12 RX ORDER — DIETHYLPROPION HYDROCHLORIDE 75 MG/1
1 TABLET ORAL EVERY MORNING
Qty: 30 TABLET | Refills: 3 | Status: SHIPPED | OUTPATIENT
Start: 2024-07-12

## 2025-02-19 ENCOUNTER — OFFICE VISIT (OUTPATIENT)
Dept: OBGYN CLINIC | Facility: CLINIC | Age: 50
End: 2025-02-19
Payer: COMMERCIAL

## 2025-02-19 VITALS
DIASTOLIC BLOOD PRESSURE: 69 MMHG | SYSTOLIC BLOOD PRESSURE: 100 MMHG | WEIGHT: 195.63 LBS | BODY MASS INDEX: 30 KG/M2 | HEART RATE: 71 BPM

## 2025-02-19 DIAGNOSIS — Z01.419 ENCOUNTER FOR GYNECOLOGICAL EXAMINATION WITHOUT ABNORMAL FINDING: Primary | ICD-10-CM

## 2025-02-19 DIAGNOSIS — Z12.31 SCREENING MAMMOGRAM FOR BREAST CANCER: ICD-10-CM

## 2025-02-19 PROCEDURE — 3078F DIAST BP <80 MM HG: CPT | Performed by: OBSTETRICS & GYNECOLOGY

## 2025-02-19 PROCEDURE — 99396 PREV VISIT EST AGE 40-64: CPT | Performed by: OBSTETRICS & GYNECOLOGY

## 2025-02-19 PROCEDURE — 3074F SYST BP LT 130 MM HG: CPT | Performed by: OBSTETRICS & GYNECOLOGY

## 2025-02-20 NOTE — PROGRESS NOTES
Subjective:   Patient ID: Indiana Boudreaux is a 49 year old female.    HPI   with menses q 28d / 4-5d / normal. Family history significant for premenopausal breast CA in PGM. Priscila had consultation and negative gene testing. Priscila has a colon stricture and had consultation with Dr Zheng and a colorectal surgeon. MRI shows probable endometriosis on bowel. Recommendation to avoid surgery at this time. They may consider Lupron. She, on her own, had read about Ozempic decreasing inflammatory reactions in other places in body. She started this and interestingly has marked improvement in pelvic pain.     History/Other:   Review of Systems   Constitutional:  Negative for appetite change, fatigue and unexpected weight change.   Eyes:  Negative for visual disturbance.   Respiratory:  Negative for cough and shortness of breath.    Cardiovascular:  Negative for chest pain, palpitations and leg swelling.   Gastrointestinal:  Negative for abdominal distention, abdominal pain, blood in stool, constipation and diarrhea.   Genitourinary:  Positive for pelvic pain. Negative for dyspareunia, dysuria, frequency, menstrual problem and urgency.   Musculoskeletal:  Negative for arthralgias and myalgias.   Skin:  Negative for rash.   Neurological:  Negative for weakness, numbness and headaches.   Psychiatric/Behavioral:  Negative for dysphoric mood. The patient is not nervous/anxious.      Current Outpatient Medications   Medication Sig Dispense Refill    SEMAGLUTIDE,0.25 OR 0.5MG/DOS, SC       Beclomethasone Dipropionate (QNASL) 80 MCG/ACT Nasal Aero Soln 2 sprays by Nasal route daily. 31.8 g 2    levocetirizine 5 MG Oral Tab Take 0.5 tablets (2.5 mg total) by mouth every evening. 90 tablet 2    Omeprazole 40 MG Oral Capsule Delayed Release Take 1 capsule (40 mg total) by mouth daily. 90 capsule 3    TOPIRAMATE 25 MG Oral Tab TAKE 1 TABLET(25 MG) BY MOUTH TWICE DAILY 180 tablet 0    escitalopram 10 MG Oral Tab Take 1 tablet (10 mg  total) by mouth daily.      buPROPion 150 MG Oral Tablet 24 Hr Take 1 tablet (150 mg total) by mouth daily.      Diethylpropion HCl ER 75 MG Oral Tablet 24 Hr Take 1 tablet (75 mg total) by mouth every morning. Start with 1/2 tablet daily, increase to full tablet daily as tolerated 30 tablet 3    albuterol (PROAIR HFA) 108 (90 Base) MCG/ACT Inhalation Aero Soln Inhale 2 puffs into the lungs every 4 (four) hours as needed for Wheezing. 1 each 0    Polyethylene Glycol 3350 4 g Oral Powd Pack Take 1 each by mouth daily. (Patient not taking: Reported on 2/13/2024) 72 each 11    albuterol (PROAIR HFA) 108 (90 Base) MCG/ACT Inhalation Aero Soln Inhale 2 puffs into the lungs every 4 (four) hours as needed for Wheezing. (Patient not taking: Reported on 2/13/2024) 1 each 0    ALPRAZolam (XANAX) 0.25 MG Oral Tab Take 1 tablet (0.25 mg total) by mouth 2 (two) times daily as needed for Anxiety. 30 tablet 0     Allergies:Allergies[1]    Objective:   Physical Exam  Constitutional:       General: She is not in acute distress.     Appearance: She is well-developed. She is not diaphoretic.   Neck:      Thyroid: No thyromegaly.   Cardiovascular:      Rate and Rhythm: Normal rate and regular rhythm.      Heart sounds: Normal heart sounds. No murmur heard.  Pulmonary:      Effort: Pulmonary effort is normal.      Breath sounds: Normal breath sounds. No wheezing or rales.   Chest:   Breasts:     Right: Normal. No mass, nipple discharge, skin change or tenderness.      Left: Normal. No mass, nipple discharge, skin change or tenderness.      Comments:     Abdominal:      General: There is no distension.      Palpations: Abdomen is soft. There is no mass.      Tenderness: There is no abdominal tenderness. There is no guarding or rebound.   Genitourinary:     Labia:         Right: No rash or lesion.         Left: No rash or lesion.       Vagina: Normal. No vaginal discharge.      Cervix: No cervical motion tenderness or discharge.       Uterus: Not enlarged and not tender.       Adnexa:         Right: No mass, tenderness or fullness.          Left: No mass, tenderness or fullness.     Musculoskeletal:         General: No tenderness.      Cervical back: Neck supple.   Lymphadenopathy:      Cervical: No cervical adenopathy.      Upper Body:      Right upper body: No supraclavicular, axillary or pectoral adenopathy.      Left upper body: No supraclavicular, axillary or pectoral adenopathy.   Neurological:      Mental Status: She is alert.         Assessment & Plan:   1. Encounter for gynecological examination without abnormal finding    2. Screening mammogram for breast cancer        No orders of the defined types were placed in this encounter.      Meds This Visit:  Requested Prescriptions      No prescriptions requested or ordered in this encounter       Imaging & Referrals:  [facility] DANISHA 2D+3D SCREENING BILAT (CPT=77067/01706)         [1]   Allergies  Allergen Reactions    Sulfa Antibiotics HIVES

## 2025-04-16 ENCOUNTER — TELEPHONE (OUTPATIENT)
Dept: ALLERGY | Facility: CLINIC | Age: 50
End: 2025-04-16

## 2025-04-16 NOTE — TELEPHONE ENCOUNTER
Received a refill request for Qnasl nasal spray- 2 sprays each nostril daily.    Last office visit was 4/6/24 for allergies.    Will refill for 1 month and will send mychart message to patient regarding need to schedule a follow up appointment.

## 2025-04-24 ENCOUNTER — APPOINTMENT (OUTPATIENT)
Dept: CT IMAGING | Facility: HOSPITAL | Age: 50
End: 2025-04-24
Attending: EMERGENCY MEDICINE
Payer: COMMERCIAL

## 2025-04-24 ENCOUNTER — HOSPITAL ENCOUNTER (INPATIENT)
Facility: HOSPITAL | Age: 50
LOS: 2 days | Discharge: HOME | End: 2025-04-26
Attending: EMERGENCY MEDICINE | Admitting: HOSPITALIST
Payer: COMMERCIAL

## 2025-04-24 DIAGNOSIS — K56.609 LARGE BOWEL OBSTRUCTION (HCC): Primary | ICD-10-CM

## 2025-04-24 LAB
ALBUMIN SERPL-MCNC: 4.4 G/DL (ref 3.2–4.8)
ALBUMIN/GLOB SERPL: 1.6 {RATIO} (ref 1–2)
ALP LIVER SERPL-CCNC: 82 U/L (ref 39–100)
ALT SERPL-CCNC: 20 U/L (ref 10–49)
ANION GAP SERPL CALC-SCNC: 7 MMOL/L (ref 0–18)
AST SERPL-CCNC: 21 U/L (ref ?–34)
BASOPHILS # BLD AUTO: 0.02 X10(3) UL (ref 0–0.2)
BASOPHILS NFR BLD AUTO: 0.2 %
BILIRUB SERPL-MCNC: 0.5 MG/DL (ref 0.3–1.2)
BUN BLD-MCNC: 13 MG/DL (ref 9–23)
BUN/CREAT SERPL: 15.3 (ref 10–20)
CALCIUM BLD-MCNC: 8.9 MG/DL (ref 8.7–10.4)
CHLORIDE SERPL-SCNC: 107 MMOL/L (ref 98–112)
CO2 SERPL-SCNC: 26 MMOL/L (ref 21–32)
CREAT BLD-MCNC: 0.85 MG/DL (ref 0.55–1.02)
DEPRECATED RDW RBC AUTO: 42.8 FL (ref 35.1–46.3)
EGFRCR SERPLBLD CKD-EPI 2021: 85 ML/MIN/1.73M2 (ref 60–?)
EOSINOPHIL # BLD AUTO: 0.11 X10(3) UL (ref 0–0.7)
EOSINOPHIL NFR BLD AUTO: 1.2 %
ERYTHROCYTE [DISTWIDTH] IN BLOOD BY AUTOMATED COUNT: 13.1 % (ref 11–15)
GLOBULIN PLAS-MCNC: 2.7 G/DL (ref 2–3.5)
GLUCOSE BLD-MCNC: 114 MG/DL (ref 70–99)
HCT VFR BLD AUTO: 42.7 % (ref 35–48)
HGB BLD-MCNC: 14 G/DL (ref 12–16)
IMM GRANULOCYTES # BLD AUTO: 0.02 X10(3) UL (ref 0–1)
IMM GRANULOCYTES NFR BLD: 0.2 %
LYMPHOCYTES # BLD AUTO: 1.42 X10(3) UL (ref 1–4)
LYMPHOCYTES NFR BLD AUTO: 14.9 %
MCH RBC QN AUTO: 29.3 PG (ref 26–34)
MCHC RBC AUTO-ENTMCNC: 32.8 G/DL (ref 31–37)
MCV RBC AUTO: 89.3 FL (ref 80–100)
MONOCYTES # BLD AUTO: 0.71 X10(3) UL (ref 0.1–1)
MONOCYTES NFR BLD AUTO: 7.5 %
NEUTROPHILS # BLD AUTO: 7.25 X10 (3) UL (ref 1.5–7.7)
NEUTROPHILS # BLD AUTO: 7.25 X10(3) UL (ref 1.5–7.7)
NEUTROPHILS NFR BLD AUTO: 76 %
OSMOLALITY SERPL CALC.SUM OF ELEC: 291 MOSM/KG (ref 275–295)
PLATELET # BLD AUTO: 328 10(3)UL (ref 150–450)
POTASSIUM SERPL-SCNC: 3.8 MMOL/L (ref 3.5–5.1)
PROT SERPL-MCNC: 7.1 G/DL (ref 5.7–8.2)
RBC # BLD AUTO: 4.78 X10(6)UL (ref 3.8–5.3)
SODIUM SERPL-SCNC: 140 MMOL/L (ref 136–145)
WBC # BLD AUTO: 9.5 X10(3) UL (ref 4–11)

## 2025-04-24 PROCEDURE — 99222 1ST HOSP IP/OBS MODERATE 55: CPT | Performed by: HOSPITALIST

## 2025-04-24 PROCEDURE — 74177 CT ABD & PELVIS W/CONTRAST: CPT | Performed by: EMERGENCY MEDICINE

## 2025-04-24 PROCEDURE — 99223 1ST HOSP IP/OBS HIGH 75: CPT | Performed by: STUDENT IN AN ORGANIZED HEALTH CARE EDUCATION/TRAINING PROGRAM

## 2025-04-24 RX ORDER — FLUTICASONE PROPIONATE 50 MCG
2 SPRAY, SUSPENSION (ML) NASAL DAILY
Status: DISCONTINUED | OUTPATIENT
Start: 2025-04-24 | End: 2025-04-26

## 2025-04-24 RX ORDER — LISDEXAMFETAMINE DIMESYLATE 20 MG/1
20 CAPSULE ORAL EVERY MORNING
COMMUNITY
Start: 2025-03-25

## 2025-04-24 RX ORDER — CETIRIZINE HYDROCHLORIDE 5 MG/1
5 TABLET ORAL NIGHTLY
Status: DISCONTINUED | OUTPATIENT
Start: 2025-04-24 | End: 2025-04-26

## 2025-04-24 RX ORDER — IBUPROFEN 200 MG
200 TABLET ORAL EVERY 6 HOURS PRN
COMMUNITY

## 2025-04-24 RX ORDER — MORPHINE SULFATE 4 MG/ML
4 INJECTION, SOLUTION INTRAMUSCULAR; INTRAVENOUS ONCE
Status: COMPLETED | OUTPATIENT
Start: 2025-04-24 | End: 2025-04-24

## 2025-04-24 RX ORDER — LORAZEPAM 2 MG/ML
0.5 INJECTION INTRAMUSCULAR ONCE
Status: COMPLETED | OUTPATIENT
Start: 2025-04-24 | End: 2025-04-24

## 2025-04-24 RX ORDER — TEMAZEPAM 15 MG/1
15 CAPSULE ORAL NIGHTLY PRN
Status: DISCONTINUED | OUTPATIENT
Start: 2025-04-24 | End: 2025-04-26

## 2025-04-24 RX ORDER — DEXTROSE MONOHYDRATE, SODIUM CHLORIDE, AND POTASSIUM CHLORIDE 50; 1.49; 4.5 G/1000ML; G/1000ML; G/1000ML
INJECTION, SOLUTION INTRAVENOUS CONTINUOUS
Status: DISCONTINUED | OUTPATIENT
Start: 2025-04-24 | End: 2025-04-26

## 2025-04-24 RX ORDER — ACETAMINOPHEN 500 MG
500 TABLET ORAL EVERY 4 HOURS PRN
Status: DISCONTINUED | OUTPATIENT
Start: 2025-04-24 | End: 2025-04-26

## 2025-04-24 RX ORDER — TOPIRAMATE 25 MG/1
25 TABLET, FILM COATED ORAL 2 TIMES DAILY
Status: DISCONTINUED | OUTPATIENT
Start: 2025-04-24 | End: 2025-04-24

## 2025-04-24 RX ORDER — ACETAMINOPHEN 325 MG/1
325 TABLET ORAL EVERY 6 HOURS PRN
COMMUNITY

## 2025-04-24 RX ORDER — ESCITALOPRAM OXALATE 10 MG/1
10 TABLET ORAL DAILY
Status: DISCONTINUED | OUTPATIENT
Start: 2025-04-25 | End: 2025-04-26

## 2025-04-24 RX ORDER — ALPRAZOLAM 1 MG/1
1 TABLET ORAL 3 TIMES DAILY PRN
COMMUNITY
Start: 2025-03-24

## 2025-04-24 RX ORDER — BUPROPION HYDROCHLORIDE 150 MG/1
150 TABLET ORAL DAILY
Status: DISCONTINUED | OUTPATIENT
Start: 2025-04-25 | End: 2025-04-26

## 2025-04-24 RX ORDER — CETIRIZINE HYDROCHLORIDE 5 MG/1
5 TABLET ORAL DAILY
Status: DISCONTINUED | OUTPATIENT
Start: 2025-04-24 | End: 2025-04-24

## 2025-04-24 RX ORDER — MORPHINE SULFATE 2 MG/ML
2 INJECTION, SOLUTION INTRAMUSCULAR; INTRAVENOUS EVERY 2 HOUR PRN
Status: DISCONTINUED | OUTPATIENT
Start: 2025-04-24 | End: 2025-04-26

## 2025-04-24 RX ORDER — ONDANSETRON 2 MG/ML
4 INJECTION INTRAMUSCULAR; INTRAVENOUS EVERY 6 HOURS PRN
Status: DISCONTINUED | OUTPATIENT
Start: 2025-04-24 | End: 2025-04-26

## 2025-04-24 RX ORDER — TOPIRAMATE 25 MG/1
50 TABLET, FILM COATED ORAL NIGHTLY
Status: DISCONTINUED | OUTPATIENT
Start: 2025-04-24 | End: 2025-04-26

## 2025-04-24 RX ORDER — MORPHINE SULFATE 4 MG/ML
4 INJECTION, SOLUTION INTRAMUSCULAR; INTRAVENOUS EVERY 2 HOUR PRN
Status: DISCONTINUED | OUTPATIENT
Start: 2025-04-24 | End: 2025-04-26

## 2025-04-24 RX ORDER — PROCHLORPERAZINE EDISYLATE 5 MG/ML
5 INJECTION INTRAMUSCULAR; INTRAVENOUS EVERY 8 HOURS PRN
Status: DISCONTINUED | OUTPATIENT
Start: 2025-04-24 | End: 2025-04-26

## 2025-04-24 RX ORDER — ONDANSETRON 2 MG/ML
4 INJECTION INTRAMUSCULAR; INTRAVENOUS ONCE
Status: COMPLETED | OUTPATIENT
Start: 2025-04-24 | End: 2025-04-24

## 2025-04-24 RX ORDER — MORPHINE SULFATE 2 MG/ML
1 INJECTION, SOLUTION INTRAMUSCULAR; INTRAVENOUS EVERY 2 HOUR PRN
Status: DISCONTINUED | OUTPATIENT
Start: 2025-04-24 | End: 2025-04-26

## 2025-04-24 RX ORDER — ALPRAZOLAM 0.5 MG
1 TABLET ORAL 3 TIMES DAILY PRN
Status: DISCONTINUED | OUTPATIENT
Start: 2025-04-24 | End: 2025-04-26

## 2025-04-24 RX ORDER — PANTOPRAZOLE SODIUM 40 MG/1
40 TABLET, DELAYED RELEASE ORAL
Status: DISCONTINUED | OUTPATIENT
Start: 2025-04-25 | End: 2025-04-26

## 2025-04-24 NOTE — PROGRESS NOTES
Pt admitted from ED. A/O x4, VSS. IVF initiated. Tolerating clears. Denies nausea/pain. Up ad jolly. Fall precautions in place. Call light within reach. Calls appropriately. Frequent rounding. Plan for gastrografin.

## 2025-04-24 NOTE — H&P
[facility]    PATIENT'S NAME: CUATE JACQUES   ATTENDING PHYSICIAN: Jack Burden MD   PATIENT ACCOUNT#:   [id number]    LOCATION:  [address]  MEDICAL RECORD #:   [id number]       YOB: 1975  ADMISSION DATE:       04/24/2025    HISTORY AND PHYSICAL EXAMINATION    CHIEF COMPLAINT:  Colonic stricture and large bowel obstruction.    HISTORY OF PRESENT ILLNESS:  Patient is a 49-year-old  female who came into the emergency department for evaluation of abdominal cramps and loose bowel movements for the last 3 to 4 days, currently menstruating.  CBC, chemistry, and liver function tests were unremarkable.  CT scan of the abdomen showed moderate stool-filled distention of the colon with an abrupt transition in the mid sigmoid colon suggestive of colonic stricture with concentric mural thickening of the descending colon and minimal surrounding inflammatory changes, findings suggestive of stricture and colonic obstruction.  Patient will be admitted to the hospital for further management.     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  Chronic sigmoid colon stricture, has been followed by a surgeon at [facility] with current conservative management.     PAST SURGICAL HISTORY:  Diagnostic laparoscopy, nasal septoplasty and sinus surgery, bilateral breast reduction.    MEDICATIONS:  Please see medication reconciliation list.     ALLERGIES:  Sulfa.     FAMILY HISTORY:  Mother had bladder cancer.     SOCIAL HISTORY:  Ex-tobacco user.  Social alcohol.  No drug use.      REVIEW OF SYSTEMS:  Patient reports loose bowel movements for the last 2 or 3 days associated with abdominal cramps.  No fever or chills.  No nausea or vomiting.  She is moving her bowels twice a day but only liquid.  Other 12-point review of systems is negative.       PHYSICAL EXAMINATION:     GENERAL:  Alert and oriented to time, place and person.  No acute distress.   VITAL SIGNS:  Temperature 97.5, pulse 85, respiratory rate 16, blood pressure 130/90, pulse ox 99% on room air.  HEENT:  Atraumatic.  Oropharynx clear.  Dry mucous membranes.  Ears and nose normal.  Eyes:  Anicteric sclerae.   NECK:  Supple.  No lymphadenopathy.  Trachea midline.    LUNGS:  Clear to auscultation bilaterally.  Normal respiratory effort.   HEART:  Regular rate and rhythm.  S1 and S2 auscultated.  No murmur.    ABDOMEN:  Soft.  Mild distention.  No tenderness.    EXTREMITIES:  No peripheral edema, clubbing or cyanosis.   NEUROLOGIC:  Motor and sensory intact.    ASSESSMENT:  Sigmoid colon stricture with colonic obstruction as outlined above.  This could be related to prior radiation treatment versus endometriosis.      PLAN:  Patient will be admitted to general medical floor.  N.p.o. except sips of clear liquids.  IV fluids.  Pain and nausea control.  General surgery consult.  Patient prefers to follow up with her surgeon at [facility] and not to have surgical intervention over here.  We will continue to monitor her clinical status.  Further recommendations to follow.     Dictated By Richie Reid MD  d: 04/24/2025 13:25:32  t: 04/24/2025 13:42:32  Job [id number]  /   28.7

## 2025-04-24 NOTE — ED QUICK NOTES
Orders for admission, patient is aware of plan and ready to go upstairs. Any questions, please call ED RN Rosa M at extension [phone].     Patient Covid vaccination status: Fully vaccinated     COVID Test Ordered in ED: None    COVID Suspicion at Admission: N/A    Running Infusions: Medication Infusions[1]     Mental Status/LOC at time of transport: AOx4    Other pertinent information:   CIWA score: N/A   NIH score:  N/A             [1]

## 2025-04-24 NOTE — ED PROVIDER NOTES
Patient Seen in: [facility] Emergency Department      History     Chief Complaint   Patient presents with    Abdomen/Flank Pain     Stated Complaint: Abdominal pain; hx of colon stricture; N/V/D    Subjective:   HPI    49-year-old female presents for evaluation for abdominal pain nausea vomiting diarrhea.  Patient states that her symptoms began 2 days ago after eating [employer].  She started with nausea and vomiting followed by diarrhea and abdominal pain.  Patient has been drinking Pedialyte and eating applesauce and reports that the diarrhea has improved but she is continue to have abdominal pain it is coming and going.  Pain is lower.  Pain is cramping.  Pain is severe.  She does report some red blood possibly in the stool but is also on her menstrual cycle.  She has a history of colonic stricture.  History of Present Illness               Objective:     Past Medical History:    Anxiety state, unspecified    Chronic maxillary sinusitis    Deviated nasal septum    Endometriosis    Ethmoid sinusitis    Exposure to radiation    GERD (gastroesophageal reflux disease)    H/O dilation and curettage    Hypertrophy of nasal turbinates    Non Hodgkin's lymphoma (HCC)    treated with localized RT    Seasonal allergies              Past Surgical History:   Procedure Laterality Date    Excision turbinate,submucous      Nasal scopy,remv part ethmoid      Nasal scopy,rmv tiss maxill sinus      Other Left 2005    excision groin lymph node    Other  2010    ectopic pregnancy     Reduction left  2000    Reduction left  2002    Reduction right  2000    Reduction right  2002    Repair of nasal septum                  Social History     Socioeconomic History    Marital status:    Tobacco Use    Smoking status: Former     Current packs/day: 0.00     Types: Cigarettes     Start date: 1996     Quit date: 2006     Years since quittin.9    Smokeless tobacco: Former    Tobacco comments:     No household  smokers.    Vaping Use    Vaping status: Never Used   Substance and Sexual Activity    Alcohol use: Yes     Alcohol/week: 4.0 standard drinks of alcohol     Types: 4 Glasses of wine per week     Comment: Social     Drug use: No    Sexual activity: Yes     Partners: Male     Birth control/protection: I.U.D.   Other Topics Concern    Pt has a pacemaker No    Pt has a defibrillator No    Reaction to local anesthetic No     Social Drivers of Health      Received from [facility]    Housing Stability                                Physical Exam     ED Triage Vitals   BP 04/24/25 0826 (!) 123/91   Pulse 04/24/25 0826 107   Resp 04/24/25 0826 16   Temp 04/24/25 0826 97.5 °F (36.4 °C)   Temp src 04/24/25 0826 Oral   SpO2 04/24/25 0826 97 %   O2 Device 04/24/25 0900 None (Room air)       Current Vitals:   Vital Signs  BP: 131/90  Pulse: 85  Resp: 16  Temp: 97.5 °F (36.4 °C)  Temp src: Oral  MAP (mmHg): (!) 104    Oxygen Therapy  SpO2: 99 %  O2 Device: None (Room air)        Physical Exam  Vitals and nursing note reviewed.   Constitutional:       Appearance: Normal appearance.   HENT:      Head: Normocephalic and atraumatic.   Cardiovascular:      Rate and Rhythm: Normal rate and regular rhythm.      Pulses: Normal pulses.   Pulmonary:      Effort: Pulmonary effort is normal.      Breath sounds: Normal breath sounds.   Abdominal:      Tenderness: There is abdominal tenderness in the periumbilical area and left lower quadrant. There is no guarding or rebound.   Musculoskeletal:         General: Normal range of motion.      Cervical back: Normal range of motion.   Skin:     General: Skin is warm and dry.   Neurological:      General: No focal deficit present.      Mental Status: She is alert.           Physical Exam                ED Course     Labs Reviewed   COMP METABOLIC PANEL (14) - Abnormal; Notable for the following components:       Result Value    Glucose 114 (*)     All other components within  normal limits   CBC WITH DIFFERENTIAL WITH PLATELET       ED Course as of 04/24/25 1327  ------------------------------------------------------------  Time: 04/24 1317  Value: CT ABDOMEN+PELVIS(CONTRAST ONLY)(CPT=74177)  Comment: Per my independent interpretation, patient's CT Abdomen and Pelvis demonstrates a large bowel obstruction.       Results                                 MDM          Admission disposition: 4/24/2025  1:15 PM           Medical Decision Making  Differential diagnosis includes but is not limited to gastroenteritis, diverticulitis, obstruction, infectious diarrhea, etc    CBC and CMP were unremarkable.  CT imaging was concerning for a large bowel obstruction.  An attempt was made to reach patient's colorectal surgeon at [facility] and was unsuccessful.  Patient will be admitted here for further workup and treatment and is started on IV fluids.  General surgery is on consult.      Complicating factors: The patient  has a past medical history of Anxiety state, unspecified, Chronic maxillary sinusitis, Deviated nasal septum, Endometriosis, Ethmoid sinusitis, Exposure to radiation, GERD (gastroesophageal reflux disease), H/O dilation and curettage (12/2017), Hypertrophy of nasal turbinates, Non Hodgkin's lymphoma (HCC) (2005), and Seasonal allergies. and  has a past surgical history that includes repair of nasal septum; excision turbinate,submucous; nasal scopy,rmv tiss maxill sinus; nasal scopy,remv part ethmoid; other (Left, 2005); reduction left (2000); reduction left (2002); reduction right (2000); reduction right (2002); and other (2010). that contribute to the medical complexity of this ED evaluation.     Medical Record Review: I personally reviewed available prior medical records for any recent pertinent discharge summaries, testing, and procedures, and reviewed those reports.        Problems Addressed:  Large bowel obstruction (HCC): acute illness or injury with systemic  symptoms    Amount and/or Complexity of Data Reviewed  External Data Reviewed: notes.     Details: Patient's visit with colorectal surgery on November 20, 2024 reviewed.  Patient was diagnosed with a sigmoid colon stricture likely secondary to endometriosis.  She has a history of sigmoid diverticulitis as well as non-Hodgkin's lymphoma.  Labs: ordered. Decision-making details documented in ED Course.  Radiology: ordered and independent interpretation performed. Decision-making details documented in ED Course.  Discussion of management or test interpretation with external provider(s): Dr. Jcaobson with general surgery accepts consult and Dr. Reid accepts admission.    Risk  Parenteral controlled substances.  Decision regarding hospitalization.  Risk Details: IV morphine        Disposition and Plan     Clinical Impression:  1. Large bowel obstruction (HCC)         Disposition:  Admit  4/24/2025  1:15 pm    Follow-up:  No follow-up provider specified.        Medications Prescribed:  Current Discharge Medication List          Supplementary Documentation:         Hospital Problems       Present on Admission  Date Reviewed: 2/23/2025   None

## 2025-04-24 NOTE — CONSULTS
Is this a shared or split note between Advanced Practice Provider and Physician? Yes    [facility]  Report of Consultation    [place] Boudreaux Patient Status:  Emergency    1975 MRN [id number]   Location [facility] EMERGENCY DEPARTMENT Attending Jack Burden R*   Hosp Day # 0 PCP Hernandez Rousseau MD     Requesting Physician:  Dr. Burden    Reason for Consultation:  Large bowel obstruction, history of colonic stricture    Chief Complaint:  Abdominal pain, nausea, vomiting    History of Present Illness:  [place] Boudreaux is a 49 year old female who presents to [facility] on 2025 with complaints of lower abdominal pain, nausea, and vomiting that began 2 days ago. Patient reports pain began in her lower abdomen as a cramping sensation, followed by nausea, vomiting, and episode of diarrhea. Patient denies fever, chills, constipation. She believed the had the stomach flu. Abdominal pain has worsened in severity since time of onset, with no relief.  Last bowel movement was overnight.    Patient has a known history of sigmoid colonic stricture, diagnosed several years ago. MRI imaging in  revealed deep pelvic endometriosis with sigmoid colon involvement, believed to be involved with the bowel stricture. She notes she has had lower abdominal cramping sensations in the past which were relieved once she started taking Wegovy. Patient has seen a colorectal surgeon at [facility] in the past to discuss potential need for sigmoid colectomy. Patient reports if she requires surgery, she would prefer to have surgery at [facility].     Upon presentation to the hospital, patient was afebrile and hemodynamically stable. Labs wnl -WBC 9.5, hemoglobin 14, hematocrit 42.7, BUN 13, creatinine 0.85.  CT abdomen and pelvis showed moderate stool-filled distention of the colon with an abrupt transition in the mid sigmoid colon.  Bowel distal to this area of transition is  decompressed.  Associated with very slight concentric mural thickening of the descending colon with minimal surrounding inflammatory changes.  Most compatible with large bowel obstruction secondary to colonic stricture.    Upon admission to the ED, patient reports pain has improved with analgesics.    Past medical history includes history explained above, non-hodkin's lymphoma requiring chemotherapy and abdominal external beam radiotherapy in .     Past abdominal surgical history includes  and tubal ligation.     Family history is significant for mother with bladder cancer.    History:  Past Medical History[1]  Past Surgical History[2]  Family History[3]   reports that she quit smoking about 18 years ago. Her smoking use included cigarettes. She started smoking about 28 years ago. She has quit using smokeless tobacco. She reports current alcohol use of about 4.0 standard drinks of alcohol per week. She reports that she does not use drugs.    Allergies:  Allergies[4]    Medications:  Prescriptions Prior to Admission[5]    Current Hospital Medications[6]    Review of Systems:  Review of Systems   Constitutional:  Negative for chills, fatigue and fever.   Gastrointestinal:  Positive for nausea, vomiting, abdominal pain and diarrhea. Negative for constipation and abdominal distention.       Physical Exam:  /90   Pulse 85   Temp 97.5 °F (36.4 °C) (Oral)   Resp 16   Ht 5' 8\" (1.727 m)   Wt 190 lb (86.2 kg)   LMP 2025 (Exact Date)   SpO2 99%   BMI 28.89 kg/m²   Physical Exam  Constitutional:       Appearance: Normal appearance. She is normal weight.   HENT:      Head: Normocephalic.   Eyes:      Pupils: Pupils are equal, round, and reactive to light.   Cardiovascular:      Rate and Rhythm: Normal rate and regular rhythm.   Pulmonary:      Effort: Pulmonary effort is normal.   Abdominal:      General: Abdomen is flat. There is no distension.      Palpations: Abdomen is soft.      Tenderness:  There is no guarding or rebound.      Comments: Mild tenderness to palpation to the LLQ and suprapubic region. Exam limited due to analgesics.    Skin:     General: Skin is warm and dry.   Neurological:      General: No focal deficit present.      Mental Status: She is alert and oriented to person, place, and time.         Laboratory Data:  Recent Labs   Lab 04/24/25  0853   RBC 4.78   HGB 14.0   HCT 42.7   MCV 89.3   MCH 29.3   MCHC 32.8   RDW 13.1   NEPRELIM 7.25   WBC 9.5   .0       Recent Labs   Lab 04/24/25  0853   *   BUN 13   CREATSERUM 0.85   CA 8.9   ALB 4.4      K 3.8      CO2 26.0   ALKPHO 82   AST 21   ALT 20   BILT 0.5   TP 7.1         No results for input(s): \"PTP\", \"INR\", \"PTT\" in the last 168 hours.      CT ABDOMEN+PELVIS(CONTRAST ONLY)(CPT=74177)  Result Date: 4/24/2025  CONCLUSION:   1. Moderate stool filled distention of the colon with an abrupt transition in the mid sigmoid colon.  The bowel distal to this area of transition is decompressed.  The findings are associated with very slight concentric mural thickening of the descending  colon with minimal surrounding inflammatory changes.  The combination of findings is most compatible with large bowel obstruction secondary to colonic stricture.  A superimposed mild colitis of the descending colon is suspected.  The small bowel and appendix appear grossly normal.    2. Incidental nonacute findings are also present and are described within the body of the report.    Dictated by (CST): Micah Gupta MD on 4/24/2025 at 10:13 AM     Finalized by (CST): Micah Gupta MD on 4/24/2025 at 10:17 AM                      Medical Decision Making         Impression:  Problem List[7]    Large bowel obstruction secondary to history of colonic stricture  Endometriosis    Plan:  No plans for acute surgical intervention at this time. Will manage obstruction conservatively with bowel rest and serial imaging. Discussed need for surgery if  imaging or exam indicates worsening obstruction.   Awaiting response from colorectal surgery department at [facility] to see if they would accept transfer.   Continue bowel rest with NPO, okay for sips of clears and ice chips. Continue IV fluids.  Analgesics and antiemetics as needed.   Dr. Jacobson to evaluate patient and provide further surgical recommendations.     Melody Boo PA-C  4/24/2025  1:35 PM      The patient was independently examined. Agree with the PA's assessment and plan.  CT and labs reviewed.  There is mild upstream dilation of the descending colon proximal to the known sigmoid stricture and the patient's abdominal exam is entirely benign.  She denies nausea.  She has minimal tenderness to the left side of the abdomen.  She is passing some gas.  At this time my concern for surgical abdomen is very low.  Okay for clear liquid diet.  Plan for Gastrografin enema for therapeutic and diagnostic purposes.  The patient prefers to follow-up with her surgeon at [facility] regarding surgery which seems to be a viable option at this time.      More than 50% of clinical time and 100% MDM was performed by me.     Myranda Jacobson MD  [facility] - General Surgery   [address] [place] [address]  p [phone]          [1]   Past Medical History:   Anxiety state, unspecified    Chronic maxillary sinusitis    Deviated nasal septum    Endometriosis    Ethmoid sinusitis    Exposure to radiation    GERD (gastroesophageal reflux disease)    H/O dilation and curettage    Hypertrophy of nasal turbinates    Non Hodgkin's lymphoma (HCC)    treated with localized RT    Seasonal allergies   [2]   Past Surgical History:  Procedure Laterality Date    Excision turbinate,submucous      Nasal scopy,remv part ethmoid      Nasal scopy,rmv tiss maxill sinus      Other Left 2005    excision groin lymph node    Other  2010    ectopic pregnancy     Reduction left  2000    Reduction left  2002     Reduction right  2000    Reduction right  2002    Repair of nasal septum     [3]   Family History  Problem Relation Age of Onset    Cancer Mother 79        bladder; non-smoker    Diabetes Maternal Grandmother     Heart Disorder Maternal Grandfather     Breast Cancer Paternal Grandmother         20s, lived til age 88    Hypertension Half-Brother     Cancer Self 29        non hodgkins lymphoma     Cancer Maternal Aunt 83        brain cancer    Cancer Paternal Uncle         unknown type   [4]   Allergies  Allergen Reactions    Sulfa Antibiotics HIVES   [5] (Not in a hospital admission)  [6]   Current Facility-Administered Medications:     LORazepam (Ativan) 2 mg/mL injection 0.5 mg, 0.5 mg, Intravenous, Once  [7]   Patient Active Problem List  Diagnosis    Abdominal pain, unspecified location    Seasonal allergic rhinitis due to other allergic trigger, unspecified chronicity    Chronic sinusitis, unspecified location    History of non-Hodgkin's lymphoma    Colon stricture (HCC)    Endometriosis    Depression    Gastroesophageal reflux disease without esophagitis    Anxiety    Cholelithiasis    LLQ pain    Migraine without aura    Non-Hodgkin's lymphoma (HCC)    Pulmonary infiltrate in right lung on chest x-ray    Vitamin D deficiency    Weight gain    Hx of migraines    Encounter for therapeutic drug monitoring    Hypercholesterolemia    GERD (gastroesophageal reflux disease)    Vertigo    Hx of obesity    Patient overweight    Obesity (BMI 30-39.9)    Family history of malignant neoplasm of breast

## 2025-04-24 NOTE — ED QUICK NOTES
Attempted to administer Ativan which pt requested for anxiety, pt declined at this time stating her son is having an allergic reaction at school and she wants to \"stay alert\"

## 2025-04-24 NOTE — ED INITIAL ASSESSMENT (HPI)
Patient presents with abdominal pain for the past two days. Patient has history of colonic stricture.

## 2025-04-25 ENCOUNTER — APPOINTMENT (OUTPATIENT)
Dept: GENERAL RADIOLOGY | Facility: HOSPITAL | Age: 50
End: 2025-04-25
Attending: STUDENT IN AN ORGANIZED HEALTH CARE EDUCATION/TRAINING PROGRAM
Payer: COMMERCIAL

## 2025-04-25 LAB
ANION GAP SERPL CALC-SCNC: 6 MMOL/L (ref 0–18)
BASOPHILS # BLD AUTO: 0.02 X10(3) UL (ref 0–0.2)
BASOPHILS NFR BLD AUTO: 0.3 %
BUN BLD-MCNC: 6 MG/DL (ref 9–23)
BUN/CREAT SERPL: 8.3 (ref 10–20)
CALCIUM BLD-MCNC: 8.4 MG/DL (ref 8.7–10.4)
CHLORIDE SERPL-SCNC: 109 MMOL/L (ref 98–112)
CO2 SERPL-SCNC: 27 MMOL/L (ref 21–32)
CREAT BLD-MCNC: 0.72 MG/DL (ref 0.55–1.02)
DEPRECATED RDW RBC AUTO: 43.1 FL (ref 35.1–46.3)
EGFRCR SERPLBLD CKD-EPI 2021: 104 ML/MIN/1.73M2 (ref 60–?)
EOSINOPHIL # BLD AUTO: 0.12 X10(3) UL (ref 0–0.7)
EOSINOPHIL NFR BLD AUTO: 2.1 %
ERYTHROCYTE [DISTWIDTH] IN BLOOD BY AUTOMATED COUNT: 13.2 % (ref 11–15)
GLUCOSE BLD-MCNC: 98 MG/DL (ref 70–99)
HCT VFR BLD AUTO: 34.5 % (ref 35–48)
HGB BLD-MCNC: 11.3 G/DL (ref 12–16)
IMM GRANULOCYTES # BLD AUTO: 0.01 X10(3) UL (ref 0–1)
IMM GRANULOCYTES NFR BLD: 0.2 %
LYMPHOCYTES # BLD AUTO: 1.48 X10(3) UL (ref 1–4)
LYMPHOCYTES NFR BLD AUTO: 25.4 %
MCH RBC QN AUTO: 29.4 PG (ref 26–34)
MCHC RBC AUTO-ENTMCNC: 32.8 G/DL (ref 31–37)
MCV RBC AUTO: 89.8 FL (ref 80–100)
MONOCYTES # BLD AUTO: 0.53 X10(3) UL (ref 0.1–1)
MONOCYTES NFR BLD AUTO: 9.1 %
NEUTROPHILS # BLD AUTO: 3.66 X10 (3) UL (ref 1.5–7.7)
NEUTROPHILS # BLD AUTO: 3.66 X10(3) UL (ref 1.5–7.7)
NEUTROPHILS NFR BLD AUTO: 62.9 %
OSMOLALITY SERPL CALC.SUM OF ELEC: 292 MOSM/KG (ref 275–295)
PLATELET # BLD AUTO: 239 10(3)UL (ref 150–450)
POTASSIUM SERPL-SCNC: 3.9 MMOL/L (ref 3.5–5.1)
RBC # BLD AUTO: 3.84 X10(6)UL (ref 3.8–5.3)
SODIUM SERPL-SCNC: 142 MMOL/L (ref 136–145)
WBC # BLD AUTO: 5.8 X10(3) UL (ref 4–11)

## 2025-04-25 PROCEDURE — 99232 SBSQ HOSP IP/OBS MODERATE 35: CPT | Performed by: STUDENT IN AN ORGANIZED HEALTH CARE EDUCATION/TRAINING PROGRAM

## 2025-04-25 PROCEDURE — 99233 SBSQ HOSP IP/OBS HIGH 50: CPT | Performed by: INTERNAL MEDICINE

## 2025-04-25 PROCEDURE — 74270 X-RAY XM COLON 1CNTRST STD: CPT | Performed by: STUDENT IN AN ORGANIZED HEALTH CARE EDUCATION/TRAINING PROGRAM

## 2025-04-25 RX ORDER — POLYETHYLENE GLYCOL 3350 17 G/17G
17 POWDER, FOR SOLUTION ORAL DAILY
Status: DISCONTINUED | OUTPATIENT
Start: 2025-04-25 | End: 2025-04-26

## 2025-04-25 NOTE — PLAN OF CARE
Patient came in for a bowel obstruction, she was on a clear liquid diet but has been NPO since midnight for a planned x-ray with enema tomorrow. Currently on menstrual cycle. Has history of anxiety gave one time dose of ativan and has xanax as needed.    Problem: Patient Centered Care  Goal: Patient preferences are identified and integrated in the patient's plan of care  Description: Interventions:- What would you like us to know as we care for you? Keep family updated on plan of care- Provide timely, complete, and accurate information to patient/family- Incorporate patient and family knowledge, values, beliefs, and cultural backgrounds into the planning and delivery of care- Encourage patient/family to participate in care and decision-making at the level they choose- Honor patient and family perspectives and choices  Outcome: Progressing     Problem: Patient/Family Goals  Goal: Patient/Family Long Term Goal  Description: Patient's Long Term Goal: Interventions:- - See additional Care Plan goals for specific interventions  Outcome: Progressing  Goal: Patient/Family Short Term Goal  Description: Patient's Short Term Goal: Interventions: - - See additional Care Plan goals for specific interventions  Outcome: Progressing

## 2025-04-25 NOTE — PAYOR COMM NOTE
--------------  ADMISSION REVIEW     Payor: DARREN BENAVIDEZ  Subscriber #:  [id number]  Authorization Number: [id number]    Admit date: 4/24/25  Admit time:  2:24 PM       REVIEW DOCUMENTATION:     ED Provider Notes        Subjective:   HPI    49-year-old female presents for evaluation for abdominal pain nausea vomiting diarrhea.  Patient states that her symptoms began 2 days ago after eating [employer].  She started with nausea and vomiting followed by diarrhea and abdominal pain.  Patient has been drinking Pedialyte and eating applesauce and reports that the diarrhea has improved but she is continue to have abdominal pain it is coming and going.  Pain is lower.  Pain is cramping.  Pain is severe.  She does report some red blood possibly in the stool but is also on her menstrual cycle.  She has a history of colonic stricture.  History of Present Illness          Physical Exam     ED Triage Vitals   BP 04/24/25 0826 (!) 123/91   Pulse 04/24/25 0826 107   Resp 04/24/25 0826 16   Temp 04/24/25 0826 97.5 °F (36.4 °C)   Temp src 04/24/25 0826 Oral   SpO2 04/24/25 0826 97 %   O2 Device 04/24/25 0900 None (Room air)       Current Vitals:   Vital Signs  BP: 131/90  Pulse: 85  Resp: 16  Temp: 97.5 °F (36.4 °C)  Temp src: Oral  MAP (mmHg): (!) 104      Physical Exam  Vitals and nursing note reviewed.   Constitutional:       Appearance: Normal appearance.   HENT:      Head: Normocephalic and atraumatic.   Cardiovascular:      Rate and Rhythm: Normal rate and regular rhythm.      Pulses: Normal pulses.   Pulmonary:      Effort: Pulmonary effort is normal.      Breath sounds: Normal breath sounds.   Abdominal:      Tenderness: There is abdominal tenderness in the periumbilical area and left lower quadrant. There is no guarding or rebound.   Musculoskeletal:         General: Normal range of motion.      Cervical back: Normal range of motion.   Skin:     General: Skin is warm and dry.   Neurological:      General: No focal deficit  present.      Mental Status: She is alert.          ED Course as of 04/24/25 1327  ------------------------------------------------------------  Time: 04/24 1317  Value: CT ABDOMEN+PELVIS(CONTRAST ONLY)(CPT=74177)  Comment: Per my independent interpretation, patient's CT Abdomen and Pelvis demonstrates a large bowel obstruction.    MDM          Admission disposition: 4/24/2025  1:15 PM    Medical Decision Making  Differential diagnosis includes but is not limited to gastroenteritis, diverticulitis, obstruction, infectious diarrhea, etc    CBC and CMP were unremarkable.  CT imaging was concerning for a large bowel obstruction.  An attempt was made to reach patient's colorectal surgeon at [facility] and was unsuccessful.  Patient will be admitted here for further workup and treatment and is started on IV fluids.  General surgery is on consult.      Complicating factors: The patient  has a past medical history of Anxiety state, unspecified, Chronic maxillary sinusitis, Deviated nasal septum, Endometriosis, Ethmoid sinusitis, Exposure to radiation, GERD (gastroesophageal reflux disease), H/O dilation and curettage (12/2017), Hypertrophy of nasal turbinates, Non Hodgkin's lymphoma (HCC) (2005), and Seasonal allergies. and  has a past surgical history that includes repair of nasal septum; excision turbinate,submucous; nasal scopy,rmv tiss maxill sinus; nasal scopy,remv part ethmoid; other (Left, 2005); reduction left (2000); reduction left (2002); reduction right (2000); reduction right (2002); and other (2010). that contribute to the medical complexity of this ED evaluation.       Problems Addressed:  Large bowel obstruction (HCC): acute illness or injury with systemic symptoms    Amount and/or Complexity of Data Reviewed  External Data Reviewed: notes.     Details: Patient's visit with colorectal surgery on November 20, 2024 reviewed.  Patient was diagnosed with a sigmoid colon stricture likely secondary to  endometriosis.  She has a history of sigmoid diverticulitis as well as non-Hodgkin's lymphoma.  Labs: ordered. Decision-making details documented in ED Course.  Radiology: ordered and independent interpretation performed. Decision-making details documented in ED Course.  Discussion of management or test interpretation with external provider(s): Dr. Jacobson with general surgery accepts consult and Dr. Reid accepts admission.    Risk  Parenteral controlled substances.  Decision regarding hospitalization.  Risk Details: IV morphine        Disposition and Plan     Clinical Impression:  1. Large bowel obstruction (HCC)         Disposition:  Admit  4/24/2025  1:15 pm        HISTORY AND PHYSICAL EXAMINATION     CHIEF COMPLAINT:  Colonic stricture and large bowel obstruction.     HISTORY OF PRESENT ILLNESS:  Patient is a 49-year-old  female who came into the emergency department for evaluation of abdominal cramps and loose bowel movements for the last 3 to 4 days, currently menstruating.  CBC, chemistry, and liver function tests were unremarkable.  CT scan of the abdomen showed moderate stool-filled distention of the colon with an abrupt transition in the mid sigmoid colon suggestive of colonic stricture with concentric mural thickening of the descending colon and minimal surrounding inflammatory changes, findings suggestive of stricture and colonic obstruction.  Patient will be admitted to the hospital for further management.      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  Chronic sigmoid colon stricture, has been followed by a surgeon at [facility] with current conservative management.       ASSESSMENT:  Sigmoid colon stricture with colonic obstruction as outlined above.  This could be related to prior radiation treatment versus endometriosis.       PLAN:  Patient will be admitted to  general medical floor.  N.p.o. except sips of clear liquids.  IV fluids.  Pain and nausea control.  General surgery consult.  Patient prefers to follow up with her surgeon at [facility] and not to have surgical intervention over here.  We will continue to monitor her clinical status.  Further recommendations to follow.      Dictated By Richie Reid MD    4/25 SURGERY:     Subjective:  Patient is resting comfortably in bed this morning.  Patient reports minimal lower abdominal discomfort, improvement since yesterday.  Denies nausea or vomiting.  Tolerated clear liquids yesterday.  Reports passed flatus and small formed bowel movement yesterday.     Objective/Physical Exam:  General: Alert, orientated x3.  Cooperative.  No apparent distress.  Vital Signs:  Blood pressure 97/66, pulse 64, temperature 97.9 °F (36.6 °C), temperature source Oral, resp. rate 14, height 5' 8\" (1.727 m), weight 197 lb 1.6 oz (89.4 kg), last menstrual period 04/24/2025, SpO2 100%, not currently breastfeeding.      Wt Readings from Last 3 Encounters:   04/24/25 197 lb 1.6 oz (89.4 kg)   02/19/25 195 lb 9.6 oz (88.7 kg)   06/25/24 205 lb (93 kg)      Lungs: No respiratory distress.  Cardiac: Regular rate and rhythm.   Abdomen:  Soft, non-distended, nontender, with no rebound or guarding.  No peritoneal signs.   Extremities:  No lower extremity edema noted.       Intake/Output:     Intake/Output Summary (Last 24 hours) at 4/25/2025 0854  Last data filed at 4/25/2025 0521      Gross per 24 hour   Intake 1163.7 ml   Output --   Net 1163.7 ml       Labs:        Lab Results   Component Value Date     WBC 5.8 04/25/2025     HGB 11.3 04/25/2025     HCT 34.5 04/25/2025     .0 04/25/2025            Lab Results   Component Value Date      04/25/2025     K 3.9 04/25/2025      04/25/2025     CO2 27.0 04/25/2025     BUN 6 04/25/2025     CREATSERUM 0.72 04/25/2025     GLU 98 04/25/2025     CA 8.4 04/25/2025      No results found for:  \"PT\", \"INR\"        Assessment  [Problem List]    [Problem List]  Patient Active Problem List      Diagnosis    Abdominal pain, unspecified location    Seasonal allergic rhinitis due to other allergic trigger, unspecified chronicity    Chronic sinusitis, unspecified location    History of non-Hodgkin's lymphoma    Colon stricture (HCC)    Endometriosis    Depression    Gastroesophageal reflux disease without esophagitis    Anxiety    Cholelithiasis    LLQ pain    Migraine without aura    Non-Hodgkin's lymphoma (HCC)    Pulmonary infiltrate in right lung on chest x-ray    Vitamin D deficiency    Weight gain    Hx of migraines    Encounter for therapeutic drug monitoring    Hypercholesterolemia    GERD (gastroesophageal reflux disease)    Vertigo    Hx of obesity    Patient overweight    Obesity (BMI 30-39.9)    Family history of malignant neoplasm of breast    Large bowel obstruction (HCC)        Large bowel obstruction secondary to history of colonic stricture  Endometriosis     Plan:  Plan for gastrografin enema abdominal x-ray today.  Clinical course pending results  NPO for imaging.  Okay for clear liquids following imaging if obstruction is resolving  Analgesics and antiemetics as needed  Ambulate and up to chair  DVT prophylaxis with SCDs  GI prophylaxis with Protonix     Quality:  DVT Mechanical Prophylaxis:   SCDs,    DVT Pharmacologic Prophylaxis   Medication   None                 Code Status: Full Code  Cyr: No urinary catheter in place  Cyr Duration (in days):   Central line:    YAQUELIN: 4/26/2025           Melody Boo PA-C  4/25/2025  8:54 AM           MEDICATIONS ADMINISTERED IN LAST 1 DAY:  cetirizine (ZyrTEC) tab 5 mg       Date Action Dose Route User    4/24/2025 2024 Given 5 mg Oral Kalina Gross RN          iopamidol 76% (ISOVUE-370) injection for power injector       Date Action Dose Route User    4/24/2025 0955 Given 80 mL Intravenous Seb López          potassium chloride 20 mEq in  dextrose 5%-sodium chloride 0.45% 1000mL infusion premix       Date Action Dose Route User    4/25/2025 0019 New Bag (none) Intravenous Michelle Middleton RN    4/24/2025 1452 New Bag (none) Intravenous Deanna Rose RN          LORazepam (Ativan) 2 mg/mL injection 0.5 mg       Date Action Dose Route User    4/24/2025 2024 Given 0.5 mg Intravenous Kalina Gross RN          morphINE PF 4 MG/ML injection 4 mg       Date Action Dose Route User    4/24/2025 0912 Given 4 mg Intravenous Rosa M Alvarez RN          ondansetron (Zofran) 4 MG/2ML injection 4 mg       Date Action Dose Route User    4/24/2025 0912 Given 4 mg Intravenous Rosa M Alvarez RN          pantoprazole (Protonix) DR tab 40 mg       Date Action Dose Route User    4/25/2025 0521 Given 40 mg Oral Kalina Gross RN          sodium chloride 0.9 % IV bolus 1,000 mL       Date Action Dose Route User    4/24/2025 0916 New Bag 1,000 mL Intravenous Rosa M Alvarez RN          topiramate (TopaMAX) tab 50 mg       Date Action Dose Route User    4/24/2025 2024 Given 50 mg Oral Kalina Gross RN            Vitals (last day)       Date/Time Temp Pulse Resp BP SpO2 Weight O2 Device O2 Flow Rate (L/min) [facility]    04/25/25 0630 97.9 °F (36.6 °C) 64 14 97/66 100 % -- None (Room air) -- KG    04/24/25 2021 98.2 °F (36.8 °C) 76 16 107/70 99 % -- None (Room air) -- EF    04/24/25 1425 -- -- -- -- -- 197 lb 1.6 oz (89.4 kg) -- -- CW    04/24/25 1424 97.9 °F (36.6 °C) 83 16 124/84 95 % -- None (Room air) --     04/24/25 1315 -- 77 16 119/98 98 % -- None (Room air) -- EH    04/24/25 1300 -- 85 -- 131/90 99 % -- None (Room air) --     04/24/25 1245 -- 75 16 102/67 97 % -- None (Room air) --     04/24/25 1230 -- 79 -- 111/81 98 % -- None (Room air) --     04/24/25 0900 -- 82 16 117/99 98 % -- None (Room air) --     04/24/25 0826 97.5 °F (36.4 °C) 107 16 123/91 97 % 190 lb (86.2 kg) -- -- BC

## 2025-04-25 NOTE — PLAN OF CARE
No acute changes today. Went down to XR for gastrografin enema this afternoon. Diet advanced to low fiber soft per surg. No complaints of nausea or pain. IVF infusing. Up independently. Call light within reach, frequent rounding.

## 2025-04-25 NOTE — PROGRESS NOTES
[facility]  part of [facility]    Hospitalist Progress Note     [place] Boudreaux Patient Status:  Inpatient    1975  49 year old CSN [id number]   Location 452/452-A Attending Murtaza Woody MD   Hosp Day # 1 PCP Hernandez Rousseau MD         Subjective:   ----------------------------------  Pt resting in bed in mild distress due to abdominal pain. Had small formed BM this morning. No N/V.       Objective:   Chief Complaint:   Chief Complaint   Patient presents with    Abdomen/Flank Pain     ----------------------------------  Temp:  [97.9 °F (36.6 °C)-98.2 °F (36.8 °C)] 98.1 °F (36.7 °C)  Pulse:  [64-83] 65  Resp:  [14-16] 16  BP: ()/(66-84) 104/79  SpO2:  [95 %-100 %] 97 %  Gen: A+Ox3.  No distress.   HEENT: NCAT, neck supple, no carotid bruit.  CV: RRR, S1S2, and intact distal pulses. No gallop, rub, murmur.  Pulm: Effort and breath sounds normal. No distress, wheezes, rales, rhonchi.  Abd: Soft, +tenderness, ND, decr BS , no mass, no HSM, no rebound/guarding.   Neuro: Normal reflexes, CN. Sensory/motor exams grossly normal deficit.   MS: No joint effusions.  No peripheral edema.  Skin: Skin is warm and dry. No rashes, erythema, diaphoresis.   Psych: Normal mood and affect. Calm, cooperative    Labs:  Lab Results   Component Value Date    HGB 11.3 (L) 2025    WBC 5.8 2025    .0 2025     2025    K 3.9 2025    CREATSERUM 0.72 2025    AST 21 2025    ALT 20 2025           Scheduled Medications[1]  PRN Medications[2]      Other problems    Supplementary Documentation:   DVT Mechanical Prophylaxis:   SCDs,    DVT Pharmacologic Prophylaxis   Medication   None                Code Status: Full Code  Cyr: No urinary catheter in place  Cyr Duration (in days):   Central line:    YAQUELIN: 2025         I personally reviewed the available laboratories, imaging including. I discussed/will discuss the case with consultants. I ordered  laboratories and/or radiographic studies. I adjusted medications as detailed above.  Medical decision making high, risk is high.    Assessment & Plan:   ----------------------------------    Sigmoid colon stricture with colonic obstruction   This could be related to prior radiation treatment versus endometriosis  NPO  Cont IVF  Pain control  Gen surgery consult  Plan for gastrografin enema abdominal x-ray today.      FULL CODE  DVT px: SCDs    IBRAHIMAojeane Woody MD  4/25/2025         [1]    buPROPion ER  150 mg Oral Daily    escitalopram  10 mg Oral Daily    pantoprazole  40 mg Oral QAM AC    fluticasone propionate  2 spray Each Nare Daily    cetirizine  5 mg Oral Nightly    topiramate  50 mg Oral Nightly   [2]   acetaminophen    ondansetron    prochlorperazine    temazepam    morphINE **OR** morphINE **OR** morphINE    ALPRAZolam

## 2025-04-25 NOTE — PROGRESS NOTES
[facility]  Progress Note    [place] Boudreaux Patient Status:  Inpatient    1975 MRN [id number]   Location [facility] 4W/SW/SE Attending Murtaza Woody MD   Hosp Day # 1 PCP Hernandez Rousseau MD     Subjective:  Patient is resting comfortably in bed this morning.  Patient reports minimal lower abdominal discomfort, improvement since yesterday.  Denies nausea or vomiting.  Tolerated clear liquids yesterday.  Reports passed flatus and small formed bowel movement yesterday.    Objective/Physical Exam:  General: Alert, orientated x3.  Cooperative.  No apparent distress.  Vital Signs:  Blood pressure 97/66, pulse 64, temperature 97.9 °F (36.6 °C), temperature source Oral, resp. rate 14, height 5' 8\" (1.727 m), weight 197 lb 1.6 oz (89.4 kg), last menstrual period 2025, SpO2 100%, not currently breastfeeding.  Wt Readings from Last 3 Encounters:   25 197 lb 1.6 oz (89.4 kg)   25 195 lb 9.6 oz (88.7 kg)   24 205 lb (93 kg)     Lungs: No respiratory distress.  Cardiac: Regular rate and rhythm.   Abdomen:  Soft, non-distended, nontender, with no rebound or guarding.  No peritoneal signs.   Extremities:  No lower extremity edema noted.      Intake/Output:    Intake/Output Summary (Last 24 hours) at 2025 0854  Last data filed at 2025 0521  Gross per 24 hour   Intake 1163.7 ml   Output --   Net 1163.7 ml     I/O last 3 completed shifts:  In: 1163.7 [P.O.:240; I.V.:923.7]  Out: -   No intake/output data recorded.    Medications: Scheduled Medications[1]    Labs:  Lab Results   Component Value Date    WBC 5.8 2025    HGB 11.3 2025    HCT 34.5 2025    .0 2025     Lab Results   Component Value Date     2025    K 3.9 2025     2025    CO2 27.0 2025    BUN 6 2025    CREATSERUM 0.72 2025    GLU 98 2025    CA 8.4 2025     No results found for: \"PT\", \"INR\"      Assessment  Problem  List[2]    Large bowel obstruction secondary to history of colonic stricture  Endometriosis    Plan:  Plan for gastrografin enema abdominal x-ray today.  Clinical course pending results  NPO for imaging.  Okay for clear liquids following imaging if obstruction is resolving  Analgesics and antiemetics as needed  Ambulate and up to chair  DVT prophylaxis with SCDs  GI prophylaxis with Protonix    Quality:  DVT Mechanical Prophylaxis:   SCDs,    DVT Pharmacologic Prophylaxis   Medication   None                Code Status: Full Code  Cyr: No urinary catheter in place  Cyr Duration (in days):   Central line:    YAQUELIN: 4/26/2025        Melody Boo PA-C  4/25/2025  8:54 AM        The patient was independently examined. Agree with the PA's assessment and plan. Plan for GGE today. If no significant pathologic findings, ok to resume diet, start from fulls to soft diet. Anticipate discharge home potentially later today vs tomorrow given clinical improvement and minimal symptoms.       More than 50% of clinical time and 100% MDM was performed by me.     Myranda Jacobson MD  [facility] - General Surgery   [address] [place] [address]  p [phone]        [1]    buPROPion ER  150 mg Oral Daily    escitalopram  10 mg Oral Daily    pantoprazole  40 mg Oral QAM AC    fluticasone propionate  2 spray Each Nare Daily    cetirizine  5 mg Oral Nightly    topiramate  50 mg Oral Nightly   [2]   Patient Active Problem List  Diagnosis    Abdominal pain, unspecified location    Seasonal allergic rhinitis due to other allergic trigger, unspecified chronicity    Chronic sinusitis, unspecified location    History of non-Hodgkin's lymphoma    Colon stricture (HCC)    Endometriosis    Depression    Gastroesophageal reflux disease without esophagitis    Anxiety    Cholelithiasis    LLQ pain    Migraine without aura    Non-Hodgkin's lymphoma (HCC)    Pulmonary infiltrate in right lung on chest x-ray    Vitamin D  deficiency    Weight gain    Hx of migraines    Encounter for therapeutic drug monitoring    Hypercholesterolemia    GERD (gastroesophageal reflux disease)    Vertigo    Hx of obesity    Patient overweight    Obesity (BMI 30-39.9)    Family history of malignant neoplasm of breast    Large bowel obstruction (HCC)

## 2025-04-26 VITALS
DIASTOLIC BLOOD PRESSURE: 75 MMHG | WEIGHT: 197.13 LBS | BODY MASS INDEX: 29.88 KG/M2 | HEIGHT: 68 IN | RESPIRATION RATE: 20 BRPM | SYSTOLIC BLOOD PRESSURE: 101 MMHG | HEART RATE: 76 BPM | TEMPERATURE: 97 F | OXYGEN SATURATION: 98 %

## 2025-04-26 LAB
ANION GAP SERPL CALC-SCNC: 7 MMOL/L (ref 0–18)
BASOPHILS # BLD AUTO: 0.03 X10(3) UL (ref 0–0.2)
BASOPHILS NFR BLD AUTO: 0.6 %
BUN BLD-MCNC: 7 MG/DL (ref 9–23)
BUN/CREAT SERPL: 9.2 (ref 10–20)
CALCIUM BLD-MCNC: 8.3 MG/DL (ref 8.7–10.4)
CHLORIDE SERPL-SCNC: 108 MMOL/L (ref 98–112)
CO2 SERPL-SCNC: 27 MMOL/L (ref 21–32)
CREAT BLD-MCNC: 0.76 MG/DL (ref 0.55–1.02)
DEPRECATED RDW RBC AUTO: 43.1 FL (ref 35.1–46.3)
EGFRCR SERPLBLD CKD-EPI 2021: 97 ML/MIN/1.73M2 (ref 60–?)
EOSINOPHIL # BLD AUTO: 0.11 X10(3) UL (ref 0–0.7)
EOSINOPHIL NFR BLD AUTO: 2.1 %
ERYTHROCYTE [DISTWIDTH] IN BLOOD BY AUTOMATED COUNT: 13 % (ref 11–15)
GLUCOSE BLD-MCNC: 98 MG/DL (ref 70–99)
HCT VFR BLD AUTO: 35.6 % (ref 35–48)
HGB BLD-MCNC: 11.4 G/DL (ref 12–16)
IMM GRANULOCYTES # BLD AUTO: 0.02 X10(3) UL (ref 0–1)
IMM GRANULOCYTES NFR BLD: 0.4 %
LYMPHOCYTES # BLD AUTO: 1.19 X10(3) UL (ref 1–4)
LYMPHOCYTES NFR BLD AUTO: 23.1 %
MCH RBC QN AUTO: 29 PG (ref 26–34)
MCHC RBC AUTO-ENTMCNC: 32 G/DL (ref 31–37)
MCV RBC AUTO: 90.6 FL (ref 80–100)
MONOCYTES # BLD AUTO: 0.46 X10(3) UL (ref 0.1–1)
MONOCYTES NFR BLD AUTO: 8.9 %
NEUTROPHILS # BLD AUTO: 3.34 X10 (3) UL (ref 1.5–7.7)
NEUTROPHILS # BLD AUTO: 3.34 X10(3) UL (ref 1.5–7.7)
NEUTROPHILS NFR BLD AUTO: 64.9 %
OSMOLALITY SERPL CALC.SUM OF ELEC: 292 MOSM/KG (ref 275–295)
PLATELET # BLD AUTO: 252 10(3)UL (ref 150–450)
POTASSIUM SERPL-SCNC: 3.9 MMOL/L (ref 3.5–5.1)
RBC # BLD AUTO: 3.93 X10(6)UL (ref 3.8–5.3)
SODIUM SERPL-SCNC: 142 MMOL/L (ref 136–145)
WBC # BLD AUTO: 5.2 X10(3) UL (ref 4–11)

## 2025-04-26 PROCEDURE — 99239 HOSP IP/OBS DSCHRG MGMT >30: CPT | Performed by: INTERNAL MEDICINE

## 2025-04-26 RX ORDER — AZITHROMYCIN 250 MG/1
TABLET, FILM COATED ORAL
Qty: 6 TABLET | Refills: 0 | Status: SHIPPED | OUTPATIENT
Start: 2025-04-26 | End: 2025-05-01

## 2025-04-26 NOTE — DISCHARGE SUMMARY
[facility]  part of [facility]     DISCHARGE SUMMARY     Indiana Boudreaux Patient Status:  Inpatient    1975 MRN [id number]   Location [facility] 4W/SW/SE Attending Murtaza Woody MD   Hosp Day # 2 PCP Hernandez Rousseau MD     DATE OF ADMISSION: 2025  DATE OF DISCHARGE:  25  DISPOSITION: home  CONDITION ON DISCHARGE: good    DISCHARGE DIAGNOSES:    Large bowel obstruction (HCC)      Colon stricture (HCC)      HISTORY OF PRESENT ILLNESS (COPIED FROM ADMISSION H&P)  49-year-old  female who came into the emergency department for evaluation of abdominal cramps and loose bowel movements for the last 3 to 4 days, currently menstruating. CBC, chemistry, and liver function tests were unremarkable. CT scan of the abdomen showed moderate stool-filled distention of the colon with an abrupt transition in the mid sigmoid colon suggestive of colonic stricture with concentric mural thickening of the descending colon and minimal surrounding inflammatory changes, findings suggestive of stricture and colonic obstruction. Patient will be admitted to the hospital for further management.     HOSPITAL COURSE:    Sigmoid colon stricture with colonic obstruction   This could be related to prior radiation treatment versus endometriosis  NPO  Cont IVF  Pain control  Gen surgery consult  Plan for gastrografin enema abdominal x-ray today.      FULL CODE  DVT px: SCDs    Patient understands return to the emergency room for increased pain, fever, discharge, shortness of breath, chest pain, new neurologic symptoms, other concerning symptoms.    PHYSICAL EXAM:  Temp:  [96.7 °F (35.9 °C)-98.7 °F (37.1 °C)] 96.7 °F (35.9 °C)  Pulse:  [65-76] 76  Resp:  [16-20] 20  BP: (101-109)/(75-82) 101/75  SpO2:  [97 %-100 %] 98 %  Gen: A+Ox3.  No distress.   HEENT: NCAT, neck supple, no carotid bruit.  CV: RRR, S1S2, and intact distal pulses. No gallop, rub, murmur.  Pulm: Effort and breath sounds normal. No  distress, wheezes, rales, rhonchi.  Abd: Soft, NTND, BS normal, no mass, no HSM, no rebound/guarding.   Neuro: Normal reflexes, CN. Sensory/motor exams grossly normal deficit. Coordination  and gait normal.   MS: No joint effusions.  No peripheral edema.  Skin: Skin is warm and dry. No rashes, erythema, diaphoresis.   Psych: Normal mood and affect. Behavior and judgment normal.     DISCHARGE MEDICATIONS     Discharge Medications        START taking these medications        Instructions Prescription details   azithromycin 250 MG Tabs  Commonly known as: Zithromax Z-Ramakrishna  Start taking on: April 26, 2025      Take 2 tablets (500 mg total) by mouth daily for 1 day, THEN 1 tablet (250 mg total) daily for 4 days.   Stop taking on: May 1, 2025  Quantity: 6 tablet  Refills: 0            CHANGE how you take these medications        Instructions Prescription details   topiramate 25 MG Tabs  Commonly known as: TopaMAX  What changed:   how much to take  when to take this      TAKE 1 TABLET(25 MG) BY MOUTH TWICE DAILY   Quantity: 180 tablet  Refills: 0            CONTINUE taking these medications        Instructions Prescription details   acetaminophen 325 MG Tabs  Commonly known as: Tylenol      Take 1 tablet (325 mg total) by mouth every 6 (six) hours as needed for Pain.   Refills: 0     ALPRAZolam 1 MG Tabs  Commonly known as: Xanax      Take 1 tablet (1 mg total) by mouth 3 (three) times daily as needed for Anxiety.   Refills: 0     buPROPion  MG Tb24  Commonly known as: Wellbutrin XL      Take 1 tablet (150 mg total) by mouth in the morning.   Refills: 0     escitalopram 10 MG Tabs  Commonly known as: Lexapro      Take 1 tablet (10 mg total) by mouth in the morning.   Refills: 0     ibuprofen 200 MG Tabs  Commonly known as: Motrin      Take 1 tablet (200 mg total) by mouth every 6 (six) hours as needed for Pain.   Refills: 0     levocetirizine 5 MG Tabs  Commonly known as: Xyzal      Take 0.5 tablets (2.5 mg total) by  mouth every evening.   Quantity: 90 tablet  Refills: 2     lisdexamfetamine 20 MG Caps  Commonly known as: Vyvanse      Take 1 capsule (20 mg total) by mouth every morning.   Refills: 0     Omeprazole 40 MG Cpdr      Take 1 capsule (40 mg total) by mouth daily.   Quantity: 90 capsule  Refills: 3     Polyethylene Glycol 3350 4 g Pack      Take 1 each by mouth daily.   Quantity: 72 each  Refills: 11     Qnasl 80 MCG/ACT Aers  Generic drug: Beclomethasone Dipropionate      2 sprays by Nasal route daily.   Quantity: 31.8 g  Refills: 2     SEMAGLUTIDE(0.25 OR 0.5MG/DOS) SC      Inject 0.25 mg into the skin once a week.   Refills: 0               Where to Get Your Medications        Please  your prescriptions at the location directed by your doctor or nurse    Bring a paper prescription for each of these medications  azithromycin 250 MG Tabs         CONSULTANTS  Consultants         Provider   Role Specialty     Myranda Jacobson MD      Consulting Physician SURGERY, GENERAL     Gamal Randle      Consulting Physician SURGERY, GENERAL            FOLLOW UP:  Hernandez Rousseau MD  [address] [place] [address]  [phone]    Schedule an appointment as soon as possible for a visit in 1 week(s)      The above plan and follow-up instructions were reviewed with the patient and they verbalized understanding and agreement.  They understand to return to the emergency room for any concerning signs or symptoms.  Greater than 30 minutes spent on discharge.  -----------------------      Lace+ Score: 50  59-90 High Risk  29-58 Medium Risk  0-28   Low Risk.    TCM Follow-Up Recommendation:  LACE 29-58: Moderate Risk of readmission after discharge from the hospital.  Supplementary Documentation:     Murtaza Woody MD  4/26/2025

## 2025-04-26 NOTE — DISCHARGE INSTRUCTIONS
Follow-up with colorectal surgeon at [facility].     soft diet as tolerated. avoid red meat, seeds, nuts for 5 days     she is okay for a regular diet. Just inform her to do multiple small meals throughout the day

## 2025-04-26 NOTE — PROGRESS NOTES
[facility]  Progress Note    Indiana Boudreaux Patient Status:  Inpatient    1975 MRN [id number]   Location [facility] 4W/SW/SE Attending Murtaza Woody MD   Hosp Day # 2 PCP Hernandez Rousseau MD     Subjective:  Patient is resting comfortably in bed this morning.  Patient denies any abdominal discomfort.  Reports passed flatus and bowel movement yesterday.  Tolerating diet, denies nausea or vomiting.  Patient reports that she feels comfortable going home today, denies any complaints.    Objective/Physical Exam:  General: Alert, orientated x3.  Cooperative.  No apparent distress.  Vital Signs:  Blood pressure 101/75, pulse 76, temperature 96.7 °F (35.9 °C), temperature source Oral, resp. rate 20, height 5' 8\" (1.727 m), weight 197 lb 1.6 oz (89.4 kg), last menstrual period 2025, SpO2 98%, not currently breastfeeding.  Wt Readings from Last 3 Encounters:   25 197 lb 1.6 oz (89.4 kg)   25 195 lb 9.6 oz (88.7 kg)   24 205 lb (93 kg)     Lungs: No respiratory distress.  Cardiac: Regular rate and rhythm.   Abdomen:  Soft, non distended, non tender, with no rebound or guarding.  No peritoneal signs.   Extremities:  No lower extremity edema noted.      Intake/Output:    Intake/Output Summary (Last 24 hours) at 2025 1026  Last data filed at 2025 0900  Gross per 24 hour   Intake 2188 ml   Output 450 ml   Net 1738 ml     I/O last 3 completed shifts:  In: 2691.7 [P.O.:200; I.V.:2491.7]  Out: 451 [Urine:450; Stool:1]  I/O this shift:  In: 120 [P.O.:120]  Out: -     Medications: Scheduled Medications[1]    Labs:  Lab Results   Component Value Date    WBC 5.2 2025    HGB 11.4 2025    HCT 35.6 2025    .0 2025     Lab Results   Component Value Date     2025    K 3.9 2025     2025    CO2 27.0 2025    BUN 7 2025    CREATSERUM 0.76 2025    GLU 98 2025    CA 8.3 2025     No results  found for: \"PT\", \"INR\"      Assessment  Problem List[2]    Large bowel obstruction secondary to history of colonic stricture, resolved  Endometriosis     Plan:  Patient is stable for discharge home from surgical standpoint as large bowel obstruction has resolved on imaging and patient's symptoms have resolved.  Follow-up with colorectal surgeon at [facility].  Analgesics and antiemetics as needed  Ambulate and up to chair  GI prophylaxis with Protonix    Quality:  DVT Mechanical Prophylaxis:   SCDs,    DVT Pharmacologic Prophylaxis   Medication   None                Code Status: Full Code  Cyr: No urinary catheter in place  Cyr Duration (in days):   Central line:    YAQUELIN: 4/26/2025        Melody Boo PA-C  4/26/2025  10:26 AM               [1]    polyethylene glycol (PEG 3350)  17 g Oral Daily    buPROPion ER  150 mg Oral Daily    escitalopram  10 mg Oral Daily    pantoprazole  40 mg Oral QAM AC    fluticasone propionate  2 spray Each Nare Daily    cetirizine  5 mg Oral Nightly    topiramate  50 mg Oral Nightly   [2]   Patient Active Problem List  Diagnosis    Abdominal pain, unspecified location    Seasonal allergic rhinitis due to other allergic trigger, unspecified chronicity    Chronic sinusitis, unspecified location    History of non-Hodgkin's lymphoma    Colon stricture (HCC)    Endometriosis    Depression    Gastroesophageal reflux disease without esophagitis    Anxiety    Cholelithiasis    LLQ pain    Migraine without aura    Non-Hodgkin's lymphoma (HCC)    Pulmonary infiltrate in right lung on chest x-ray    Vitamin D deficiency    Weight gain    Hx of migraines    Encounter for therapeutic drug monitoring    Hypercholesterolemia    GERD (gastroesophageal reflux disease)    Vertigo    Hx of obesity    Patient overweight    Obesity (BMI 30-39.9)    Family history of malignant neoplasm of breast    Large bowel obstruction (HCC)

## 2025-04-26 NOTE — PLAN OF CARE
Alert and oriented x 4. Family at the bedside. CMS intact. SCDs discussed and in place, tolerated. Room air. LBM 4/25/25. Tolerated diet. No nausea/vomiting noted. Menstruating. Voiding well without complication with adequate amount of urine output. IVF. Skin intact. Denies pain. Calls appropriately. Bed locked in the lowest position with the call light and belongings within reach. Up out of bed independently. Rested well overnight. Plan is home with  pending medical clearance.   Problem: Patient Centered Care  Goal: Patient preferences are identified and integrated in the patient's plan of care  Description: Interventions:- What would you like us to know as we care for you? I have two boys who are twins.  Provide timely, complete, and accurate information to patient/family- Incorporate patient and family knowledge, values, beliefs, and cultural backgrounds into the planning and delivery of care- Encourage patient/family to participate in care and decision-making at the level they choose- Honor patient and family perspectives and choices  Outcome: Progressing     Problem: Patient/Family Goals  Goal: Patient/Family Long Term Goal  Description: Patient's Long Term Goal: to return home Interventions:- Vital signs stable, labs within normal limits, tolerates diet, bowel movement.  See additional Care Plan goals for specific interventions  Outcome: Progressing  Goal: Patient/Family Short Term Goal  Description: Patient's Short Term Goal: able to eat food. Interventions: - advance diet slowly, able to tolerate diet without nausea/vomiting/abdominal pain- See additional Care Plan goals for specific interventions  Outcome: Progressing     Problem: PAIN - ADULT  Goal: Verbalizes/displays adequate comfort level or patient's stated pain goal  Description: INTERVENTIONS:- Encourage pt to monitor pain and request assistance- Assess pain using appropriate pain scale- Administer analgesics based on type and severity of pain  and evaluate response- Implement non-pharmacological measures as appropriate and evaluate response- Consider cultural and social influences on pain and pain management- Manage/alleviate anxiety- Utilize distraction and/or relaxation techniques- Monitor for opioid side effects- Notify MD/LIP if interventions unsuccessful or patient reports new pain- Anticipate increased pain with activity and pre-medicate as appropriate  Outcome: Progressing     Problem: RISK FOR INFECTION - ADULT  Goal: Absence of fever/infection during anticipated neutropenic period  Description: INTERVENTIONS- Monitor WBC- Administer growth factors as ordered- Implement neutropenic guidelines  Outcome: Progressing     Problem: SAFETY ADULT - FALL  Goal: Free from fall injury  Description: INTERVENTIONS:- Assess pt frequently for physical needs- Identify cognitive and physical deficits and behaviors that affect risk of falls.- [place] fall precautions as indicated by assessment.- Educate pt/family on patient safety including physical limitations- Instruct pt to call for assistance with activity based on assessment- Modify environment to reduce risk of injury- Provide assistive devices as appropriate- Consider OT/PT consult to assist with strengthening/mobility- Encourage toileting schedule  Outcome: Progressing     Problem: DISCHARGE PLANNING  Goal: Discharge to home or other facility with appropriate resources  Description: INTERVENTIONS:- Identify barriers to discharge w/pt and caregiver- Include patient/family/discharge partner in discharge planning- Arrange for needed discharge resources and transportation as appropriate- Identify discharge learning needs (meds, wound care, etc)- Arrange for interpreters to assist at discharge as needed- Consider post-discharge preferences of patient/family/discharge partner- Complete POLST form as appropriate- Assess patient's ability to be responsible for managing their own health- Refer to Case Management  Department for coordinating discharge planning if the patient needs post-hospital services based on physician/LIP order or complex needs related to functional status, cognitive ability or social support system  Outcome: Progressing     Problem: GASTROINTESTINAL - ADULT  Goal: Maintains or returns to baseline bowel function  Description: INTERVENTIONS:- Assess bowel function- Maintain adequate hydration with IV or PO as ordered and tolerated- Evaluate effectiveness of GI medications- Encourage mobilization and activity- Obtain nutritional consult as needed- Establish a toileting routine/schedule- Consider collaborating with pharmacy to review patient's medication profile  Outcome: Progressing     Problem: METABOLIC/FLUID AND ELECTROLYTES - ADULT  Goal: Electrolytes maintained within normal limits  Description: INTERVENTIONS:- Monitor labs and rhythm and assess patient for signs and symptoms of electrolyte imbalances- Administer electrolyte replacement as ordered- Monitor response to electrolyte replacements, including rhythm and repeat lab results as appropriate- Fluid restriction as ordered- Instruct patient on fluid and nutrition restrictions as appropriate  Outcome: Progressing     Problem: SKIN/TISSUE INTEGRITY - ADULT  Goal: Skin integrity remains intact  Description: INTERVENTIONS- Assess and document risk factors for pressure ulcer development- Assess and document skin integrity- Monitor for areas of redness and/or skin breakdown- Initiate interventions, skin care algorithm/standards of care as needed  Outcome: Progressing

## 2025-04-28 ENCOUNTER — PATIENT OUTREACH (OUTPATIENT)
Dept: CASE MANAGEMENT | Age: 50
End: 2025-04-28

## 2025-04-28 NOTE — PROGRESS NOTES
TCM assist.    Hernandez Rousseau M.D.  Internal Medicine  [address] [id number] [phone]    Patient does not wish to follow up.    Confirmed with patient.    Closing encounter.

## 2025-04-28 NOTE — PAYOR COMM NOTE
--------------  DISCHARGE REVIEW    Payor: [facility]  Subscriber #:  [id number]  Authorization Number: [id number]    Admit date: 25  Admit time:   2:24 PM  Discharge Date: 2025  2:47 PM     Admitting Physician: Richie Reid MD  Attending Physician:  No att. providers found  Primary Care Physician: Hernandez Rousseau MD  Discharge Plan     Discharged: 2025 1447   Discharging provider: Murtaza Woody MD   Discharge disposition: Home or Self Care             Discharge Summary Notes        Discharge Summary signed by Murtaza Woody MD at 2025  1:50 PM       Author: Murtaza Woody MD Specialty: Internal Medicine, HOSPITALIST Author Type: Physician    Filed: 2025  1:50 PM Date of Service: 2025 11:25 AM Status: Signed    : Murtaza Woody MD (Physician)           [facility]  part of [facility]     DISCHARGE SUMMARY     Indiana Boudreaux Patient Status:  Inpatient    1975 MRN [id number]   Location [facility] 4W// Attending Murtaza Woody MD    Day # 2 PCP Hernandez Rousseau MD     DATE OF ADMISSION: 2025  DATE OF DISCHARGE:  25  DISPOSITION: home  CONDITION ON DISCHARGE: good    DISCHARGE DIAGNOSES:    Large bowel obstruction (HCC)      Colon stricture (HCC)      HISTORY OF PRESENT ILLNESS (COPIED FROM ADMISSION H&P)  49-year-old  female who came into the emergency department for evaluation of abdominal cramps and loose bowel movements for the last 3 to 4 days, currently menstruating. CBC, chemistry, and liver function tests were unremarkable. CT scan of the abdomen showed moderate stool-filled distention of the colon with an abrupt transition in the mid sigmoid colon suggestive of colonic stricture with concentric mural thickening of the descending colon and minimal surrounding inflammatory changes, findings suggestive of stricture and colonic obstruction. Patient will be admitted to the hospital for further  management.     HOSPITAL COURSE:    Sigmoid colon stricture with colonic obstruction   This could be related to prior radiation treatment versus endometriosis  NPO  Cont IVF  Pain control  Gen surgery consult  Plan for gastrografin enema abdominal x-ray today.      FULL CODE  DVT px: SCDs    Patient understands return to the emergency room for increased pain, fever, discharge, shortness of breath, chest pain, new neurologic symptoms, other concerning symptoms.    PHYSICAL EXAM:  Temp:  [96.7 °F (35.9 °C)-98.7 °F (37.1 °C)] 96.7 °F (35.9 °C)  Pulse:  [65-76] 76  Resp:  [16-20] 20  BP: (101-109)/(75-82) 101/75  SpO2:  [97 %-100 %] 98 %  Gen: A+Ox3.  No distress.   HEENT: NCAT, neck supple, no carotid bruit.  CV: RRR, S1S2, and intact distal pulses. No gallop, rub, murmur.  Pulm: Effort and breath sounds normal. No distress, wheezes, rales, rhonchi.  Abd: Soft, NTND, BS normal, no mass, no HSM, no rebound/guarding.   Neuro: Normal reflexes, CN. Sensory/motor exams grossly normal deficit. Coordination  and gait normal.   MS: No joint effusions.  No peripheral edema.  Skin: Skin is warm and dry. No rashes, erythema, diaphoresis.   Psych: Normal mood and affect. Behavior and judgment normal.     DISCHARGE MEDICATIONS     Discharge Medications        START taking these medications        Instructions Prescription details   azithromycin 250 MG Tabs  Commonly known as: Zithromax Z-Ramakrishna  Start taking on: April 26, 2025      Take 2 tablets (500 mg total) by mouth daily for 1 day, THEN 1 tablet (250 mg total) daily for 4 days.   Stop taking on: May 1, 2025  Quantity: 6 tablet  Refills: 0            CHANGE how you take these medications        Instructions Prescription details   topiramate 25 MG Tabs  Commonly known as: TopaMAX  What changed:   how much to take  when to take this      TAKE 1 TABLET(25 MG) BY MOUTH TWICE DAILY   Quantity: 180 tablet  Refills: 0            CONTINUE taking these medications        Instructions  Prescription details   acetaminophen 325 MG Tabs  Commonly known as: Tylenol      Take 1 tablet (325 mg total) by mouth every 6 (six) hours as needed for Pain.   Refills: 0     ALPRAZolam 1 MG Tabs  Commonly known as: Xanax      Take 1 tablet (1 mg total) by mouth 3 (three) times daily as needed for Anxiety.   Refills: 0     buPROPion  MG Tb24  Commonly known as: Wellbutrin XL      Take 1 tablet (150 mg total) by mouth in the morning.   Refills: 0     escitalopram 10 MG Tabs  Commonly known as: Lexapro      Take 1 tablet (10 mg total) by mouth in the morning.   Refills: 0     ibuprofen 200 MG Tabs  Commonly known as: Motrin      Take 1 tablet (200 mg total) by mouth every 6 (six) hours as needed for Pain.   Refills: 0     levocetirizine 5 MG Tabs  Commonly known as: Xyzal      Take 0.5 tablets (2.5 mg total) by mouth every evening.   Quantity: 90 tablet  Refills: 2     lisdexamfetamine 20 MG Caps  Commonly known as: Vyvanse      Take 1 capsule (20 mg total) by mouth every morning.   Refills: 0     Omeprazole 40 MG Cpdr      Take 1 capsule (40 mg total) by mouth daily.   Quantity: 90 capsule  Refills: 3     Polyethylene Glycol 3350 4 g Pack      Take 1 each by mouth daily.   Quantity: 72 each  Refills: 11     Qnasl 80 MCG/ACT Aers  Generic drug: Beclomethasone Dipropionate      2 sprays by Nasal route daily.   Quantity: 31.8 g  Refills: 2     SEMAGLUTIDE(0.25 OR 0.5MG/DOS) SC      Inject 0.25 mg into the skin once a week.   Refills: 0               Where to Get Your Medications        Please  your prescriptions at the location directed by your doctor or nurse    Bring a paper prescription for each of these medications  azithromycin 250 MG Tabs         CONSULTANTS  Consultants         Provider   Role Specialty     Myranda Jacobson MD      Consulting Physician SURGERY, GENERAL     Gamal Randle      Consulting Physician SURGERY, GENERAL            FOLLOW UP:  Hernandez Rousseau MD  [address] [place] [address]  [id number]  [phone]    Schedule an appointment as soon as possible for a visit in 1 week(s)      The above plan and follow-up instructions were reviewed with the patient and they verbalized understanding and agreement.  They understand to return to the emergency room for any concerning signs or symptoms.  Greater than 30 minutes spent on discharge.  -----------------------      Lace+ Score: 50  59-90 High Risk  29-58 Medium Risk  0-28   Low Risk.    TCM Follow-Up Recommendation:  LACE 29-58: Moderate Risk of readmission after discharge from the hospital.  Supplementary Documentation:     Murtaza Woody MD  4/26/2025      Electronically signed by Murtaza Woody MD on 4/27/2025  1:50 PM         REVIEWER COMMENTS

## 2025-04-29 RX ORDER — OMEPRAZOLE 40 MG/1
40 CAPSULE, DELAYED RELEASE ORAL DAILY
Qty: 90 CAPSULE | Refills: 0 | OUTPATIENT
Start: 2025-04-29

## 2025-04-29 RX ORDER — OMEPRAZOLE 40 MG/1
40 CAPSULE, DELAYED RELEASE ORAL DAILY
Qty: 30 CAPSULE | Refills: 0 | Status: SHIPPED | OUTPATIENT
Start: 2025-04-29

## 2025-05-28 ENCOUNTER — TELEPHONE (OUTPATIENT)
Dept: ALLERGY | Facility: CLINIC | Age: 50
End: 2025-05-28

## 2025-05-28 NOTE — TELEPHONE ENCOUNTER
Video visit scheduled for June 11, 2025.     Pt inquiring if we know which mail order pharmacy her Qnasl comes from? Rn checked, Qnasl has only been sent to physical [facility]. She does have [facility] mail order on her chart as well.     RN sent Rx for Qnasl to [facility]. Pt to send us a message if they are unable to fill there, and we can send the prescription somewhere else.   Pt agreeable to plan of care.

## 2025-05-28 NOTE — TELEPHONE ENCOUNTER
Patient called and is asking if she can get a video follow up appointment instead of in person please advise.

## 2025-06-11 ENCOUNTER — TELEMEDICINE (OUTPATIENT)
Dept: ALLERGY | Facility: CLINIC | Age: 50
End: 2025-06-11
Payer: COMMERCIAL

## 2025-06-11 DIAGNOSIS — J30.2 SEASONAL AND PERENNIAL ALLERGIC RHINOCONJUNCTIVITIS: ICD-10-CM

## 2025-06-11 DIAGNOSIS — H10.10 SEASONAL AND PERENNIAL ALLERGIC RHINOCONJUNCTIVITIS: ICD-10-CM

## 2025-06-11 DIAGNOSIS — Z23 FLU VACCINE NEED: ICD-10-CM

## 2025-06-11 DIAGNOSIS — Z23 NEED FOR COVID-19 VACCINE: ICD-10-CM

## 2025-06-11 DIAGNOSIS — J30.89 SEASONAL AND PERENNIAL ALLERGIC RHINOCONJUNCTIVITIS: ICD-10-CM

## 2025-06-11 DIAGNOSIS — J32.8 OTHER CHRONIC SINUSITIS: Primary | ICD-10-CM

## 2025-06-11 RX ORDER — BECLOMETHASONE DIPROPIONATE 80 UG/1
2 AEROSOL, METERED NASAL DAILY
Qty: 31.8 G | Refills: 2 | Status: SHIPPED | OUTPATIENT
Start: 2025-06-11

## 2025-06-11 RX ORDER — MONTELUKAST SODIUM 10 MG/1
10 TABLET ORAL NIGHTLY
Qty: 30 TABLET | Refills: 0 | Status: SHIPPED | OUTPATIENT
Start: 2025-06-11

## 2025-06-11 RX ORDER — MONTELUKAST SODIUM 10 MG/1
10 TABLET ORAL NIGHTLY
Qty: 90 TABLET | Refills: 0 | OUTPATIENT
Start: 2025-06-11

## 2025-06-11 RX ORDER — METHYLPREDNISOLONE 4 MG/1
TABLET ORAL
Qty: 21 TABLET | Refills: 0 | Status: SHIPPED | OUTPATIENT
Start: 2025-06-11

## 2025-06-11 NOTE — TELEPHONE ENCOUNTER
Refill requested for:  Name from pharmacy: MONTELUKAST 10MG TABLETS          Will file in chart as: MONTELUKAST 10 MG Oral Tab     Possible duplicate: Marilynn to review recent actions on this medication    Sig: TAKE 1 TABLET(10 MG) BY MOUTH EVERY NIGHT    Disp: 90 tablet    Refills: 0 (Pharmacy requested: Not specified)    Start: 6/11/2025    Class: Normal    To pharmacy: **Patient requests 90 days supply**    Last ordered: Today (6/11/2025) by Dima Brand MD    Last refill: 6/11/2025    Rx #: [id number]    Corticosteroids / Long-Acting Bronchodilators Passed 06/11/2025 12:15 PM   Protocol Details Appt in past 6 mos or next 3 mos    Medication is active on med list      [id number] [address] AT [facility], [phone], [phone]       Last office visit: Today 6/11/2025    Previously advised to follow up in: not listed in last office visit note    F/U currently scheduled?   No      Date of last refill: Last refilled today for a 30 day supply trial.    ACTION: Refill denied since Dr. Brand only sent a 30 day supply as a trial today.

## 2025-06-11 NOTE — PROGRESS NOTES
Indiana Boudreaux is a 49 year old female.    HPI:   No chief complaint on file.    Patient is a 49-year-old female who presents for follow-up via video visit    This visit is conducted using Telemedicine with live, interactive video and audio during this Coronavirus pandemic.     Please note that the following visit was completed using two-way, real-time interactive audio and/or video communication.  This has been done in good blessing to provide continuity of care in the best interest of the provider-patient relationship, due to the ongoing public health crisis/national emergency and because of restrictions of visitation.  There are limitations of this visit as no physical exam could be performed.  Every conscious effort was taken to allow for sufficient and adequate time.  This billing was spent on reviewing labs, medications, radiology tests and decision making.  Appropriate medical decision-making and tests are ordered as detailed in the plan of care below     Patient last seen by me in April 2024  Patient with history of chronic sinusitis and allergic rhinitis  Prior skin testing was positive for dust mites  Medication list include Qnasl and Xyzal    Immunizations reviewed    Today patient reports  Allergic rhinitis  Recent flare in May.  Better now.  Symptoms included sinus congestion and ear congestion.  No fevers or mucopurulence.  Was treated with 2 separate antibiotics by her PCP.  Currently using Xyzal and Qnasl  No current fevers or mucopurulence.    Chronic sinusitis.  No current fevers or mucopurulence.  Denies recurrent courses of steroids or antibiotics over the past year.    History of Present Illness           HISTORY:  Past Medical History[1]   Past Surgical History[2]   Family History[3]   Social History: Short Social Hx on File[4]     Medications (Active prior to today's visit):  Current Medications[5]    Allergies:  Allergies[6]      ROS:   Allergic/Immuno:  See hpi  Cardiovascular:  Negative for  irregular heartbeat/palpitations, chest pain, edema  Constitutional:  Negative night sweats,weight loss, irritability and lethargy  ENMT:  Negative for ear drainage, hearing loss and nasal drainage  Eyes:  Negative for eye discharge and vision loss  Gastrointestinal:  Negative for abdominal pain, diarrhea and vomiting  Integumentary:  Negative for pruritus and rash  Respiratory:  Negative for cough, dyspnea and wheezing    PHYSICAL EXAM:   Constitutional: responsive, no acute distress noted  Head/Face: NC/Atraumatic  Eyes/Vision: conjunctiva and lids are normal extraocular motion is intact   Speaking in full sentences no increased work of breathing  A&O x 3     ASSESSMENT/PLAN:   Assessment   Encounter Diagnoses   Name Primary?    Other chronic sinusitis Yes    Seasonal and perennial allergic rhinoconjunctivitis     Flu vaccine need     Need for COVID-19 vaccine        Assessment & Plan    #1 allergic rhinitis  Continue with Xyzal and Qnasl.  May add Singulair/montelukast 10 mg once a day if having increasing symptoms.  Reviewed FDA warning with Singulair.  Singular prescription x 1 month granted.  Also sent for Medrol Dosepak in event of worsening symptoms in spite of above recommendations.  May consider nasal saline sinus rinses    2.  Chronic sinusitis.  Continue with treatment of underlying allergies.  Reviewed avoidance measures and potential treatment option of therapy      Flu vaccine in the fall recommended  To consider COVID-vaccine booster in the fall         Orders This Visit:  No orders of the defined types were placed in this encounter.      Meds This Visit:  Requested Prescriptions      No prescriptions requested or ordered in this encounter       Imaging & Referrals:  None     6/11/2025  Dima Bradn MD    If medication samples were provided today, they were provided solely for patient education and training related to self administration of these medications.  Teaching, instruction and sample  was provided to the patient by myself.  Teaching included  a review of potential adverse side effects as well as potential efficacy.  Patient's questions were answered in regards to medication administration and dosing and potential side effects. Teaching was provided via the teach back method           [1]   Past Medical History:   Anxiety state, unspecified    Chronic maxillary sinusitis    Deviated nasal septum    Endometriosis    Ethmoid sinusitis    Exposure to radiation    GERD (gastroesophageal reflux disease)    H/O dilation and curettage    Hypertrophy of nasal turbinates    Non Hodgkin's lymphoma (HCC)    treated with localized RT    Seasonal allergies   [2]   Past Surgical History:  Procedure Laterality Date    Excision turbinate,submucous      Nasal scopy,remv part ethmoid      Nasal scopy,rmv tiss maxill sinus      Other Left 2005    excision groin lymph node    Other  2010    ectopic pregnancy     Reduction left  2000    Reduction left  2002    Reduction right  2000    Reduction right  2002    Repair of nasal septum     [3]   Family History  Problem Relation Age of Onset    Cancer Mother 79        bladder; non-smoker    Diabetes Maternal Grandmother     Heart Disorder Maternal Grandfather     Breast Cancer Paternal Grandmother         20s, lived til age 88    Hypertension Half-Brother     Cancer Self 29        non hodgkins lymphoma     Cancer Maternal Aunt 83        brain cancer    Cancer Paternal Uncle         unknown type   [4]   Social History  Socioeconomic History    Marital status:    Tobacco Use    Smoking status: Former     Current packs/day: 0.00     Types: Cigarettes     Start date: 1996     Quit date: 2006     Years since quittin.0    Smokeless tobacco: Former    Tobacco comments:     No household smokers.    Vaping Use    Vaping status: Never Used   Substance and Sexual Activity    Alcohol use: Yes     Alcohol/week: 4.0 standard drinks of alcohol     Types: 4 Glasses  of wine per week     Comment: Social     Drug use: No    Sexual activity: Yes     Partners: Male     Birth control/protection: I.U.D.   Other Topics Concern    Pt has a pacemaker No    Pt has a defibrillator No    Reaction to local anesthetic No     Social Drivers of Health     Food Insecurity: No Food Insecurity (4/24/2025)    NCSS - Food Insecurity     Worried About Running Out of Food in the Last Year: No     Ran Out of Food in the Last Year: No   Transportation Needs: No Transportation Needs (4/24/2025)    NCSS - Transportation     Lack of Transportation: No   Housing Stability: Not At Risk (4/24/2025)    NCSS - Housing/Utilities     Has Housing: Yes     Worried About Losing Housing: No     Unable to Get Utilities: No   [5]   Current Outpatient Medications   Medication Sig Dispense Refill    Beclomethasone Dipropionate (QNASL) 80 MCG/ACT Nasal Aero Soln 2 sprays by Nasal route daily. 31.8 g 0    Omeprazole 40 MG Oral Capsule Delayed Release TAKE 1 CAPSULE(40 MG) BY MOUTH DAILY 30 capsule 0    ALPRAZolam 1 MG Oral Tab Take 1 tablet (1 mg total) by mouth 3 (three) times daily as needed for Anxiety.      lisdexamfetamine 20 MG Oral Cap Take 1 capsule (20 mg total) by mouth every morning.      acetaminophen 325 MG Oral Tab Take 1 tablet (325 mg total) by mouth every 6 (six) hours as needed for Pain.      ibuprofen 200 MG Oral Tab Take 1 tablet (200 mg total) by mouth every 6 (six) hours as needed for Pain.      SEMAGLUTIDE,0.25 OR 0.5MG/DOS, SC Inject 0.25 mg into the skin once a week.      levocetirizine 5 MG Oral Tab Take 0.5 tablets (2.5 mg total) by mouth every evening. 90 tablet 2    Polyethylene Glycol 3350 4 g Oral Powd Pack Take 1 each by mouth daily. 72 each 11    TOPIRAMATE 25 MG Oral Tab TAKE 1 TABLET(25 MG) BY MOUTH TWICE DAILY (Patient taking differently: Take 2 tablets (50 mg total) by mouth at bedtime.) 180 tablet 0    escitalopram 10 MG Oral Tab Take 1 tablet (10 mg total) by mouth in the morning.       buPROPion 150 MG Oral Tablet 24 Hr Take 1 tablet (150 mg total) by mouth in the morning.     [6]   Allergies  Allergen Reactions    Sulfa Antibiotics HIVES

## 2025-07-10 ENCOUNTER — LAB ENCOUNTER (OUTPATIENT)
Dept: LAB | Facility: HOSPITAL | Age: 50
End: 2025-07-10
Attending: NURSE PRACTITIONER
Payer: COMMERCIAL

## 2025-07-10 DIAGNOSIS — E66.01 MORBID OBESITY (HCC): Primary | ICD-10-CM

## 2025-07-10 DIAGNOSIS — R53.82 CHRONIC FATIGUE: ICD-10-CM

## 2025-07-10 LAB
ALBUMIN SERPL-MCNC: 4.2 G/DL (ref 3.2–4.8)
ALBUMIN/GLOB SERPL: 2.1 {RATIO} (ref 1–2)
ALP LIVER SERPL-CCNC: 83 U/L (ref 39–100)
ALT SERPL-CCNC: 14 U/L (ref 10–49)
ANION GAP SERPL CALC-SCNC: 9 MMOL/L (ref 0–18)
AST SERPL-CCNC: 19 U/L (ref ?–34)
BASOPHILS # BLD AUTO: 0.03 X10(3) UL (ref 0–0.2)
BASOPHILS NFR BLD AUTO: 0.5 %
BILIRUB SERPL-MCNC: 0.2 MG/DL (ref 0.3–1.2)
BUN BLD-MCNC: 15 MG/DL (ref 9–23)
BUN/CREAT SERPL: 16.9 (ref 10–20)
CALCIUM BLD-MCNC: 8.7 MG/DL (ref 8.7–10.4)
CHLORIDE SERPL-SCNC: 109 MMOL/L (ref 98–112)
CO2 SERPL-SCNC: 24 MMOL/L (ref 21–32)
CREAT BLD-MCNC: 0.89 MG/DL (ref 0.55–1.02)
DEPRECATED HBV CORE AB SER IA-ACNC: 74 NG/ML (ref 50–306)
DEPRECATED RDW RBC AUTO: 47.8 FL (ref 35.1–46.3)
EGFRCR SERPLBLD CKD-EPI 2021: 80 ML/MIN/1.73M2 (ref 60–?)
EOSINOPHIL # BLD AUTO: 0.12 X10(3) UL (ref 0–0.7)
EOSINOPHIL NFR BLD AUTO: 2.2 %
ERYTHROCYTE [DISTWIDTH] IN BLOOD BY AUTOMATED COUNT: 13.2 % (ref 11–15)
FASTING STATUS PATIENT QL REPORTED: YES
FOLATE SERPL-MCNC: 7.8 NG/ML (ref 5.4–?)
GLOBULIN PLAS-MCNC: 2 G/DL (ref 2–3.5)
GLUCOSE BLD-MCNC: 86 MG/DL (ref 70–99)
HCT VFR BLD AUTO: 40.3 % (ref 35–48)
HGB BLD-MCNC: 12.2 G/DL (ref 12–16)
IMM GRANULOCYTES # BLD AUTO: 0.01 X10(3) UL (ref 0–1)
IMM GRANULOCYTES NFR BLD: 0.2 %
IRON SATN MFR SERPL: 13 % (ref 15–50)
IRON SERPL-MCNC: 31 UG/DL (ref 50–170)
LYMPHOCYTES # BLD AUTO: 1.66 X10(3) UL (ref 1–4)
LYMPHOCYTES NFR BLD AUTO: 29.9 %
MCH RBC QN AUTO: 29.5 PG (ref 26–34)
MCHC RBC AUTO-ENTMCNC: 30.3 G/DL (ref 31–37)
MCV RBC AUTO: 97.6 FL (ref 80–100)
MONOCYTES # BLD AUTO: 0.42 X10(3) UL (ref 0.1–1)
MONOCYTES NFR BLD AUTO: 7.6 %
NEUTROPHILS # BLD AUTO: 3.32 X10 (3) UL (ref 1.5–7.7)
NEUTROPHILS # BLD AUTO: 3.32 X10(3) UL (ref 1.5–7.7)
NEUTROPHILS NFR BLD AUTO: 59.6 %
OSMOLALITY SERPL CALC.SUM OF ELEC: 294 MOSM/KG (ref 275–295)
PLATELET # BLD AUTO: 176 10(3)UL (ref 150–450)
POTASSIUM SERPL-SCNC: 4.2 MMOL/L (ref 3.5–5.1)
PROT SERPL-MCNC: 6.2 G/DL (ref 5.7–8.2)
RBC # BLD AUTO: 4.13 X10(6)UL (ref 3.8–5.3)
SODIUM SERPL-SCNC: 142 MMOL/L (ref 136–145)
TOTAL IRON BINDING CAPACITY: 240 UG/DL (ref 250–425)
TRANSFERRIN SERPL-MCNC: 174 MG/DL (ref 250–380)
TSI SER-ACNC: 2.15 UIU/ML (ref 0.55–4.78)
VIT B12 SERPL-MCNC: 278 PG/ML (ref 211–911)
VIT D+METAB SERPL-MCNC: 42.8 NG/ML (ref 30–100)
WBC # BLD AUTO: 5.6 X10(3) UL (ref 4–11)

## 2025-07-10 PROCEDURE — 82306 VITAMIN D 25 HYDROXY: CPT

## 2025-07-10 PROCEDURE — 85652 RBC SED RATE AUTOMATED: CPT

## 2025-07-10 PROCEDURE — 82607 VITAMIN B-12: CPT

## 2025-07-10 PROCEDURE — 85025 COMPLETE CBC W/AUTO DIFF WBC: CPT

## 2025-07-10 PROCEDURE — 80053 COMPREHEN METABOLIC PANEL: CPT

## 2025-07-10 PROCEDURE — 82728 ASSAY OF FERRITIN: CPT

## 2025-07-10 PROCEDURE — 36415 COLL VENOUS BLD VENIPUNCTURE: CPT

## 2025-07-10 PROCEDURE — 82746 ASSAY OF FOLIC ACID SERUM: CPT

## 2025-07-10 PROCEDURE — 83540 ASSAY OF IRON: CPT

## 2025-07-10 PROCEDURE — 84466 ASSAY OF TRANSFERRIN: CPT

## 2025-07-10 PROCEDURE — 84443 ASSAY THYROID STIM HORMONE: CPT

## 2025-07-11 ENCOUNTER — LAB ENCOUNTER (OUTPATIENT)
Dept: LAB | Age: 50
End: 2025-07-11
Payer: COMMERCIAL

## 2025-07-11 DIAGNOSIS — E66.01 MORBID OBESITY (HCC): ICD-10-CM

## 2025-07-11 DIAGNOSIS — R53.82 CHRONIC FATIGUE: ICD-10-CM

## 2025-07-11 LAB — ERYTHROCYTE [SEDIMENTATION RATE] IN BLOOD: 24 MM/HR (ref 0–20)

## 2025-07-11 PROCEDURE — 36415 COLL VENOUS BLD VENIPUNCTURE: CPT

## 2025-07-11 PROCEDURE — 85652 RBC SED RATE AUTOMATED: CPT

## (undated) DEVICE — CANISTER SAFETOUCH SYST DISP

## (undated) DEVICE — COVER SGL STRL LGHT HNDL BLU

## (undated) DEVICE — CURRETTE 8MM CVD

## (undated) DEVICE — TUBING SUCTION COLLECTION SET

## (undated) DEVICE — LUBRICANT JLY SURGILUBE 2OZ

## (undated) DEVICE — D AND C PACK: Brand: MEDLINE INDUSTRIES, INC.

## (undated) DEVICE — STIRRUP STRAP W/SLING RING

## (undated) DEVICE — CANISTER SCT BTL SL CAP BRKLY

## (undated) DEVICE — STERILE LATEX POWDER-FREE SURGICAL GLOVESWITH NITRILE COATING: Brand: PROTEXIS

## (undated) DEVICE — SUCTION CANISTER, 3000CC,SAFELINER: Brand: DEROYAL

## (undated) DEVICE — SOL  .9 1000ML BTL

## (undated) NOTE — LETTER
8/22/2018              [address] [place] [id number]         Dear Carmen Melton,      It was a pleasure to see you at our [address] [facility] office.  You have negative pap and HPV test

## (undated) NOTE — LETTER
Date & Time: 8/1/2021, 11:01 AM  Patient: Sandrine Ewing  Encounter Provider(s):    JUDIE Bradford       To Whom It May Concern:    Margarita Antonio was seen and treated in our department on 8/1/2021.  She should not return to work until [employer]

## (undated) NOTE — ED AVS SNAPSHOT
[facility] AND [facility] in [address] [facility]  [address]    Phone:  [phone]    Fax:  Samantha   MRN: [id number]    Department:  [facility] in [place]   Date of Visit:  5 deductible, co-payment, or co-insurance and for other services not covered under your health insurance plan. Please contact your insurance company and physician's office to determine coverage and benefits available for follow-up care and referrals.      It continue to take your medications as instructed by your Primary Care doctor until you can check with your doctor. Please bring the medication list to your next doctor's appointment.     Any imaging studies and labs completed today can be reviewed in your M can help with your Affordable Care Act coverage, as well as all types of Medicaid plans. To get signed up and covered, please call [phone] and ask to get set up for an insurance coverage that is in-network with [facility]

## (undated) NOTE — LETTER
[facility] [address] [facility] [address]  [phone]     Patient: Isabella Myers   YOB: 1975   Date of Visit: 2/3/2021     Dear Employer,        Return date 02/11/2021  At [facility], we are taking special p Persons infected with SARS-CoV-2 who never develop COVID-19 symptoms may discontinue isolation and other precautions 10 days after the date of their first positive RT-PCR test for SARS-CoV-2 RNA.     Persons who are asymptomatic but have been exposed, CDC r